# Patient Record
Sex: MALE | Race: WHITE | NOT HISPANIC OR LATINO | Employment: OTHER | URBAN - METROPOLITAN AREA
[De-identification: names, ages, dates, MRNs, and addresses within clinical notes are randomized per-mention and may not be internally consistent; named-entity substitution may affect disease eponyms.]

---

## 2018-07-15 ENCOUNTER — HOSPITAL ENCOUNTER (INPATIENT)
Facility: HOSPITAL | Age: 68
LOS: 15 days | Discharge: NURSING, REHAB OR LONG-TERM CARE | DRG: 064 | End: 2018-07-30
Attending: INTERNAL MEDICINE | Admitting: INTERNAL MEDICINE
Payer: MEDICARE

## 2018-07-15 ENCOUNTER — HOSPITAL ENCOUNTER (EMERGENCY)
Facility: HOSPITAL | Age: 68
Discharge: ANOTHER HOSPITAL | End: 2018-07-15
Attending: EMERGENCY MEDICINE

## 2018-07-15 VITALS
HEART RATE: 98 BPM | SYSTOLIC BLOOD PRESSURE: 109 MMHG | TEMPERATURE: 100 F | RESPIRATION RATE: 18 BRPM | WEIGHT: 200 LBS | DIASTOLIC BLOOD PRESSURE: 81 MMHG | OXYGEN SATURATION: 98 %

## 2018-07-15 DIAGNOSIS — I63.9: ICD-10-CM

## 2018-07-15 DIAGNOSIS — J96.02 ACUTE HYPERCAPNIC RESPIRATORY FAILURE: Primary | ICD-10-CM

## 2018-07-15 DIAGNOSIS — I63.9 RIGHT SIDED CEREBRAL HEMISPHERE CEREBROVASCULAR ACCIDENT (CVA): Primary | ICD-10-CM

## 2018-07-15 DIAGNOSIS — R68.89 ALTERATION IN BLOOD PRESSURE: ICD-10-CM

## 2018-07-15 DIAGNOSIS — I63.413 CEREBRAL INFARCTION DUE TO BILATERAL EMBOLISM OF MIDDLE CEREBRAL ARTERIES: ICD-10-CM

## 2018-07-15 DIAGNOSIS — I63.9 STROKE: ICD-10-CM

## 2018-07-15 DIAGNOSIS — I63.033: ICD-10-CM

## 2018-07-15 PROBLEM — E11.9 TYPE 2 DIABETES MELLITUS: Status: ACTIVE | Noted: 2018-07-15

## 2018-07-15 PROBLEM — D72.829 LEUCOCYTOSIS: Status: ACTIVE | Noted: 2018-07-15

## 2018-07-15 PROBLEM — N17.9 AKI (ACUTE KIDNEY INJURY): Status: ACTIVE | Noted: 2018-07-15

## 2018-07-15 PROBLEM — Z98.890 S/P CAROTID ENDARTERECTOMY: Status: ACTIVE | Noted: 2018-07-15

## 2018-07-15 PROBLEM — E78.5 HLD (HYPERLIPIDEMIA): Status: ACTIVE | Noted: 2018-07-15

## 2018-07-15 PROBLEM — I10 ESSENTIAL HYPERTENSION: Status: ACTIVE | Noted: 2018-07-15

## 2018-07-15 LAB
ALBUMIN SERPL BCP-MCNC: 3.1 G/DL
ALBUMIN SERPL BCP-MCNC: 4.2 G/DL
ALP SERPL-CCNC: 83 U/L
ALP SERPL-CCNC: 88 U/L
ALT SERPL W/O P-5'-P-CCNC: 50 U/L
ALT SERPL W/O P-5'-P-CCNC: 60 U/L
ANION GAP SERPL CALC-SCNC: 12 MMOL/L
ANION GAP SERPL CALC-SCNC: 13 MMOL/L
AST SERPL-CCNC: 127 U/L
AST SERPL-CCNC: 84 U/L
BACTERIA #/AREA URNS HPF: ABNORMAL /HPF
BASOPHILS # BLD AUTO: 0.02 K/UL
BASOPHILS NFR BLD: 0.1 %
BILIRUB SERPL-MCNC: 0.3 MG/DL
BILIRUB SERPL-MCNC: 0.8 MG/DL
BILIRUB UR QL STRIP: NEGATIVE
BUN SERPL-MCNC: 54 MG/DL
BUN SERPL-MCNC: 56 MG/DL
CALCIUM SERPL-MCNC: 9.2 MG/DL
CALCIUM SERPL-MCNC: 9.6 MG/DL
CHLORIDE SERPL-SCNC: 108 MMOL/L
CHLORIDE SERPL-SCNC: 116 MMOL/L
CHOLEST SERPL-MCNC: 141 MG/DL
CHOLEST/HDLC SERPL: 5.9 {RATIO}
CLARITY UR: CLEAR
CO2 SERPL-SCNC: 15 MMOL/L
CO2 SERPL-SCNC: 20 MMOL/L
COLOR UR: YELLOW
CREAT SERPL-MCNC: 2.3 MG/DL
CREAT SERPL-MCNC: 2.9 MG/DL
DIFFERENTIAL METHOD: ABNORMAL
EOSINOPHIL # BLD AUTO: 0 K/UL
EOSINOPHIL NFR BLD: 0 %
ERYTHROCYTE [DISTWIDTH] IN BLOOD BY AUTOMATED COUNT: 14.1 %
EST. GFR  (AFRICAN AMERICAN): 24.4 ML/MIN/1.73 M^2
EST. GFR  (AFRICAN AMERICAN): 32 ML/MIN/1.73 M^2
EST. GFR  (NON AFRICAN AMERICAN): 21.1 ML/MIN/1.73 M^2
EST. GFR  (NON AFRICAN AMERICAN): 28 ML/MIN/1.73 M^2
GLUCOSE SERPL-MCNC: 173 MG/DL
GLUCOSE SERPL-MCNC: 186 MG/DL
GLUCOSE UR QL STRIP: NEGATIVE
HCT VFR BLD AUTO: 49.9 %
HDLC SERPL-MCNC: 24 MG/DL
HDLC SERPL: 17 %
HGB BLD-MCNC: 16.5 G/DL
HGB UR QL STRIP: ABNORMAL
HYALINE CASTS #/AREA URNS LPF: 6 /LPF
IMM GRANULOCYTES # BLD AUTO: 0.07 K/UL
IMM GRANULOCYTES NFR BLD AUTO: 0.5 %
INR PPP: 1.1
KETONES UR QL STRIP: ABNORMAL
LDLC SERPL CALC-MCNC: 63.8 MG/DL
LEUKOCYTE ESTERASE UR QL STRIP: NEGATIVE
LYMPHOCYTES # BLD AUTO: 0.6 K/UL
LYMPHOCYTES NFR BLD: 4 %
MCH RBC QN AUTO: 30.6 PG
MCHC RBC AUTO-ENTMCNC: 33.1 G/DL
MCV RBC AUTO: 92 FL
MICROSCOPIC COMMENT: ABNORMAL
MONOCYTES # BLD AUTO: 1.1 K/UL
MONOCYTES NFR BLD: 7.5 %
NEUTROPHILS # BLD AUTO: 13 K/UL
NEUTROPHILS NFR BLD: 87.9 %
NITRITE UR QL STRIP: NEGATIVE
NONHDLC SERPL-MCNC: 117 MG/DL
NRBC BLD-RTO: 0 /100 WBC
PH UR STRIP: 6 [PH] (ref 5–8)
PLATELET # BLD AUTO: 131 K/UL
PMV BLD AUTO: 13.1 FL
POCT GLUCOSE: 166 MG/DL (ref 70–110)
POCT GLUCOSE: 172 MG/DL (ref 70–110)
POTASSIUM SERPL-SCNC: 4.5 MMOL/L
POTASSIUM SERPL-SCNC: 4.8 MMOL/L
PROT SERPL-MCNC: 6.8 G/DL
PROT SERPL-MCNC: 8.4 G/DL
PROT UR QL STRIP: ABNORMAL
PROTHROMBIN TIME: 12.8 SEC
RBC # BLD AUTO: 5.4 M/UL
RBC #/AREA URNS HPF: 0 /HPF (ref 0–4)
SODIUM SERPL-SCNC: 140 MMOL/L
SODIUM SERPL-SCNC: 144 MMOL/L
SP GR UR STRIP: 1.02 (ref 1–1.03)
SQUAMOUS #/AREA URNS HPF: 0 /HPF
TRIGL SERPL-MCNC: 266 MG/DL
TSH SERPL DL<=0.005 MIU/L-ACNC: 2.15 UIU/ML
URN SPEC COLLECT METH UR: ABNORMAL
UROBILINOGEN UR STRIP-ACNC: NEGATIVE EU/DL
WBC # BLD AUTO: 14.82 K/UL
WBC #/AREA URNS HPF: 1 /HPF (ref 0–5)

## 2018-07-15 PROCEDURE — 70450 CT HEAD/BRAIN W/O DYE: CPT | Mod: TC

## 2018-07-15 PROCEDURE — A4216 STERILE WATER/SALINE, 10 ML: HCPCS | Performed by: INTERNAL MEDICINE

## 2018-07-15 PROCEDURE — 25000003 PHARM REV CODE 250: Performed by: EMERGENCY MEDICINE

## 2018-07-15 PROCEDURE — 85025 COMPLETE CBC W/AUTO DIFF WBC: CPT

## 2018-07-15 PROCEDURE — 27000221 HC OXYGEN, UP TO 24 HOURS

## 2018-07-15 PROCEDURE — 20000000 HC ICU ROOM

## 2018-07-15 PROCEDURE — 80061 LIPID PANEL: CPT

## 2018-07-15 PROCEDURE — 84443 ASSAY THYROID STIM HORMONE: CPT

## 2018-07-15 PROCEDURE — 99223 1ST HOSP IP/OBS HIGH 75: CPT | Mod: ,,, | Performed by: PSYCHIATRY & NEUROLOGY

## 2018-07-15 PROCEDURE — 99285 EMERGENCY DEPT VISIT HI MDM: CPT | Mod: 25

## 2018-07-15 PROCEDURE — 36415 COLL VENOUS BLD VENIPUNCTURE: CPT

## 2018-07-15 PROCEDURE — 80053 COMPREHEN METABOLIC PANEL: CPT | Mod: 91

## 2018-07-15 PROCEDURE — 70450 CT HEAD/BRAIN W/O DYE: CPT | Mod: 26,,, | Performed by: RADIOLOGY

## 2018-07-15 PROCEDURE — 94761 N-INVAS EAR/PLS OXIMETRY MLT: CPT

## 2018-07-15 PROCEDURE — 83036 HEMOGLOBIN GLYCOSYLATED A1C: CPT

## 2018-07-15 PROCEDURE — 25000003 PHARM REV CODE 250: Performed by: INTERNAL MEDICINE

## 2018-07-15 PROCEDURE — 81000 URINALYSIS NONAUTO W/SCOPE: CPT

## 2018-07-15 PROCEDURE — 85610 PROTHROMBIN TIME: CPT

## 2018-07-15 PROCEDURE — G0425 INPT/ED TELECONSULT30: HCPCS | Mod: GT,,, | Performed by: PSYCHIATRY & NEUROLOGY

## 2018-07-15 PROCEDURE — 80053 COMPREHEN METABOLIC PANEL: CPT

## 2018-07-15 RX ORDER — ASPIRIN 81 MG/1
81 TABLET ORAL DAILY
Status: DISCONTINUED | OUTPATIENT
Start: 2018-07-15 | End: 2018-07-15 | Stop reason: SDUPTHER

## 2018-07-15 RX ORDER — ASPIRIN 300 MG/1
300 SUPPOSITORY RECTAL DAILY
Status: DISCONTINUED | OUTPATIENT
Start: 2018-07-16 | End: 2018-07-26

## 2018-07-15 RX ORDER — METOPROLOL SUCCINATE 25 MG/1
25 TABLET, EXTENDED RELEASE ORAL DAILY
Status: DISCONTINUED | OUTPATIENT
Start: 2018-07-15 | End: 2018-07-16

## 2018-07-15 RX ORDER — TRAZODONE HYDROCHLORIDE 50 MG/1
150 TABLET ORAL NIGHTLY
Status: DISCONTINUED | OUTPATIENT
Start: 2018-07-15 | End: 2018-07-17

## 2018-07-15 RX ORDER — ATORVASTATIN CALCIUM 40 MG/1
40 TABLET, FILM COATED ORAL NIGHTLY
Status: DISCONTINUED | OUTPATIENT
Start: 2018-07-15 | End: 2018-07-17

## 2018-07-15 RX ORDER — BUSPIRONE HYDROCHLORIDE 5 MG/1
5 TABLET ORAL DAILY
COMMUNITY

## 2018-07-15 RX ORDER — ATORVASTATIN CALCIUM 10 MG/1
10 TABLET, FILM COATED ORAL NIGHTLY
Status: DISCONTINUED | OUTPATIENT
Start: 2018-07-15 | End: 2018-07-15 | Stop reason: SDUPTHER

## 2018-07-15 RX ORDER — METOPROLOL TARTRATE 1 MG/ML
5 INJECTION, SOLUTION INTRAVENOUS EVERY 5 MIN PRN
Status: DISCONTINUED | OUTPATIENT
Start: 2018-07-15 | End: 2018-07-30 | Stop reason: HOSPADM

## 2018-07-15 RX ORDER — POLYETHYLENE GLYCOL 3350 17 G/17G
17 POWDER, FOR SOLUTION ORAL DAILY
Status: DISCONTINUED | OUTPATIENT
Start: 2018-07-15 | End: 2018-07-25

## 2018-07-15 RX ORDER — HYDROXYZINE PAMOATE 50 MG/1
50 CAPSULE ORAL DAILY PRN
Status: ON HOLD | COMMUNITY
End: 2018-07-30 | Stop reason: HOSPADM

## 2018-07-15 RX ORDER — CLOPIDOGREL BISULFATE 75 MG/1
75 TABLET ORAL DAILY
Status: DISCONTINUED | OUTPATIENT
Start: 2018-07-15 | End: 2018-07-17

## 2018-07-15 RX ORDER — ACETAMINOPHEN 500 MG
TABLET ORAL NIGHTLY PRN
COMMUNITY

## 2018-07-15 RX ORDER — LISINOPRIL 10 MG/1
10 TABLET ORAL DAILY
Status: DISCONTINUED | OUTPATIENT
Start: 2018-07-15 | End: 2018-07-15

## 2018-07-15 RX ORDER — METOPROLOL SUCCINATE 50 MG/1
25 TABLET, EXTENDED RELEASE ORAL DAILY
Status: ON HOLD | COMMUNITY
End: 2018-07-30 | Stop reason: HOSPADM

## 2018-07-15 RX ORDER — ATORVASTATIN CALCIUM 20 MG/1
10 TABLET, FILM COATED ORAL DAILY
COMMUNITY

## 2018-07-15 RX ORDER — ASPIRIN 81 MG/1
81 TABLET ORAL DAILY
COMMUNITY

## 2018-07-15 RX ORDER — SODIUM CHLORIDE 0.9 % (FLUSH) 0.9 %
3 SYRINGE (ML) INJECTION EVERY 8 HOURS
Status: DISCONTINUED | OUTPATIENT
Start: 2018-07-15 | End: 2018-07-30 | Stop reason: HOSPADM

## 2018-07-15 RX ORDER — BUSPIRONE HYDROCHLORIDE 5 MG/1
5 TABLET ORAL DAILY
Status: DISCONTINUED | OUTPATIENT
Start: 2018-07-15 | End: 2018-07-17

## 2018-07-15 RX ORDER — SODIUM CHLORIDE 9 MG/ML
INJECTION, SOLUTION INTRAVENOUS CONTINUOUS
Status: DISCONTINUED | OUTPATIENT
Start: 2018-07-15 | End: 2018-07-18

## 2018-07-15 RX ORDER — POLYETHYLENE GLYCOL 3350 17 G/17G
17 POWDER, FOR SOLUTION ORAL DAILY
Status: DISCONTINUED | OUTPATIENT
Start: 2018-07-15 | End: 2018-07-15 | Stop reason: SDUPTHER

## 2018-07-15 RX ORDER — ASPIRIN 81 MG/1
81 TABLET ORAL DAILY
Status: DISCONTINUED | OUTPATIENT
Start: 2018-07-15 | End: 2018-07-15

## 2018-07-15 RX ORDER — SODIUM CHLORIDE 9 MG/ML
1000 INJECTION, SOLUTION INTRAVENOUS
Status: COMPLETED | OUTPATIENT
Start: 2018-07-15 | End: 2018-07-15

## 2018-07-15 RX ORDER — GLIPIZIDE 5 MG/1
5 TABLET, FILM COATED, EXTENDED RELEASE ORAL
Status: ON HOLD | COMMUNITY
End: 2018-07-30 | Stop reason: HOSPADM

## 2018-07-15 RX ORDER — PAROXETINE HYDROCHLORIDE 20 MG/1
40 TABLET, FILM COATED ORAL EVERY MORNING
Status: DISCONTINUED | OUTPATIENT
Start: 2018-07-15 | End: 2018-07-17

## 2018-07-15 RX ORDER — LISINOPRIL 10 MG/1
10 TABLET ORAL DAILY
COMMUNITY

## 2018-07-15 RX ORDER — PAROXETINE HYDROCHLORIDE 40 MG/1
40 TABLET, FILM COATED ORAL EVERY MORNING
COMMUNITY

## 2018-07-15 RX ORDER — TRAZODONE HYDROCHLORIDE 50 MG/1
150 TABLET ORAL NIGHTLY
COMMUNITY

## 2018-07-15 RX ADMIN — SODIUM CHLORIDE, PRESERVATIVE FREE 3 ML: 5 INJECTION INTRAVENOUS at 02:07

## 2018-07-15 RX ADMIN — SODIUM CHLORIDE, PRESERVATIVE FREE 3 ML: 5 INJECTION INTRAVENOUS at 11:07

## 2018-07-15 RX ADMIN — DEXMEDETOMIDINE HYDROCHLORIDE 0.1 MCG/KG/HR: 100 INJECTION, SOLUTION, CONCENTRATE INTRAVENOUS at 04:07

## 2018-07-15 RX ADMIN — SODIUM CHLORIDE: 0.9 INJECTION, SOLUTION INTRAVENOUS at 10:07

## 2018-07-15 RX ADMIN — SODIUM CHLORIDE: 0.9 INJECTION, SOLUTION INTRAVENOUS at 02:07

## 2018-07-15 RX ADMIN — SODIUM CHLORIDE 1000 ML: 9 INJECTION, SOLUTION INTRAVENOUS at 08:07

## 2018-07-15 NOTE — H&P
[facility] Medicine  History & Physical    Patient Name: Kel Lock  MRN: [id number]  Admission Date: 7/15/2018  Attending Physician: Naseem Rutledge MD  Primary Care Provider: [facility]         Patient information was obtained from patient and ER records.     Subjective:     Principal Problem:Thrombotic stroke    Chief Complaint: No chief complaint on file.       HPI: Mr. Lock is a 67 YOCM with PMH of HTN, DM, HLD, who was last seen by his ex-wife 3 days ago, normal , and apparently was found lying on floor unable to move himself back to the bed after he had a fall, heard by his roommate. The above statement was obtained from the medical record. Family at bed sided include his daughter, son in-law, ex wife, who have not witness any change in his clinical status.   He was presented to the [facility] ER with right sided weakness, slurred speech and altered mental status. Telestroke was consulted who calculated his NIH value at 16. Initial CT head showed areas of moderate involutional changes consistent with microvascular ischemic changes with encephalomalacia watershed area of distribution, and subacute infarcts at right and left parieto-occipital cortex.   Pt was communicated with neurology, apparently who recommended MRI of the brain without contrast.   At the time of my interview with the patient, he denied CP, SOB or HA, problem with vision, but he clearly has left preference and right hemineglect. The pt is asking for water since he has touched the floor.       Past Medical History:   Diagnosis Date    COPD (chronic obstructive pulmonary disease)     Depression     Diabetes mellitus     Hypertension     Stroke        Past Surgical History:   Procedure Laterality Date    carpal tunel surgery Bilateral     coronary artery endartarectomy      FRACTURE SURGERY      SHOULDER SURGERY Right        Review of patient's allergies indicates:  No Known  Allergies    Current Facility-Administered Medications on File Prior to Encounter   Medication    [COMPLETED] 0.9%  NaCl infusion     Current Outpatient Prescriptions on File Prior to Encounter   Medication Sig    aspirin (ECOTRIN) 81 MG EC tablet Take 81 mg by mouth once daily.    atorvastatin (LIPITOR) 20 MG tablet Take 10 mg by mouth once daily.     busPIRone (BUSPAR) 5 MG Tab Take 5 mg by mouth once daily.     diphenhydramine HCl (UNISOM, DIPHENHYDRAMINE, ORAL) Take 1 tablet by mouth nightly as needed.     glipiZIDE (GLUCOTROL) 5 MG TR24 Take 5 mg by mouth 3 (three) times daily with meals.     hydrOXYzine pamoate (VISTARIL) 50 MG Cap Take 50 mg by mouth daily as needed.     lisinopril 10 MG tablet Take 10 mg by mouth once daily.    melatonin 5 mg Tab Take by mouth nightly as needed.     metoprolol succinate (TOPROL-XL) 50 MG 24 hr tablet Take 25 mg by mouth once daily.     paroxetine (PAXIL) 40 MG tablet Take 40 mg by mouth every morning.    traZODone (DESYREL) 50 MG tablet Take 150 mg by mouth every evening.      Family History     Problem Relation (Age of Onset)    Early death Father    Heart disease Mother, Father    Stroke Brother        Social History Main Topics    Smoking status: Current Every Day Smoker     Packs/day: 2.00    Smokeless tobacco: Never Used    Alcohol use No    Drug use: No      Comment: Unable to assess    Sexual activity: Not on file     Review of Systems   Unable to perform ROS: Mental status change   Eyes: Negative.    Respiratory: Negative.    Endocrine: Negative.      Objective:     Vital Signs (Most Recent):  Temp: 96.2 °F (35.7 °C) (07/15/18 1110)  Pulse: 101 (07/15/18 1110)  Resp: 20 (07/15/18 1110)  BP: 136/72 (07/15/18 1110)  SpO2: (!) 91 % (07/15/18 0953) Vital Signs (24h Range):  Temp:  [96.2 °F (35.7 °C)-99.8 °F (37.7 °C)] 96.2 °F (35.7 °C)  Pulse:  [] 101  Resp:  [18-21] 20  SpO2:  [91 %-98 %] 91 %  BP: (101-138)/() 136/72     Weight: 83.6 kg  (184 lb 4.9 oz)  Body mass index is 28.02 kg/m².    Physical Exam   Constitutional: He appears well-developed and well-nourished.   HENT:   Head: Normocephalic and atraumatic.   Left Ear: External ear normal.   Nose: Nose normal.   Eyes: Conjunctivae and EOM are normal. Pupils are equal, round, and reactive to light. Right eye exhibits no discharge. Left eye exhibits no discharge. No scleral icterus.   Neck: Normal range of motion. Neck supple. No JVD present. No tracheal deviation present. No thyromegaly present.   Pulmonary/Chest: Effort normal and breath sounds normal. No stridor. No respiratory distress. He has no wheezes. He has no rales. He exhibits no tenderness.   Abdominal: Soft. Bowel sounds are normal. He exhibits distension. He exhibits no mass. There is no tenderness. There is no rebound and no guarding. No hernia.   Musculoskeletal:   ROM in all extremities cannot be achieved due to dense spastic paralysis of right lower leg and paresis of the right upper extremity    Lymphadenopathy:     He has no cervical adenopathy.   Neurological: He is alert.   Disoriented x 2   Skin: Skin is warm and dry. Capillary refill takes less than 2 seconds. No rash noted. No erythema. No pallor.   Nursing note and vitals reviewed.        CRANIAL NERVES     CN III, IV, VI   Pupils are equal, round, and reactive to light.  Extraocular motions are normal.        Significant Labs: All pertinent labs within the past 24 hours have been reviewed.    Significant Imaging: I have reviewed all pertinent imaging results/findings within the past 24 hours.    Assessment/Plan:     * Thrombotic stroke    - Multiple risk factors for stroke, seems to have poor control on health and risk factors  - Current signs and symptoms appear to be secondary to subacute stroke - evidenced from the CT scan.   -MRI without contrast and carotid doppler bilateral.   -Start ASA, Plavix, for secondary prevention.  -DVT ppx with heparin for GRACIE   -Statin for  being ischemic           GRACIE (acute kidney injury)      -Unknown etiology;;Unknown if chronic.   -Avoid nephrotoxic medication.  -Continue IV hydration,   -        Leucocytosis    -Suspect the stress response to stroke.   -Afebrile, will observe for now.   - Follow the trend,.           HLD (hyperlipidemia)      -Continue statin.           Essential hypertension      -Currently stable and observing permissive HTN, until Neurology sees the pt.   -Will treat with PRN antihypertensive medications.   -        Type 2 diabetes mellitus    -holding off all oral hypoglycemics  -Start sliding scale insulin.           VTE Risk Mitigation         Ordered     Place MONAE hose  Until discontinued      07/15/18 1152     IP VTE HIGH RISK PATIENT  Once      07/15/18 1152             Naseem Rutledge MD  Department of Hospital Medicine   [facility]

## 2018-07-15 NOTE — ASSESSMENT & PLAN NOTE
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 Neoplasm not excluded      Antithrombotics for secondary stroke prevention: Antiplatelets: Aspirin: 81 mg daily    Statins for secondary stroke prevention and hyperlipidemia, if present:   Statins: Atorvastatin- 80 mg daily    Aggressive risk factor modification: HTN, Smoking, DM, HLD, Diet, Exercise, Obesity     Rehab efforts: Occupational Therapy, PT/OT/SLP to evaluate and treat, PM&R consult     Diagnostics ordered/pending: Carotid ultrasound to assess vasculature, HgbA1C to assess blood glucose levels, Lipid Profile to assess cholesterol levels, MRA head to assess vasculature, MRA neck/arch to assess vasculature, MRI head without contrast to assess brain parenchyma, TTE to assess cardiac function/status , Other: le doppler, vidhya, blood cultures, mri brain w and w/o contrast, loop recorder    VTE prophylaxis: Enoxaparin 40 mg SQ every 24 hours    BP parameters: Infarct: No intervention, SBP <220

## 2018-07-15 NOTE — HPI
67 year old presents with L MCA syndrome    PMHX: limited- dm2, htn. Hld, smoker    lsn 1800 yesterday  Came to ED with r sided weakness   Ct head abnormal- b/l cerebral edema most likely stroke, but neoplasm not excluded

## 2018-07-15 NOTE — CONSULTS
[facility]  Vascular Neurology  Comprehensive Stroke Center  Tele-Consultation Note      Consults    Consulting Provider: Spoke Physician:: Dr. Pancho Gatica  Current Providers  No providers found    Patient Location: [facility]  Spoke hospital nurse at bedside with patient assisting consultant.     Patient information was obtained from patient.       Assessment/Plan:     STROKE DOCUMENTATION     Acute Stroke Times:   Acute Stroke Times   Last Known Normal Date: 07/14/18  Last Known Normal Time: 1800  Stroke Team Arrival Date: 07/15/18  Stroke Team Arrival Time: 0740  CT Interpretation Time: 0742    NIH Scale:  1a. Level Of Consciousness: 1-->Not alert: but arousable by minor stimulation to obey, answer, or respond  1b. LOC Questions: 0-->Answers both questions correctly  1c. LOC Commands: 0-->Performs both tasks correctly  2. Best Gaze: 2-->Forced deviation, or total gaze paresis not overcome by the oculocephalic maneuver  3. Visual: 0-->No visual loss  4. Facial Palsy: 2-->Partial paralysis (total or near-total paralysis of lower face)  5a. Motor Arm, Left: 0-->No drift: limb holds 90 (or 45) degrees for full 10 secs  5b. Motor Arm, Right: 4-->No movement  6a. Motor Leg, Left: 0-->No drift: leg holds 30 degree position for full 5 secs  6b. Motor Leg, Right: 4-->No movement  7. Limb Ataxia: 0-->Absent  8. Sensory: 0-->Normal: no sensory loss  9. Best Language: 1-->Mild-to-moderate aphasia: some obvious loss of fluency or facility of comprehension, without significant limitation on ideas expressed or form of expression. Reduction of speech and/or comprehension, however, makes conversation. . . (see row details)  10. Dysarthria: 1-->Mild-to-moderate dysarthria: patient slurs at least some words and, at worst, can be understood with some difficulty  11. 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 Neoplasm not excluded      Antithrombotics for secondary stroke prevention: Antiplatelets: Aspirin: 81 mg daily    Statins for secondary stroke prevention and hyperlipidemia, if present:   Statins: Atorvastatin- 80 mg daily    Aggressive risk factor modification: HTN, Smoking, DM, HLD, Diet, Exercise, Obesity     Rehab efforts: Occupational Therapy, PT/OT/SLP to evaluate and treat, PM&R consult     Diagnostics ordered/pending: Carotid ultrasound to assess vasculature, HgbA1C to assess blood glucose levels, Lipid Profile to assess cholesterol levels, MRA head to assess vasculature, MRA neck/arch to assess vasculature, MRI head without contrast to assess brain parenchyma, TTE to assess cardiac function/status , Other: le doppler, vidhya, blood cultures, mri brain w and w/o contrast, loop recorder    VTE prophylaxis: Enoxaparin 40 mg SQ every 24 hours    BP parameters: Infarct: No intervention, SBP <220                Blood pressure 109/60, pulse 104, temperature 99.8 °F (37.7 °C), temperature source Rectal, resp. rate 19, weight 90.7 kg (200 lb), SpO2 95 %.  Alteplase Eligible?: No  Alteplase Recommendation: Alteplase not recommended due to Outside of treatment window  and Hypodensity on CT   Possible Interventional Revascularization Candidate? No; No ischemic penumbra    Disposition Recommendation: transfer to system [facility] by  ground  stat      Subjective:     History of Present Illness:  67 year old presents with L MCA  syndrome    PMHX: limited- dm2, htn. Hld, smoker    lsn 1800 yesterday  Came to ED with r sided weakness   Ct head abnormal- b/l cerebral edema most likely stroke, but neoplasm not excluded      Woke up with symptoms?: yes  Last known normal: Last Known Normal Date: 07/14/18 Last Known Normal Time: 1800    Recent bleeding noted: no  Does the patient take any Blood Thinners? no  Medications: Antiplatelets:  aspirin      Past Medical History: hypertension, diabetes and hyperlipidemia    Past Surgical History: no major surgeries within the last 2 weeks    Family History: no relevant history    Social History: smoker (active)    Allergies:   No relevant allergies    Review of Systems   Musculoskeletal: Positive for gait problem.   Neurological: Positive for facial asymmetry, speech difficulty, weakness and numbness.   All other systems reviewed and are negative.    Objective:   Vitals: Blood pressure 109/60, pulse 104, temperature 99.8 °F (37.7 °C), temperature source Rectal, resp. rate 19, weight 90.7 kg (200 lb), SpO2 95 %.     CT READ: Yes  Abnormal CT manuel regions of edema c/w completed stroke. neoplasm not excluded.        Physical Exam   Constitutional: He appears well-developed.   HENT:   Head: Normocephalic.   Eyes: Pupils are equal, round, and reactive to light.   Neck: Normal range of motion.   Cardiovascular: Normal rate.    Pulmonary/Chest: Effort normal.   Abdominal: Soft.   Neurological: A cranial nerve deficit and sensory deficit is present. He exhibits abnormal muscle tone. Coordination abnormal.             Recommended the emergency room physician to have a brief discussion with the patient and/or family if available regarding the risks and benefits of treatment, and to briefly document the occurrence of that discussion in his clinical encounter note.     The attending portion of this evaluation, treatment, and documentation was performed per Gorge Telles MD via audiovisual.    Billing code:   (moderate to severe stroke, large areas of edema, some mimics)    · This patient has a critical neurological condition/illness, with high morbidity and mortality.  · There is a high probability for acute neurological change leading to clinical and possibly life-threatening deterioration requiring highest level of physician preparedness for urgent intervention.  · Care was coordinated with other physicians involved in the patient's care.  · Radiologic studies and laboratory data were reviewed and interpreted, and plan of care was re-assessed based on the results.  · Diagnosis, treatment options and prognosis may have been discussed with the patient and/or family members or caregiver.  · Further advanced medical management and further evaluation is warranted for his care.      Consult End Time: 8:00 AM     Gorge Telles MD  Comprehensive Stroke Center  Vascular Neurology   [facility]

## 2018-07-15 NOTE — ED PROVIDER NOTES
Encounter Date: 7/15/2018       History     Chief Complaint   Patient presents with    Cerebrovascular Accident     This is a 67-year-old white male here with complaint of right-sided weakness and being obtunded.  Last seen at 7 o'clock the night before this morning woke up with a right-sided weakness and his family called EMS to be brought to the hospital.  Patient is aphasic when asked to move his right side removal knee is left did not reply well her follow orders very well he has at prominent right-sided weakness of his right arm and his right leg.  He does not vocalize          Review of patient's allergies indicates:  No Known Allergies  No past medical history on file.  No past surgical history on file.  No family history on file.  Social History   Substance Use Topics    Smoking status: Not on file    Smokeless tobacco: Not on file    Alcohol use Not on file     Review of Systems   Neurological: Positive for speech difficulty and weakness.   Psychiatric/Behavioral: Positive for confusion and decreased concentration.   All other systems reviewed and are negative.      Physical Exam     Initial Vitals   BP Pulse Resp Temp SpO2   07/15/18 0716 07/15/18 0716 07/15/18 0741 07/15/18 0716 07/15/18 0716   (!) 138/119 103 19 99.8 °F (37.7 °C) 97 %      MAP       --                Physical Exam    Nursing note and vitals reviewed.  Constitutional: He appears well-developed and well-nourished. He appears lethargic.   HENT:   Head: Normocephalic.   Right Ear: External ear normal.   Left Ear: External ear normal.   Nose: Nose normal.   Mouth/Throat: Oropharynx is clear and moist.   Eyes: Conjunctivae and EOM are normal. Pupils are equal, round, and reactive to light.   Neck: Normal range of motion. Neck supple.   Cardiovascular: Normal rate, regular rhythm, normal heart sounds and intact distal pulses.   No murmur heard.  Pulmonary/Chest: Breath sounds normal. He has no wheezes. He has no rhonchi. He has no rales.    Abdominal: Soft. Bowel sounds are normal. He exhibits no mass. There is no tenderness.   Musculoskeletal: Normal range of motion.   Neurological: He has normal strength. He appears lethargic. He displays normal reflexes. A cranial nerve deficit is present. No sensory deficit. He exhibits abnormal muscle tone. GCS eye subscore is 4. GCS verbal subscore is 2. GCS motor subscore is 4. He displays Babinski's sign on the right side.   Reflex Scores:       Bicep reflexes are 0 on the right side.       Patellar reflexes are 0 on the right side.  There is a total right-sided paralysis with some facial asymmetry on the right side.  Patient is nonvocal has a Blair coma scale of 10.  Vital signs appear to be within normal limits other than a temperature of 99.8°.   Skin: Skin is warm and dry. Capillary refill takes less than 2 seconds.   Psychiatric: He has a normal mood and affect. His behavior is normal. Judgment and thought content normal.         ED Course   Procedures  Labs Reviewed   CBC W/ AUTO DIFFERENTIAL   COMPREHENSIVE METABOLIC PANEL   PROTIME-INR          Imaging Results          CT Head Without Contrast (In process)                  Medical Decision Making:   ED Management:  Patient was evaluated by Dr. majano on tele Neurology.  He feels this is a complete stroke given the time frame no tPA is indicated this point he will be transferred to a neurology floor.  Patient remained stable in the ER so we will not intubate him at this point                      Clinical Impression:   The encounter diagnosis was Right sided cerebral hemisphere cerebrovascular accident (CVA).                             Pancho Gatica MD  07/23/18 5322

## 2018-07-15 NOTE — SUBJECTIVE & OBJECTIVE
Past Medical History:   Diagnosis Date    COPD (chronic obstructive pulmonary disease)     Depression     Diabetes mellitus     Hypertension     Stroke        Past Surgical History:   Procedure Laterality Date    carpal tunel surgery Bilateral     coronary artery endartarectomy      FRACTURE SURGERY      SHOULDER SURGERY Right        Review of patient's allergies indicates:  No Known Allergies    Current Facility-Administered Medications on File Prior to Encounter   Medication    [COMPLETED] 0.9%  NaCl infusion     Current Outpatient Prescriptions on File Prior to Encounter   Medication Sig    aspirin (ECOTRIN) 81 MG EC tablet Take 81 mg by mouth once daily.    atorvastatin (LIPITOR) 20 MG tablet Take 10 mg by mouth once daily.     busPIRone (BUSPAR) 5 MG Tab Take 5 mg by mouth once daily.     diphenhydramine HCl (UNISOM, DIPHENHYDRAMINE, ORAL) Take 1 tablet by mouth nightly as needed.     glipiZIDE (GLUCOTROL) 5 MG TR24 Take 5 mg by mouth 3 (three) times daily with meals.     hydrOXYzine pamoate (VISTARIL) 50 MG Cap Take 50 mg by mouth daily as needed.     lisinopril 10 MG tablet Take 10 mg by mouth once daily.    melatonin 5 mg Tab Take by mouth nightly as needed.     metoprolol succinate (TOPROL-XL) 50 MG 24 hr tablet Take 25 mg by mouth once daily.     paroxetine (PAXIL) 40 MG tablet Take 40 mg by mouth every morning.    traZODone (DESYREL) 50 MG tablet Take 150 mg by mouth every evening.      Family History     Problem Relation (Age of Onset)    Early death Father    Heart disease Mother, Father    Stroke Brother        Social History Main Topics    Smoking status: Current Every Day Smoker     Packs/day: 2.00    Smokeless tobacco: Never Used    Alcohol use No    Drug use: No      Comment: Unable to assess    Sexual activity: Not on file     Review of Systems   Unable to perform ROS: Mental status change   Eyes: Negative.    Respiratory: Negative.    Endocrine: Negative.      Objective:      Vital Signs (Most Recent):  Temp: 96.2 °F (35.7 °C) (07/15/18 1110)  Pulse: 101 (07/15/18 1110)  Resp: 20 (07/15/18 1110)  BP: 136/72 (07/15/18 1110)  SpO2: (!) 91 % (07/15/18 0953) Vital Signs (24h Range):  Temp:  [96.2 °F (35.7 °C)-99.8 °F (37.7 °C)] 96.2 °F (35.7 °C)  Pulse:  [] 101  Resp:  [18-21] 20  SpO2:  [91 %-98 %] 91 %  BP: (101-138)/() 136/72     Weight: 83.6 kg (184 lb 4.9 oz)  Body mass index is 28.02 kg/m².    Physical Exam   Constitutional: He appears well-developed and well-nourished.   HENT:   Head: Normocephalic and atraumatic.   Left Ear: External ear normal.   Nose: Nose normal.   Eyes: Conjunctivae and EOM are normal. Pupils are equal, round, and reactive to light. Right eye exhibits no discharge. Left eye exhibits no discharge. No scleral icterus.   Neck: Normal range of motion. Neck supple. No JVD present. No tracheal deviation present. No thyromegaly present.   Pulmonary/Chest: Effort normal and breath sounds normal. No stridor. No respiratory distress. He has no wheezes. He has no rales. He exhibits no tenderness.   Abdominal: Soft. Bowel sounds are normal. He exhibits distension. He exhibits no mass. There is no tenderness. There is no rebound and no guarding. No hernia.   Musculoskeletal:   ROM in all extremities cannot be achieved due to dense spastic paralysis of right lower leg and paresis of the right upper extremity    Lymphadenopathy:     He has no cervical adenopathy.   Neurological: He is alert.   Disoriented x 2   Skin: Skin is warm and dry. Capillary refill takes less than 2 seconds. No rash noted. No erythema. No pallor.   Nursing note and vitals reviewed.        CRANIAL NERVES     CN III, IV, VI   Pupils are equal, round, and reactive to light.  Extraocular motions are normal.        Significant Labs: All pertinent labs within the past 24 hours have been reviewed.    Significant Imaging: I have reviewed all pertinent imaging results/findings within the past 24  hours.

## 2018-07-15 NOTE — NURSING TRANSFER
Nursing Transfer Note      7/15/2018     Transfer pt from PCU to     Transfer via bed        Transported by RNs x3      Chart send with patient: YES    Patient reassessed at:7/15/2018 at 1550  Upon arrival to floor: icu

## 2018-07-15 NOTE — ASSESSMENT & PLAN NOTE
-Unknown etiology;;Unknown if chronic.   -Avoid nephrotoxic medication.  -Continue IV hydration,   -

## 2018-07-15 NOTE — PLAN OF CARE
07/15/18 1205   Patient Assessment/Suction   Level of Consciousness (AVPU) responds to voice   All Lung Fields Breath Sounds diminished   PRE-TX-O2-ETCO2   O2 Device (Oxygen Therapy) nasal cannula   $ Is the patient on Low Flow Oxygen? Yes   Flow (L/min) 2   Oxygen Concentration (%) 28   SpO2 (!) 94 %   Pulse Oximetry Type Intermittent   $ Pulse Oximetry - Multiple Charge Pulse Oximetry - Multiple   Pulse 101   Resp 20   Ready to Wean/Extubation Screen   FIO2<=50 (chart decimal) 0.28

## 2018-07-15 NOTE — HPI
Mr. Lock is a 67 YOCM with PMH of HTN, DM, HLD, who was last seen by his ex-wife 3 days ago, normal , and apparently was found lying on floor unable to move himself back to the bed after he had a fall, heard by his roommate. The above statement was obtained from the medical record. Family at bed sided include his daughter, son in-law, ex wife, who have not witness any change in his clinical status.   He was presented to the [facility] ER with right sided weakness, slurred speech and altered mental status. Telestroke was consulted who calculated his NIH value at 16. Initial CT head showed areas of moderate involutional changes consistent with microvascular ischemic changes with encephalomalacia watershed area of distribution, and subacute infarcts at right and left parieto-occipital cortex.   Pt was communicated with neurology, apparently who recommended MRI of the brain without contrast.   At the time of my interview with the patient, he denied CP, SOB or HA, problem with vision, but he clearly has left preference and right hemineglect. The pt is asking for water since he has touched the floor.

## 2018-07-15 NOTE — NURSING
Charge nurse made house supervisor aware of transfer orders for ICU.  Pt to transfer to room 511 in ICU. Will transport pt by bed.

## 2018-07-15 NOTE — ASSESSMENT & PLAN NOTE
-Currently stable and observing permissive HTN, until Neurology sees the pt.   -Will treat with PRN antihypertensive medications.   -

## 2018-07-15 NOTE — HPI
Mr. Lock is a 67 year old man with HTN, DM, HLD, bilateral CEAs (the family thinks) who was found by this brother lying on the ground with right sided weakness. I have reviewed his record, spoke with Dr. Rutledge, and family and summarized collateral information below.     He presented to the ER at [facility]. Telestroke evaluated the patient, found him to have an NIHSS of 16 with neglect of the right side, dense right hemisplegia, facial droop, aphasia, and dysarthria. CT head showed changes consistent with multiple areas of infarct. Telestroke recommended MRI brain. No CTA was performed.     Per his family, he has never had a stroke before. He is supposedly on daily aspirin, but the patient is unable to say if he is compliant. He has had CEA in the past and family believes this to do bilateral. They are unsure. He is on statin. He takes BP medication, diabetes medication, and antidepressant.

## 2018-07-15 NOTE — NURSING
Pt arrived to floor via stretcher by [facility] from [facility]. VSS. O2 at 2L NC. NS infusing at 75mL/hr from [facility]. Attending MD made aware of pts arrival to floor. Pt does exhibit spastic motions that are fixed in nature to his right side. MD stated that that's common with stroke victims and that is to be expected. Pt is aphasic, exhibits slurred speech and Rt sided paralysis. Orders to follow.

## 2018-07-15 NOTE — ASSESSMENT & PLAN NOTE
-Suspect the stress response to stroke.   -Afebrile, will observe for now.   - Follow the trend,.

## 2018-07-15 NOTE — ASSESSMENT & PLAN NOTE
- Multiple risk factors for stroke, seems to have poor control on health and risk factors  - Current signs and symptoms appear to be secondary to subacute stroke - evidenced from the CT scan.   -MRI without contrast and carotid doppler bilateral.   -Start ASA, Plavix, for secondary prevention.  -DVT ppx with heparin for GRACIE   -Statin for being ischemic

## 2018-07-15 NOTE — ASSESSMENT & PLAN NOTE
The distribution of the strokes is unusual. I have personally reviewed the MRI brain. Much of the stroke area is watershed between MCA/KAIT/PCA, however, there is also significant ischemic stroke of the left MCA territory. The patient is at high risk for hemorrhagic transformation, swelling, and complications. He is not alert enough nor safe to swallow. I have discussed with Dr. Rutledge and agree the patient needs to be monitored in the ICU setting closely for neurologic changes. I have a low threshold to obtain STAT CT head for any neurologic changes, headache, nausea/vomiting, unresponsiveness. We will obtain workup looking for etiology.     - Frequent neuro checks [id number]  - Cardiac monitoring  - Permissive HTN up to 220/110, holding home antihypertensives except half beta-blockers  - Glucose control  - Fasting lipid panel and HgA1c  - Carotid US, holding on obtaining CTA or MRA due to renal dysfunction but we may need this for clarification  - Echocardiogram to look for clot, PFO, and atrial enlargement  - PMR consult/PT/OT/Speech to evaluate and treat  - Social work consult for discharge planning  - Aspirin 81 mg daily and agree with adding plavix 75 mg daily, watching closely for hemorrhagic transformation. NO ANTICOAGULATION AT THIS TIME. If thrombus is seeing on echocardiogram, we need to discuss as he is high risk for hemorrhagic transformation.   - Statin for LDL goal <70

## 2018-07-15 NOTE — PROGRESS NOTES
Dr Laureano' consult note noticed with recommendation for permissive hypertension range -Dr Laureano called re: bp readings noted with precedex administration. Instructed to stop precedex that was being used to help with pt's agitation, even though this nurse has been unsuccessful trying to place nasogastric tube, and carotid ultrasound in progress.  Also instructed to ask Dr Rutledge to evaluate scheduled medications to change to IV or rectal route for now; those that cannot be changed to be placed on hold. Dr Laureano also stated she doesn't like norepinephrine to be used due to the constrictive properties of the drug with pt having a stroke. Dr Rutledge updated on Dr Laureano' recommendations.

## 2018-07-15 NOTE — CONSULTS
[facility]  Neurology  Consult Note    Patient Name: Kel Lock  MRN: [id number]  Admission Date: 7/15/2018  Hospital Length of Stay: 0 days  Code Status: Full Code   Attending Provider: Naseem Rutledge MD   Consulting Provider: Soco Laureano MD  Primary Care Physician: [facility]  Principal Problem:Cerebrovascular accident (CVA) due to bilateral thrombosis of carotid arteries    Consults   Subjective:     Chief Complaint:  stroke     HPI:   Mr. Lock is a 67 year old man with HTN, DM, HLD, bilateral CEAs (the family thinks) who was found by this brother lying on the ground with right sided weakness. I have reviewed his record, spoke with Dr. Rutledge, and family and summarized collateral information below.     He presented to the ER at [facility]. Telestroke evaluated the patient, found him to have an NIHSS of 16 with neglect of the right side, dense right hemisplegia, facial droop, aphasia, and dysarthria. CT head showed changes consistent with multiple areas of infarct. Telestroke recommended MRI brain. No CTA was performed.     Per his family, he has never had a stroke before. He is supposedly on daily aspirin, but the patient is unable to say if he is compliant. He has had CEA in the past and family believes this to do bilateral. They are unsure. He is on statin. He takes BP medication, diabetes medication, and antidepressant.      Past Medical History:   Diagnosis Date    COPD (chronic obstructive pulmonary disease)     Depression     Diabetes mellitus     Hypertension     Stroke        Past Surgical History:   Procedure Laterality Date    carpal tunel surgery Bilateral     coronary artery endartarectomy      FRACTURE SURGERY      SHOULDER SURGERY Right        Review of patient's allergies indicates:  No Known Allergies    Current Facility-Administered Medications on File Prior to Encounter   Medication    [COMPLETED] 0.9%  NaCl infusion     Current  Outpatient Prescriptions on File Prior to Encounter   Medication Sig    aspirin (ECOTRIN) 81 MG EC tablet Take 81 mg by mouth once daily.    atorvastatin (LIPITOR) 20 MG tablet Take 10 mg by mouth once daily.     busPIRone (BUSPAR) 5 MG Tab Take 5 mg by mouth once daily.     diphenhydramine HCl (UNISOM, DIPHENHYDRAMINE, ORAL) Take 1 tablet by mouth nightly as needed.     glipiZIDE (GLUCOTROL) 5 MG TR24 Take 5 mg by mouth 3 (three) times daily with meals.     hydrOXYzine pamoate (VISTARIL) 50 MG Cap Take 50 mg by mouth daily as needed.     lisinopril 10 MG tablet Take 10 mg by mouth once daily.    melatonin 5 mg Tab Take by mouth nightly as needed.     metoprolol succinate (TOPROL-XL) 50 MG 24 hr tablet Take 25 mg by mouth once daily.     paroxetine (PAXIL) 40 MG tablet Take 40 mg by mouth every morning.    traZODone (DESYREL) 50 MG tablet Take 150 mg by mouth every evening.      Family History     Problem Relation (Age of Onset)    Early death Father    Heart disease Mother, Father    Stroke Brother        Social History Main Topics    Smoking status: Current Every Day Smoker     Packs/day: 2.00    Smokeless tobacco: Never Used    Alcohol use No    Drug use: No      Comment: Unable to assess    Sexual activity: Not on file     Review of Systems Comprehensive review of systems is negative except as mentioned in the HPI.   Objective:     Vital Signs (Most Recent):  Temp: 96.2 °F (35.7 °C) (07/15/18 1110)  Pulse: 101 (07/15/18 1205)  Resp: 20 (07/15/18 1205)  BP: 136/72 (07/15/18 1110)  SpO2: (!) 94 % (07/15/18 1205) Vital Signs (24h Range):  Temp:  [96.2 °F (35.7 °C)-99.8 °F (37.7 °C)] 96.2 °F (35.7 °C)  Pulse:  [] 101  Resp:  [18-21] 20  SpO2:  [91 %-98 %] 94 %  BP: (101-138)/() 136/72     Weight: 83.6 kg (184 lb 4.9 oz)  Body mass index is 28.02 kg/m².    Physical Exam        General: Well developed, well nourished.  No acute distress.  HEENT: Atraumatic, normocephalic.  Musculoskeletal:  No obvious joint deformities, moves all extremities well.  Skin: No obvious rashes    Neurological Exam: NIHSS 25  Mental status: sleepy but responds quickly, right neglect, left preference  Speech/Language: moderate dysarthria, expressive aphasia, follows some commands, likely receptive component as well  Cranial nerves: Pupils equal round and reactive to light, roving eye movements, unclear if vision impairment, facial strength and sensation intact bilaterally, palate and tongue midline, hearing grossly intact bilaterally.  Motor: dense right hemiplegia  Sensation: does not feel on the right side, neglect  DTR:hyporeflexic on right  Coordination: no ataxia apparent, unable to assess right side  Gait: unable to assess given hemiplegia    Significant Labs: All pertinent lab results from the past 24 hours have been reviewed.    Significant Imaging: I have reviewed and interpreted all pertinent imaging results/findings within the past 24 hours.    Assessment and Plan:     * Cerebrovascular accident (CVA) due to bilateral thrombosis of carotid arteries    The distribution of the strokes is unusual. I have personally reviewed the MRI brain. Much of the stroke area is watershed between MCA/KAIT/PCA, however, there is also significant ischemic stroke of the left MCA territory. The patient is at high risk for hemorrhagic transformation, swelling, and complications. He is not alert enough nor safe to swallow. I have discussed with Dr. Rutledge and agree the patient needs to be monitored in the ICU setting closely for neurologic changes. I have a low threshold to obtain STAT CT head for any neurologic changes, headache, nausea/vomiting, unresponsiveness. We will obtain workup looking for etiology.     - Frequent neuro checks [id number]  - Cardiac monitoring  - Permissive HTN up to 220/110, holding home antihypertensives except half beta-blockers  - Glucose control  - Fasting lipid panel and HgA1c  - Carotid US, holding on obtaining  CTA or MRA due to renal dysfunction but we may need this for clarification  - Echocardiogram to look for clot, PFO, and atrial enlargement  - PMR consult/PT/OT/Speech to evaluate and treat  - Social work consult for discharge planning  - Aspirin 81 mg daily and agree with adding plavix 75 mg daily, watching closely for hemorrhagic transformation. NO ANTICOAGULATION AT THIS TIME. If thrombus is seeing on echocardiogram, we need to discuss as he is high risk for hemorrhagic transformation.   - Statin for LDL goal <70        GRACIE (acute kidney injury)    IVF hydration, monitoring closely        Leucocytosis    Workup per primary team, risk for aspiration        HLD (hyperlipidemia)             Essential hypertension             Type 2 diabetes mellitus                 VTE Risk Mitigation         Ordered     Place MONAE hose  Until discontinued      07/15/18 1152     IP VTE HIGH RISK PATIENT  Once      07/15/18 1152          Thank you for your consult. I will follow-up with patient. Please contact us if you have any additional questions.    Soco Laureano MD  Neurology  [facility]

## 2018-07-15 NOTE — SUBJECTIVE & OBJECTIVE
Past Medical History:   Diagnosis Date    COPD (chronic obstructive pulmonary disease)     Depression     Diabetes mellitus     Hypertension     Stroke        Past Surgical History:   Procedure Laterality Date    carpal tunel surgery Bilateral     coronary artery endartarectomy      FRACTURE SURGERY      SHOULDER SURGERY Right        Review of patient's allergies indicates:  No Known Allergies    Current Facility-Administered Medications on File Prior to Encounter   Medication    [COMPLETED] 0.9%  NaCl infusion     Current Outpatient Prescriptions on File Prior to Encounter   Medication Sig    aspirin (ECOTRIN) 81 MG EC tablet Take 81 mg by mouth once daily.    atorvastatin (LIPITOR) 20 MG tablet Take 10 mg by mouth once daily.     busPIRone (BUSPAR) 5 MG Tab Take 5 mg by mouth once daily.     diphenhydramine HCl (UNISOM, DIPHENHYDRAMINE, ORAL) Take 1 tablet by mouth nightly as needed.     glipiZIDE (GLUCOTROL) 5 MG TR24 Take 5 mg by mouth 3 (three) times daily with meals.     hydrOXYzine pamoate (VISTARIL) 50 MG Cap Take 50 mg by mouth daily as needed.     lisinopril 10 MG tablet Take 10 mg by mouth once daily.    melatonin 5 mg Tab Take by mouth nightly as needed.     metoprolol succinate (TOPROL-XL) 50 MG 24 hr tablet Take 25 mg by mouth once daily.     paroxetine (PAXIL) 40 MG tablet Take 40 mg by mouth every morning.    traZODone (DESYREL) 50 MG tablet Take 150 mg by mouth every evening.      Family History     Problem Relation (Age of Onset)    Early death Father    Heart disease Mother, Father    Stroke Brother        Social History Main Topics    Smoking status: Current Every Day Smoker     Packs/day: 2.00    Smokeless tobacco: Never Used    Alcohol use No    Drug use: No      Comment: Unable to assess    Sexual activity: Not on file     Review of Systems Comprehensive review of systems is negative except as mentioned in the HPI.   Objective:     Vital Signs (Most Recent):  Temp:  96.2 °F (35.7 °C) (07/15/18 1110)  Pulse: 101 (07/15/18 1205)  Resp: 20 (07/15/18 1205)  BP: 136/72 (07/15/18 1110)  SpO2: (!) 94 % (07/15/18 1205) Vital Signs (24h Range):  Temp:  [96.2 °F (35.7 °C)-99.8 °F (37.7 °C)] 96.2 °F (35.7 °C)  Pulse:  [] 101  Resp:  [18-21] 20  SpO2:  [91 %-98 %] 94 %  BP: (101-138)/() 136/72     Weight: 83.6 kg (184 lb 4.9 oz)  Body mass index is 28.02 kg/m².    Physical Exam        General: Well developed, well nourished.  No acute distress.  HEENT: Atraumatic, normocephalic.  Musculoskeletal: No obvious joint deformities, moves all extremities well.  Skin: No obvious rashes    Neurological Exam: NIHSS 25  Mental status: sleepy but responds quickly, right neglect, left preference  Speech/Language: moderate dysarthria, expressive aphasia, follows some commands, likely receptive component as well  Cranial nerves: Pupils equal round and reactive to light, roving eye movements, unclear if vision impairment, facial strength and sensation intact bilaterally, palate and tongue midline, hearing grossly intact bilaterally.  Motor: dense right hemiplegia  Sensation: does not feel on the right side, neglect  DTR:hyporeflexic on right  Coordination: no ataxia apparent, unable to assess right side  Gait: unable to assess given hemiplegia    Significant Labs: All pertinent lab results from the past 24 hours have been reviewed.    Significant Imaging: I have reviewed and interpreted all pertinent imaging results/findings within the past 24 hours.

## 2018-07-15 NOTE — SUBJECTIVE & OBJECTIVE
Woke up with symptoms?: yes  Last known normal: Last Known Normal Date: 07/14/18 Last Known Normal Time: 1800    Recent bleeding noted: no  Does the patient take any Blood Thinners? no  Medications: Antiplatelets:  aspirin      Past Medical History: hypertension, diabetes and hyperlipidemia    Past Surgical History: no major surgeries within the last 2 weeks    Family History: no relevant history    Social History: smoker (active)    Allergies:   No relevant allergies    Review of Systems   Musculoskeletal: Positive for gait problem.   Neurological: Positive for facial asymmetry, speech difficulty, weakness and numbness.   All other systems reviewed and are negative.    Objective:   Vitals: Blood pressure 109/60, pulse 104, temperature 99.8 °F (37.7 °C), temperature source Rectal, resp. rate 19, weight 90.7 kg (200 lb), SpO2 95 %.     CT READ: Yes  Abnormal CT manuel regions of edema c/w completed stroke. neoplasm not excluded.        Physical Exam   Constitutional: He appears well-developed.   HENT:   Head: Normocephalic.   Eyes: Pupils are equal, round, and reactive to light.   Neck: Normal range of motion.   Cardiovascular: Normal rate.    Pulmonary/Chest: Effort normal.   Abdominal: Soft.   Neurological: A cranial nerve deficit and sensory deficit is present. He exhibits abnormal muscle tone. Coordination abnormal.

## 2018-07-16 PROBLEM — Z98.890 S/P CAROTID ENDARTERECTOMY: Status: RESOLVED | Noted: 2018-07-15 | Resolved: 2018-07-16

## 2018-07-16 LAB
ALBUMIN SERPL BCP-MCNC: 3.1 G/DL
ALP SERPL-CCNC: 80 U/L
ALT SERPL W/O P-5'-P-CCNC: 50 U/L
ANION GAP SERPL CALC-SCNC: 13 MMOL/L
APTT BLDCRRT: 26.4 SEC
AST SERPL-CCNC: 66 U/L
BASOPHILS # BLD AUTO: 0 K/UL
BASOPHILS # BLD AUTO: 0 K/UL
BASOPHILS NFR BLD: 0.3 %
BASOPHILS NFR BLD: 0.3 %
BILIRUB SERPL-MCNC: 0.3 MG/DL
BUN SERPL-MCNC: 51 MG/DL
CALCIUM SERPL-MCNC: 8.9 MG/DL
CHLORIDE SERPL-SCNC: 119 MMOL/L
CK MB SERPL-MCNC: 22.1 NG/ML
CK MB SERPL-RTO: 0.7 %
CK SERPL-CCNC: 3334 U/L
CO2 SERPL-SCNC: 15 MMOL/L
CREAT SERPL-MCNC: 1.8 MG/DL
DIFFERENTIAL METHOD: ABNORMAL
DIFFERENTIAL METHOD: ABNORMAL
EOSINOPHIL # BLD AUTO: 0 K/UL
EOSINOPHIL # BLD AUTO: 0 K/UL
EOSINOPHIL NFR BLD: 0.1 %
EOSINOPHIL NFR BLD: 0.1 %
ERYTHROCYTE [DISTWIDTH] IN BLOOD BY AUTOMATED COUNT: 14.6 %
ERYTHROCYTE [DISTWIDTH] IN BLOOD BY AUTOMATED COUNT: 14.6 %
EST. GFR  (AFRICAN AMERICAN): 43 ML/MIN/1.73 M^2
EST. GFR  (NON AFRICAN AMERICAN): 38 ML/MIN/1.73 M^2
ESTIMATED AVG GLUCOSE: 160 MG/DL
GLUCOSE SERPL-MCNC: 134 MG/DL
HBA1C MFR BLD HPLC: 7.2 %
HCT VFR BLD AUTO: 44.9 %
HCT VFR BLD AUTO: 44.9 %
HGB BLD-MCNC: 14.5 G/DL
HGB BLD-MCNC: 14.5 G/DL
INR PPP: 1.1
LYMPHOCYTES # BLD AUTO: 1.5 K/UL
LYMPHOCYTES # BLD AUTO: 1.5 K/UL
LYMPHOCYTES NFR BLD: 13.5 %
LYMPHOCYTES NFR BLD: 13.5 %
MAGNESIUM SERPL-MCNC: 2.3 MG/DL
MCH RBC QN AUTO: 29.7 PG
MCH RBC QN AUTO: 29.7 PG
MCHC RBC AUTO-ENTMCNC: 32.3 G/DL
MCHC RBC AUTO-ENTMCNC: 32.3 G/DL
MCV RBC AUTO: 92 FL
MCV RBC AUTO: 92 FL
MONOCYTES # BLD AUTO: 0.8 K/UL
MONOCYTES # BLD AUTO: 0.8 K/UL
MONOCYTES NFR BLD: 7.4 %
MONOCYTES NFR BLD: 7.4 %
NEUTROPHILS # BLD AUTO: 8.5 K/UL
NEUTROPHILS # BLD AUTO: 8.5 K/UL
NEUTROPHILS NFR BLD: 78.7 %
NEUTROPHILS NFR BLD: 78.7 %
PHOSPHATE SERPL-MCNC: 3.8 MG/DL
PLATELET # BLD AUTO: 118 K/UL
PLATELET # BLD AUTO: 118 K/UL
PMV BLD AUTO: 10.6 FL
PMV BLD AUTO: 10.6 FL
POTASSIUM SERPL-SCNC: 4.6 MMOL/L
PROT SERPL-MCNC: 6.7 G/DL
PROTHROMBIN TIME: 10.7 SEC
RBC # BLD AUTO: 4.88 M/UL
RBC # BLD AUTO: 4.88 M/UL
SODIUM SERPL-SCNC: 147 MMOL/L
TROPONIN I SERPL DL<=0.01 NG/ML-MCNC: 0.04 NG/ML
TROPONIN I SERPL DL<=0.01 NG/ML-MCNC: 0.04 NG/ML
TROPONIN I SERPL DL<=0.01 NG/ML-MCNC: 0.05 NG/ML
WBC # BLD AUTO: 10.9 K/UL
WBC # BLD AUTO: 10.9 K/UL

## 2018-07-16 PROCEDURE — 97802 MEDICAL NUTRITION INDIV IN: CPT

## 2018-07-16 PROCEDURE — 85610 PROTHROMBIN TIME: CPT

## 2018-07-16 PROCEDURE — 82553 CREATINE MB FRACTION: CPT

## 2018-07-16 PROCEDURE — 82550 ASSAY OF CK (CPK): CPT

## 2018-07-16 PROCEDURE — 84484 ASSAY OF TROPONIN QUANT: CPT | Mod: 91

## 2018-07-16 PROCEDURE — 20000000 HC ICU ROOM

## 2018-07-16 PROCEDURE — 36415 COLL VENOUS BLD VENIPUNCTURE: CPT

## 2018-07-16 PROCEDURE — 92610 EVALUATE SWALLOWING FUNCTION: CPT

## 2018-07-16 PROCEDURE — 94761 N-INVAS EAR/PLS OXIMETRY MLT: CPT

## 2018-07-16 PROCEDURE — 84484 ASSAY OF TROPONIN QUANT: CPT

## 2018-07-16 PROCEDURE — G8996 SWALLOW CURRENT STATUS: HCPCS | Mod: CM

## 2018-07-16 PROCEDURE — 84100 ASSAY OF PHOSPHORUS: CPT

## 2018-07-16 PROCEDURE — 97162 PT EVAL MOD COMPLEX 30 MIN: CPT

## 2018-07-16 PROCEDURE — A4216 STERILE WATER/SALINE, 10 ML: HCPCS | Performed by: INTERNAL MEDICINE

## 2018-07-16 PROCEDURE — 99233 SBSQ HOSP IP/OBS HIGH 50: CPT | Mod: ,,, | Performed by: PSYCHIATRY & NEUROLOGY

## 2018-07-16 PROCEDURE — G8978 MOBILITY CURRENT STATUS: HCPCS | Mod: CN

## 2018-07-16 PROCEDURE — G8997 SWALLOW GOAL STATUS: HCPCS | Mod: CI

## 2018-07-16 PROCEDURE — 27000221 HC OXYGEN, UP TO 24 HOURS

## 2018-07-16 PROCEDURE — G8979 MOBILITY GOAL STATUS: HCPCS | Mod: CN

## 2018-07-16 PROCEDURE — 25000003 PHARM REV CODE 250: Performed by: INTERNAL MEDICINE

## 2018-07-16 PROCEDURE — 80053 COMPREHEN METABOLIC PANEL: CPT

## 2018-07-16 PROCEDURE — 85730 THROMBOPLASTIN TIME PARTIAL: CPT

## 2018-07-16 PROCEDURE — 83735 ASSAY OF MAGNESIUM: CPT

## 2018-07-16 PROCEDURE — 85025 COMPLETE CBC W/AUTO DIFF WBC: CPT

## 2018-07-16 RX ORDER — METOPROLOL TARTRATE 1 MG/ML
5 INJECTION, SOLUTION INTRAVENOUS EVERY 12 HOURS
Status: DISCONTINUED | OUTPATIENT
Start: 2018-07-16 | End: 2018-07-17

## 2018-07-16 RX ORDER — ASPIRIN 81 MG/1
81 TABLET ORAL DAILY
Status: CANCELLED | OUTPATIENT
Start: 2018-07-16

## 2018-07-16 RX ORDER — ATORVASTATIN CALCIUM 40 MG/1
40 TABLET, FILM COATED ORAL DAILY
Status: CANCELLED | OUTPATIENT
Start: 2018-07-16

## 2018-07-16 RX ADMIN — ASPIRIN 300 MG: 300 SUPPOSITORY RECTAL at 10:07

## 2018-07-16 RX ADMIN — SODIUM CHLORIDE, PRESERVATIVE FREE: 5 INJECTION INTRAVENOUS at 02:07

## 2018-07-16 RX ADMIN — SODIUM CHLORIDE, PRESERVATIVE FREE 3 ML: 5 INJECTION INTRAVENOUS at 10:07

## 2018-07-16 RX ADMIN — SODIUM CHLORIDE, PRESERVATIVE FREE 3 ML: 5 INJECTION INTRAVENOUS at 05:07

## 2018-07-16 RX ADMIN — SODIUM CHLORIDE: 0.9 INJECTION, SOLUTION INTRAVENOUS at 03:07

## 2018-07-16 RX ADMIN — SODIUM CHLORIDE: 0.9 INJECTION, SOLUTION INTRAVENOUS at 07:07

## 2018-07-16 NOTE — HOSPITAL COURSE
Patient is a 67-year-old  male with a past medical history significant for type 2 diabetes, coronary artery disease, hypertension who presents the hospital with an acute bilateral CVA.  Patient had severe CVA with NIHSS score initially 18.  He was started on appropriate secondary prevention measures including aspirin, Plavix, statin, and blood pressure control with beta-blocker with allowance of permissive hypertension.  Patient subsequently developed aspiration pneumonia and was started on IV antibiotics.  His O2 sats were low and he was transferred to ICU and placed on BiPAP secondary to profound lethargy likely secondary to his stroke as well as hypercarbic respiratory failure.  Over the course of the next 3 days, the patient slowly improved and his lethargy resolved.  He was moved out of intensive care and unfortunately lacked the ability to swallow.  Speech language pathology evaluated the patient and he was initially set up for percutaneous gastrostomy placement, however he spontaneously began to improve dramatically.  He was re-evaluated by speech language pathology and was started on oral feeding (patient had been fed enterally through nasogastric tube prior) in his diet was advance to soft diet with thin liquids at time of discharge.     He had started to regain strength in his right lower extremity and his speech continued to improve throughout his hospitalization.  Blood pressure initially remain labile likely secondary to autonomic dysregulation but improved over the course of his hospitalization as well.  Patient was seen by vascular Neurology and had neuro imaging done which shows diffuse carotid disease both in the internal carotid intracranially as well as in the neck.  No further procedures were recommended by vascular Neurology.  Given the patient's rapid recovery as well as the course of his rehab, it was felt that he would be a good candidate for inpatient rehab.  He was accepted by [facility]  rehab and the patient will be transferred there.  Patient was seen and examined on the day of discharge and deemed medically stable for discharge. He completed full treatment of antibiotics for his aspiration pneumonia require no further antibiotics post discharge. He will need follow-up with primary care as well as vascular Neurology at discharge.

## 2018-07-16 NOTE — PLAN OF CARE
Problem: Patient Care Overview  Goal: Plan of Care Review  Outcome: Ongoing (interventions implemented as appropriate)  Received from PCU with dx thrombotic stroke, agitation needing sedation for placement of nasogastric tube, nair catheter to provide medications and monitor urine output. Pt very agitated regardless of sedation and noted to have bp readings sys in 110s range. Neurologist contacted re: bp range to be allowed with precedex drip- instructed to stop precedex and leave ng tube out to help decrease agitation associated with performing medical treatments. Dr Rutledge notified of neurologist's recommendations re: bp and routes for medication administration. Pt falling asleep when not stimulated, still moving restlessly, scooting to left side of bed. Continues to have no spontaneous movements of right sided extremities.

## 2018-07-16 NOTE — PROGRESS NOTES
[facility] Medicine  Progress Note    Patient Name: Kel Lock  MRN: [id number]  Patient Class: IP- Inpatient   Admission Date: 7/15/2018  Length of Stay: 1 days  Attending Physician: Naseem Rutledge MD  Primary Care Provider: [facility]        Subjective:     Principal Problem:Cerebrovascular accident (CVA) due to bilateral thrombosis of carotid arteries    HPI:  Mr. Lock is a 67 YOCM with PMH of HTN, DM, HLD, who was last seen by his ex-wife 3 days ago, normal , and apparently was found lying on floor unable to move himself back to the bed after he had a fall, heard by his roommate. The above statement was obtained from the medical record. Family at bed sided include his daughter, son in-law, ex wife, who have not witness any change in his clinical status.   He was presented to the [place] ER with right sided weakness, slurred speech and altered mental status. Telestroke was consulted who calculated his NIH value at 16. Initial CT head showed areas of moderate involutional changes consistent with microvascular ischemic changes with encephalomalacia watershed area of distribution, and subacute infarcts at right and left parieto-occipital cortex.   Pt was communicated with neurology, apparently who recommended MRI of the brain without contrast.   At the time of my interview with the patient, he denied CP, SOB or HA, problem with vision, but he clearly has left preference and right hemineglect. The pt is asking for water since he has touched the floor.       Hospital Course:  -Pt transferred to the unit last night for concerns for sedating   -Remained stable overnight.     Interval History: Pt remained confused and weakn on the right side.     Review of Systems   Constitutional: Positive for activity change (stroke related disability ).   HENT: Negative.    Respiratory: Negative.    Cardiovascular: Negative.    Gastrointestinal: Negative.    Neurological: Positive  for facial asymmetry, speech difficulty and weakness.     Objective:     Vital Signs (Most Recent):  Temp: 98.6 °F (37 °C) (07/16/18 0830)  Pulse: 99 (07/16/18 1330)  Resp: 18 (07/16/18 1330)  BP: (!) 119/59 (07/16/18 1300)  SpO2: (!) 92 % (07/16/18 1330) Vital Signs (24h Range):  Temp:  [98.3 °F (36.8 °C)-98.9 °F (37.2 °C)] 98.6 °F (37 °C)  Pulse:  [] 99  Resp:  [12-34] 18  SpO2:  [90 %-99 %] 92 %  BP: ()/(50-98) 119/59     Weight: 82.1 kg (181 lb)  Body mass index is 27.52 kg/m².    Intake/Output Summary (Last 24 hours) at 07/16/18 1359  Last data filed at 07/16/18 0515   Gross per 24 hour   Intake          1350.06 ml   Output              970 ml   Net           380.06 ml      Physical Exam   Constitutional: He appears well-developed and well-nourished. No distress.   HENT:   Head: Normocephalic and atraumatic.   Left Ear: External ear normal.   Nose: Nose normal.   Eyes: Conjunctivae and EOM are normal. Pupils are equal, round, and reactive to light. Right eye exhibits no discharge. Left eye exhibits no discharge. No scleral icterus.   Neck: Normal range of motion. Neck supple. No JVD present. No tracheal deviation present. No thyromegaly present.   Cardiovascular: Normal rate, normal heart sounds and intact distal pulses.  Exam reveals no gallop and no friction rub.    No murmur heard.  Pulmonary/Chest: Effort normal and breath sounds normal. No respiratory distress. He has no wheezes. He has no rales. He exhibits no tenderness.   Abdominal: Soft. Bowel sounds are normal. He exhibits no distension. There is no rebound and no guarding. No hernia.   Musculoskeletal:   Complete hemiplegia on the right side.   Left sided ROM complete and no apparent deficit.    Lymphadenopathy:     He has no cervical adenopathy.   Neurological: He is alert. He displays normal reflexes. No cranial nerve deficit or sensory deficit. He exhibits normal muscle tone. Coordination normal.   Orientation can not be assessed     Skin: Skin is warm and dry. Capillary refill takes less than 2 seconds. No rash noted. He is not diaphoretic. No erythema. No pallor.   Psychiatric:   Agitation    Nursing note and vitals reviewed.      Significant Labs:   CBC:   Recent Labs  Lab 07/15/18  0757 07/16/18  0329   WBC 14.82* 10.90  10.90   HGB 16.5 14.5  14.5   HCT 49.9 44.9  44.9   * 118*  118*     CMP:   Recent Labs  Lab 07/15/18  0757 07/15/18  1715 07/16/18  0329    144 147*   K 4.8 4.5 4.6    116* 119*   CO2 20* 15* 15*   * 173* 134*   BUN 54* 56* 51*   CREATININE 2.9* 2.3* 1.8*   CALCIUM 9.6 9.2 8.9   PROT 8.4 6.8 6.7   ALBUMIN 4.2 3.1* 3.1*   BILITOT 0.8 0.3 0.3   ALKPHOS 88 83 80   * 84* 66*   ALT 60* 50* 50*   ANIONGAP 12 13 13   EGFRNONAA 21.1* 28* 38*       Significant Imaging: I have reviewed all pertinent imaging results/findings within the past 24 hours.    Assessment/Plan:      * Cerebrovascular accident (CVA) due to bilateral thrombosis of carotid arteries    - Multiple risk factors for stroke, seems to have poor control on health and risk factors  -CT and MRI brain and MRA neck revealed multiple large areas of infarct at watershed areas as well as complete total occlusion of the left carotid with affected narrowing of the right carotid.  -Plan to continue ASA, Plavix and statin for secondary prophylaxis.   -Will follow up on the echo report and assess for the need for SYL.   -Continue aggressive PT/OT.         GRACIE (acute kidney injury)    -Suspect prerenal due to dehydration   -Continue IV fluid for now  -Improving renal functions          Leucocytosis    -Improving with IV hydration.   -Low temp, no focus of infection.   -Will continue to observe for now.           HLD (hyperlipidemia)      -Continue statin.           Essential hypertension      -Currently stable and observing permissive HTN, as per Neurology evaluation.           Type 2 diabetes mellitus    -Hold off oral hypoglycemics,    -Continue insulin sliding scale.           VTE Risk Mitigation         Ordered     Place MONAE hose  Until discontinued      07/15/18 1152     IP VTE HIGH RISK PATIENT  Once      07/15/18 1152          Critical care time spent on the evaluation and treatment of severe organ dysfunction, review of pertinent labs and imaging studies, discussions with consulting providers and discussions with patient/family: 25 minutes.    Naseem Rutledge MD  Department of Hospital Medicine   [facility]

## 2018-07-16 NOTE — PT/OT/SLP EVAL
Physical Therapy Evaluation    Patient Name:  Kel Lock   MRN:  [id number]    Recommendations:     Discharge Recommendations:  rehabilitation facility   Discharge Equipment Recommendations:  (unable to determine at this time)   Barriers to discharge: None    Assessment:     Kel Lock is a 67 y.o. male admitted with a medical diagnosis of Cerebrovascular accident (CVA) due to bilateral thrombosis of carotid arteries.  He presents with the following impairments/functional limitations:  weakness, impaired endurance, impaired functional mobilty, gait instability, impaired balance, impaired cognition, decreased coordination, decreased safety awareness, pain, impaired coordination which limits safe functional mobility.    Rehab Prognosis:  FAIR at this time due to decreased ability to participate today; patient would benefit from acute skilled PT services to address these deficits and reach maximum level of function.      Recent Surgery: * No surgery found *      Plan:     During this hospitalization, patient to be seen 6 x/week to address the above listed problems via gait training, therapeutic activities, therapeutic exercises, neuromuscular re-education  · Plan of Care Expires:  08/10/18   Plan of Care Reviewed with: patient    Subjective     Communicated with nurse Celaya prior to session.  Patient found supine in bed with LLE restrained upon PT entry to room, agreeable to evaluation.      Chief Complaint: pain to left shoulder  Patient comments/goals: minimal verbalizations  Pain/Comfort:  · Pain Rating 1:  (unable to rate but complaints of L shoulder pain with gentle palpation)    Patients cultural, spiritual, Religion conflicts given the current situation:      Living Environment:  Unable to get subjective hx from pt.   Prior to admission, patients level of function was likely independent.  Patient has the following equipment:  (unable to determine).  DME owned (not currently used): unknown.  Upon discharge,  patient will benefit from rehab stay.    Objective:     Patient found with: oxygen, peripheral IV, SCD, telemetry, restraints     General Precautions: Standard, fall   Orthopedic Precautions:N/A   Braces: N/A     Exams:  · RLE ROM: no active movement noted  · RLE Strength: no active movement noted  · LLE ROM: WFL  · LLE Strength: WFL    Functional Mobility:  · Unable to formally participate in mobility due to decreased level of alertness; but likely max A to dependent for all mobility.    AM-PAC 6 CLICK MOBILITY  Total Score:6       Therapeutic Activities and Exercises:       Patient left supine with all lines intact, call button in reach, nurse notified and restraint in place to LLE.    GOALS:    Physical Therapy Goals        Problem: Physical Therapy Goal    Goal Priority Disciplines Outcome Goal Variances Interventions   Physical Therapy Goal     PT/OT, PT Ongoing (interventions implemented as appropriate)     Description:  Goals to be met by: 08/10/2018     Patient will increase functional independence with mobility by performin. Pt will be able to tolerate Supine.  2. Pt will be able to tolerate Sit to supine  3. Pt will be able to tolerate Sit to stand transfer   4. Pt will be able to tolerate Bed to chair transfer with most appropriate device.  5. Pt will be able to tolerate Gait  Activity using most appropriate device.                       History:     Past Medical History:   Diagnosis Date    COPD (chronic obstructive pulmonary disease)     Depression     Diabetes mellitus     Hypertension     Stroke        Past Surgical History:   Procedure Laterality Date    carpal tunel surgery Bilateral     coronary artery endartarectomy      FRACTURE SURGERY      SHOULDER SURGERY Right        Clinical Decision Making:     History  Co-morbidities and personal factors that may impact the plan of care Examination  Body Structures and Functions, activity limitations and participation restrictions that may  impact the plan of care Clinical Presentation   Decision Making/ Complexity Score   Co-morbidities:   [] Time since onset of injury / illness / exacerbation  [] Status of current condition  []Patient's cognitive status and safety concerns    [] Multiple Medical Problems (see med hx)  Personal Factors:   [] Patient's age  [] Prior Level of function   [] Patient's home situation (environment and family support)  [] Patient's level of motivation  [] Expected progression of patient      HISTORY:(criteria)    [] 72851 - no personal factors/history    [] 27501 - has 1-2 personal factor/comorbidity     [] 47359 - has >3 personal factor/comorbidity     Body Regions:  [] Objective examination findings  [] Head     []  Neck  [] Trunk   [] Upper Extremity  [] Lower Extremity    Body Systems:  [] For communication ability, affect, cognition, language, and learning style: the assessment of the ability to make needs known, consciousness, orientation (person, place, and time), expected emotional /behavioral responses, and learning preferences (eg, learning barriers, education  needs)  [] For the neuromuscular system: a general assessment of gross coordinated movement (eg, balance, gait, locomotion, transfers, and transitions) and motor function  (motor control and motor learning)  [] For the musculoskeletal system: the assessment of gross symmetry, gross range of motion, gross strength, height, and weight  [] For the integumentary system: the assessment of pliability(texture), presence of scar formation, skin color, and skin integrity  [] For cardiovascular/pulmonary system: the assessment of heart rate, respiratory rate, blood pressure, and edema     Activity limitations:    [] Patient's cognitive status and saf ety concerns          [] Status of current condition      [] Weight bearing restriction  [] Cardiopulmunary Restriction    Participation Restrictions:   [] Goals and goal agreement with the patient     [] Rehab potential  (prognosis) and probable outcome      Examination of Body System: (criteria)    [] 99163 - addressing 1-2 elements    [] 16841 - addressing a total of 3 or more elements     [] 47866 -  Addressing a total of 4 or more elements         Clinical Presentation: (criteria)  Choose one     On examination of body system using standardized tests and measures patient presents with (CHOOSE ONE) elements from any of the following: body structures and functions, activity limitations, and/or participation restrictions.  Leading to a clinical presentation that is considered (CHOOSE ONE)                              Clinical Decision Making  (Eval Complexity):  Choose One     Time Tracking:     PT Received On: 07/16/18  PT Start Time: 1435     PT Stop Time: 1450  PT Total Time (min): 15 min     Billable Minutes: Evaluation 15      Heaven Keren, PT  07/16/2018

## 2018-07-16 NOTE — PLAN OF CARE
Problem: Patient Care Overview  Goal: Plan of Care Review  Outcome: Ongoing (interventions implemented as appropriate)  Pt awakens to verbal stimuli has expressive aphasia. Oriented to self. Knows daughters name.  No movement to right side except right toes. Follows commands to left side. Left ankle restraint applied for safety during night as pt hanging leg over side rail.  Able to touch nose with fingers. STEPHENIE with eye movement roving. IVF maintained. SR= ST on monitor. Safety measures in place. Traveled for MRA neck during shift. Rotation on. No falls on injury. Family in at visiting hours. Daughter Darshana updated on condition. Password established.

## 2018-07-16 NOTE — PT/OT/SLP EVAL
"Speech Language Pathology Evaluation  Bedside Swallow    Patient Name:  Kel Lock   MRN:  [id number]  Admitting Diagnosis: Cerebrovascular accident (CVA) due to bilateral thrombosis of carotid arteries    Recommendations:                 General Recommendations:  Dysphagia therapy, Speech language evaluation and Cognitive-linguistic evaluation  Diet recommendations:  NPO, NPO   Aspiration Precautions: NPO and Strict aspiration precautions   General Precautions: Standard, NPO, fall  Communication strategies:  yes/no questions only, provide increased time to answer and go to room if call light pushed    History:     Past Medical History:   Diagnosis Date    COPD (chronic obstructive pulmonary disease)     Depression     Diabetes mellitus     Hypertension     Stroke        Past Surgical History:   Procedure Laterality Date    carpal tunel surgery Bilateral     coronary artery endartarectomy      FRACTURE SURGERY      SHOULDER SURGERY Right        Social History: Patient lives with family.    Prior Intubation HX:  None this admit    Modified Barium Swallow: None in EPIC    Chest X-Rays: "Tortuous and dilated thoracic aorta.  Mild hypoventilatory change left lung base." 7/15/18  MRI Brain - Findings consistent with subacute infarcts bilateral parietal occipital watershed distribution more so left and bilateral parietal and smaller scattered areas of subacute lacunar infarct left cerebellum, left thalamus, left basal ganglion head of the caudate nucleus, bilateral frontal.  Also encephalomalacia, microvascular ischemic change, involutional change.  The subacute infarcts correspond as seen on the CT head of 7/15/2015 although more apparent and appearing more extensive on the MRI    Prior diet: Presumed regular and thin.    Subjective     Pt provided no spontaneous comments, altho he responded to most commands & questions with partially self-generated comments including repetition of a portion of the " question/command.    Objective:   Pt was reclined in bed, sleeping. Pt was awake and cooperative, although pt remained lethargic and required multiple prompts to maintain alertness and comply with commands. Pt appeared to be hallucinating reaching for cup, mimicking to bring to mouth when nothing was in front of him. PO trials: ice chip x3 with spontaneous cough on first trial, cup sip noted respiratory changes with struggle and palpated laryngeal roughness, serial straw swallow noted delayed cough, single straw sip no s/s penetration or aspiration, puree noted no cough or throat clear however breathing perceived wet, and solid noted struggle, reduced breathe support, and palpated laryngeal roughness.     Oral Musculature Evaluation  · Oral Musculature: right weakness  · Dentition: scattered dentition, teeth in poor condition  · Secretion Management: problems swallowing secretions (sticky secretions in rear of mouth )  · Mandibular Strength and Mobility: WFL  · Oral Labial Strength and Mobility: WFL  · Lingual Strength and Mobility: functional protrusion, functional strength  · Velar Elevation: WFL  · Buccal Strength and Mobility: WFL    Bedside Swallow Eval:   Consistencies Assessed:  · Thin liquids via ice chip, cup sip, single straw and serial straw swallow  · Puree via spoon  · Mixed consistencies via spoon  · Solids 1/4 cookie     Oral Phase:   · WFL  · Dry mouth    Pharyngeal Phase:   · coughing/choking  · multiple spontaneous swallows  · throat clearing  · wet vocal quality after swallow    Compensatory Strategies  · None    Treatment: Educ re findings and recommendations, and treatment plan. Questions answered.      Assessment:     Kel Lock is a 67 y.o. male with an SLP diagnosis of Aphasia and Dysphagia.  He presented with no dysarthria, & ability to answer simple, personal questions, but decreased attention, possible hallucinations, and s/s of pharyngeal dysphagia. Recommend NPO except ice chips & meds  in puree.  Ongoing BSS. Will follow to complete cognitive-linguistic eval.     Goals:    SLP Goals        Problem: SLP Goal    Goal Priority Disciplines Outcome   SLP Goal     SLP    Description:  1. Ongoing BSS  2. Complete cognitive-linguistic evaluation                    Plan:     · Patient to be seen:  5 x/week   · Plan of Care expires:     · Plan of Care reviewed with:  patient   · SLP Follow-Up:  Yes       Discharge recommendations:      Barriers to Discharge:  None    Time Tracking:     SLP Treatment Date:   07/16/18  Speech Start Time:  1218  Speech Stop Time:  1243     Speech Total Time (min):  25 min    Billable Minutes: Eval Swallow and Oral Function 25    MAAME Gan  07/16/2018

## 2018-07-16 NOTE — PLAN OF CARE
07/15/18 1935   Patient Assessment/Suction   Level of Consciousness (AVPU) responds to voice   Respiratory Effort Mild;Labored   All Lung Fields Breath Sounds Anterior:;equal bilaterally;coarse   Cough Frequency infrequent   PRE-TX-O2-ETCO2   O2 Device (Oxygen Therapy) nasal cannula   $ Is the patient on Low Flow Oxygen? Yes   Flow (L/min) 3   SpO2 97 %   Pulse Oximetry Type Continuous   $ Pulse Oximetry - Multiple Charge Pulse Oximetry - Multiple   Pulse 107   Resp (!) 22   /61

## 2018-07-16 NOTE — PROGRESS NOTES
[facility]  Neurology  Progress Note    Patient Name: Kel Lock  MRN: [id number]  Admission Date: 7/15/2018  Hospital Length of Stay: 1 days  Code Status: Full Code   Attending Provider: Naseem Rutledge MD  Primary Care Physician: [facility] As[facility]   Principal Problem:Cerebrovascular accident (CVA) due to bilateral thrombosis of carotid arteries      Subjective:     Interval History: stable neurologically. Patient denies headache. Continues with right sided weakness, neglect, gaze preference. No seizures. Speech is keeping NPO due to aspiration risk    Current Facility-Administered Medications   Medication Dose Route Frequency Provider Last Rate Last Dose    0.9%  NaCl infusion   Intravenous Continuous Naseem Rutledge  mL/hr at 07/16/18 0331      aspirin suppository 300 mg  300 mg Rectal Daily Naseem Rutledge MD   300 mg at 07/16/18 1000    atorvastatin tablet 40 mg  40 mg Oral QHS Naseem Rutledge MD        busPIRone tablet 5 mg  5 mg Oral Daily Naseem Rutledge MD   Stopped at 07/16/18 0900    clopidogrel tablet 75 mg  75 mg Oral Daily Naseem Rutledge MD   Stopped at 07/16/18 0900    metoprolol injection 5 mg  5 mg Intravenous Q5 Min PRN Naseem Rutledge MD        metoprolol succinate (TOPROL-XL) 24 hr tablet 25 mg  25 mg Oral Daily Naseem Rutledge MD   Stopped at 07/16/18 0900    paroxetine tablet 40 mg  40 mg Oral QAM Naseem Rutledge MD        pneumoc 13-sherif conj-dip cr(PF) 0.5 mL  0.5 mL Intramuscular vaccine x 1 dose Naseem Rutledge MD        polyethylene glycol packet 17 g  17 g Oral Daily Naseem Rutledge MD   Stopped at 07/16/18 0900    sodium chloride 0.9% flush 3 mL  3 mL Intravenous Q8H Naseem Rutledge MD   3 mL at 07/16/18 0517    sulfur hexafluoride microspheres injection 2.4 mL  2.4 mL Intravenous ONCE PRN Jf Tapia MD        traZODone tablet 150 mg  150 mg Oral QHS Naseem Rutledge MD           Review of Systems  Objective:     Vital Signs (Most Recent):  Temp: 98.6 °F (37 °C)  (07/16/18 0830)  Pulse: 99 (07/16/18 1330)  Resp: 18 (07/16/18 1330)  BP: (!) 119/59 (07/16/18 1300)  SpO2: (!) 92 % (07/16/18 1330) Vital Signs (24h Range):  Temp:  [98.3 °F (36.8 °C)-98.9 °F (37.2 °C)] 98.6 °F (37 °C)  Pulse:  [] 99  Resp:  [12-34] 18  SpO2:  [90 %-99 %] 92 %  BP: ()/(50-98) 119/59     Weight: 82.1 kg (181 lb)  Body mass index is 27.52 kg/m².    Physical Exam     gaze preference, right hemiplegia, right sensory loss, right neglect, dysarthria, minimal verbal output, follows commands    Significant Labs: All pertinent lab results from the past 24 hours have been reviewed.    Significant Imaging: I have reviewed and interpreted all pertinent imaging results/findings within the past 24 hours.    Assessment and Plan:     * Cerebrovascular accident (CVA) due to bilateral thrombosis of carotid arteries    I have personally reviewed the MRI brain. Much of the stroke area is watershed between MCA/KAIT/PCA, however, there is also significant ischemic stroke of the left MCA territory. The patient is at high risk for hemorrhagic transformation, swelling, and complications. Will continue to monitor. I have a low threshold to obtain STAT CT head for any neurologic changes, headache, nausea/vomiting, unresponsiveness.     - ok for tx to floor  - Cardiac monitoring  - Permissive HTN up to 220/110, very gradual decrease, avoid hypotension  - Glucose control  - LDL is 63, continue statin  - Carotid US showed carotid occlusion, MRA neck confirmed complete occlusion of the left ICA. Nondiagnostic of the vertebrals due to motion but apparent occlusion of the left vertebral. We cannot obtain CTA neck due to poor renal function.   - Echocardiogram pending  - PMR consult/PT/OT/Speech to evaluate and treat  - Social work consult for discharge planning  - Aspirin 81 mg daily and agree with adding plavix 75 mg daily, watching closely for hemorrhagic transformation. NO ANTICOAGULATION AT THIS TIME. If thrombus is  seeing on echocardiogram, we need to discuss as he is high risk for hemorrhagic transformation.         GRACIE (acute kidney injury)    IVF hydration, monitoring closely        Leucocytosis    Workup per primary team, risk for aspiration        HLD (hyperlipidemia)     continue home statin        Essential hypertension     Gradual lowering, avoid hypotension        Type 2 diabetes mellitus     Glucose control per primary team            VTE Risk Mitigation         Ordered     Place MONAE hose  Until discontinued      07/15/18 1152     IP VTE HIGH RISK PATIENT  Once      07/15/18 1152          Soco Laureano MD  Neurology  [facility]

## 2018-07-16 NOTE — CONSULTS
"  [facility]  Adult Nutrition  Consult Note    SUMMARY     Recommendations    1) Enteral: Diabetasource AC @ 20 mls/hr.  Progress 10 mls/hr Q 6 hrs to goal 70 mls/hr or per pt tolerance. Flush with 30 mls water Q 4 hrs or per MD. Hold if residual >250 mls.  At goal provides 2016 calories, 101 gms protein, 168 gms CHO, 1374 mls free water.    2) When medically appropriate: ADAT to diabetic 2000 calorie, cardiac diet with texture per SLP.     Goals: Pt will receive nutrition within 72 hrs.   Nutrition Goal Status: new  Communication of RD Recs:  (POC, sticky note)    Reason for Assessment    Reason for Assessment: consult  1. Thrombotic stroke    2. Stroke      Past Medical History:   Diagnosis Date    COPD (chronic obstructive pulmonary disease)     Depression     Diabetes mellitus     Hypertension     Stroke      Interdisciplinary Rounds: attended  General Information Comments: Admitted with rt sided weakness, slurred speech, AMS.  Pt not alert. Family/friend not at bedside.  Obtained information from chart and nursing.  NFPE incomplete.     Nutrition Risk Screen    Nutrition Risk Screen: other (see comments) (c/o stomach and sinus problems)    Nutrition/Diet History    Do you have any cultural, spiritual, Yarsanism conflicts, given your current situation?: None  Food Allergies: NKFA    Anthropometrics    Temp: 98.6 °F (37 °C)  Height Method: Estimated  Height: 5' 8"  Height (inches): 68 in  Weight Method: Bed Scale  Weight: 82.1 kg (181 lb)  Weight (lb): 181 lb  Ideal Body Weight (IBW), Male: 154 lb  % Ideal Body Weight, Male (lb): 117.53 lb  BMI (Calculated): 27.6  BMI Grade: 25 - 29.9 - overweight  Usual Body Weight (UBW), k.6 kg (per chart review 18)  % Usual Body Weight: 100.82  % Weight Change From Usual Weight: 0.61 %       Lab/Procedures/Meds    Pertinent Labs Reviewed: reviewed  Lab Results   Component Value Date    ALBUMIN 3.1 (L) 2018     Lab Results   Component " Value Date    WBC 10.90 2018    WBC 10.90 2018     BMP  Lab Results   Component Value Date     (H) 2018    K 4.6 2018     (H) 2018    CO2 15 (L) 2018    BUN 51 (H) 2018    CREATININE 1.8 (H) 2018    CALCIUM 8.9 2018    ANIONGAP 13 2018    ESTGFRAFRICA 43 (A) 2018    EGFRNONAA 38 (A) 2018     Lab Results   Component Value Date    CALCIUM 8.9 2018    PHOS 3.8 2018     Pertinent Medications Reviewed: reviewed  Scheduled Meds:   aspirin  300 mg Rectal Daily    atorvastatin  40 mg Oral QHS    busPIRone  5 mg Oral Daily    clopidogrel  75 mg Oral Daily    metoprolol succinate  25 mg Oral Daily    paroxetine  40 mg Oral QAM    polyethylene glycol  17 g Oral Daily    sodium chloride 0.9%  3 mL Intravenous Q8H    traZODone  150 mg Oral QHS     Continuous Infusions:   sodium chloride 0.9% 150 mL/hr at 18 0331       Physical Findings/Assessment    Overall Physical Appearance:  (appears larger than BMI suggests)  Oral/Mouth Cavity: tooth/teeth missing  Skin:  (James score 13)    Estimated/Assessed Needs    Weight Used For Calorie Calculations: 82.1 kg (181 lb) (Actual weight is within range of NHANES SBW)  25-35 kcal/[id number]        Weight Used For Protein Calculations: 82.1 kg (181 lb)   0.8-1.2 gm/kg/day: 66-99     Fluid Need Method:  (per primary team or UOP + 500 mls/day)     CHO Requirement: 45-50% EEN or <5 GIR      Nutrition Prescription Ordered    Current Diet Order: NPO  Nutrition Order Comments: per SLP rec    Evaluation of Received Nutrient/Fluid Intake    IV Fluid (mL): 150 (mls/hr)  Energy Calories Required: not meeting needs  Protein Required: not meeting needs  Fluid Required:  (per primary team)  % Intake of Estimated Energy Needs: 0 - 25 %  % Meal Intake: NPO    Nutrition Risk    Level of Risk/Frequency of Follow-up:  (2 x wkly)     Assessment and Plan    Inadequate energy intake r/t inability  to consume sufficient energy as evidenced by NPO with no alternative nutrition      Monitor and Evaluation    Food and Nutrient Intake: energy intake  Food and Nutrient Adminstration: diet order  Anthropometric Measurements: weight, weight change  Biochemical Data, Medical Tests and Procedures: electrolyte and renal panel, glucose/endocrine profile, inflammatory profile  Nutrition-Focused Physical Findings: overall appearance, skin     Nutrition Follow-Up  2 x wkly    Discharge Plan    To be determined

## 2018-07-16 NOTE — PROGRESS NOTES
Spoke with Dr Laureano regarding pt going to [facility] since test outside with no monitors Updated pt vital signs, etc. Stated OK for pt to travel as test needed .

## 2018-07-16 NOTE — NURSING
Spoke with Dr. Coleman regarding labs, elevated Na., and inability to take po meds.  Pending swallow study. Plans to change Lopressor to IV if fails swallow study. IVF's decreased to 100cc hour.  Informed pt having short periods of apnea. Continue to monitor.

## 2018-07-16 NOTE — PLAN OF CARE
Problem: Patient Care Overview  Goal: Individualization & Mutuality  Recommendations    1) Enteral: Diabetasource AC @ 20 mls/hr.  Progress 10 mls/hr Q 6 hrs to goal 70 mls/hr or per pt tolerance. Flush with 30 mls water Q 4 hrs or per MD. Hold if residual >250 mls.  At goal provides 2016 calories, 101 gms protein, 168 gms CHO, 1374 mls free water.    2) When medically appropriate: ADAT to diabetic 2000 calorie, cardiac diet with texture per SLP.     Goals: Pt will receive nutrition within 72 hrs.   Nutrition Goal Status: new  Communication of RD Recs:  (POC, sticky note)

## 2018-07-16 NOTE — NURSING
Call to update on condition.  Neuro unchanged but continues to have intermittent, short periods of apnea.  Maintaining O2 sats in 90's.  Orders to keep in ICU.

## 2018-07-16 NOTE — PROGRESS NOTES
Results for CHANTE MONCADA (MRN [id number]) as of 7/16/2018 06:10   Ref. Range 7/16/2018 03:29   CPK Latest Ref Range: 20 - 200 U/L 3334 (H)   CPK MB Latest Ref Range: 0.1 - 6.5 ng/mL 22.1 (H)   MB% Latest Ref Range: 0.0 - 5.0 % 0.7   Troponin I Latest Ref Range: 0.000 - 0.026 ng/mL 0.049 (H)   Sent to HEYDI Khalil NP per secure chat. Serial troponins ordered

## 2018-07-16 NOTE — PLAN OF CARE
Problem: Physical Therapy Goal  Goal: Physical Therapy Goal  Goals to be met by: 08/10/2018     Patient will increase functional independence with mobility by performin. Pt will be able to tolerate Supine.  2. Pt will be able to tolerate Sit to supine  3. Pt will be able to tolerate Sit to stand transfer   4. Pt will be able to tolerate Bed to chair transfer with most appropriate device.  5. Pt will be able to tolerate Gait  Activity using most appropriate device.     Outcome: Ongoing (interventions implemented as appropriate)  PT eval initiated, pt was unable to fully participate due to confusion and decreased ability to remain awake during session. During attempts to facilitate regard of R side pt became agitated and started to refuse. Pt was left supine in bed w/ needs in reach and lines intact; LLE still restrained. Nursing notified

## 2018-07-16 NOTE — SUBJECTIVE & OBJECTIVE
Subjective:     Interval History: stable neurologically. Patient denies headache. Continues with right sided weakness, neglect, gaze preference. No seizures. Speech is keeping NPO due to aspiration risk    Current Facility-Administered Medications   Medication Dose Route Frequency Provider Last Rate Last Dose    0.9%  NaCl infusion   Intravenous Continuous Naseem Rutledge  mL/hr at 07/16/18 0331      aspirin suppository 300 mg  300 mg Rectal Daily Naseem Rutledge MD   300 mg at 07/16/18 1000    atorvastatin tablet 40 mg  40 mg Oral QHS Naseem Rutledge MD        busPIRone tablet 5 mg  5 mg Oral Daily Naseem Rutledge MD   Stopped at 07/16/18 0900    clopidogrel tablet 75 mg  75 mg Oral Daily Naseem Rutledge MD   Stopped at 07/16/18 0900    metoprolol injection 5 mg  5 mg Intravenous Q5 Min PRN Naseem Rutledge MD        metoprolol succinate (TOPROL-XL) 24 hr tablet 25 mg  25 mg Oral Daily Naseem Rutledge MD   Stopped at 07/16/18 0900    paroxetine tablet 40 mg  40 mg Oral QAM Naseem Rutledge MD        pneumoc 13-sherif conj-dip cr(PF) 0.5 mL  0.5 mL Intramuscular vaccine x 1 dose Naseem Rutledge MD        polyethylene glycol packet 17 g  17 g Oral Daily Naseem Rutledge MD   Stopped at 07/16/18 0900    sodium chloride 0.9% flush 3 mL  3 mL Intravenous Q8H Naseem Rutledge MD   3 mL at 07/16/18 0517    sulfur hexafluoride microspheres injection 2.4 mL  2.4 mL Intravenous ONCE PRN Jf Tapia MD        traZODone tablet 150 mg  150 mg Oral QHS Naseem Rutledge MD           Review of Systems  Objective:     Vital Signs (Most Recent):  Temp: 98.6 °F (37 °C) (07/16/18 0830)  Pulse: 99 (07/16/18 1330)  Resp: 18 (07/16/18 1330)  BP: (!) 119/59 (07/16/18 1300)  SpO2: (!) 92 % (07/16/18 1330) Vital Signs (24h Range):  Temp:  [98.3 °F (36.8 °C)-98.9 °F (37.2 °C)] 98.6 °F (37 °C)  Pulse:  [] 99  Resp:  [12-34] 18  SpO2:  [90 %-99 %] 92 %  BP: ()/(50-98) 119/59     Weight: 82.1 kg (181 lb)  Body mass index is 27.52 kg/m².    Physical  Exam     gaze preference, right hemiplegia, right sensory loss, right neglect, dysarthria, minimal verbal output, follows commands    Significant Labs: All pertinent lab results from the past 24 hours have been reviewed.    Significant Imaging: I have reviewed and interpreted all pertinent imaging results/findings within the past 24 hours.

## 2018-07-16 NOTE — SUBJECTIVE & OBJECTIVE
Interval History: Pt remained confused and weakn on the right side.     Review of Systems   Constitutional: Positive for activity change (stroke related disability ).   HENT: Negative.    Respiratory: Negative.    Cardiovascular: Negative.    Gastrointestinal: Negative.    Neurological: Positive for facial asymmetry, speech difficulty and weakness.     Objective:     Vital Signs (Most Recent):  Temp: 98.6 °F (37 °C) (07/16/18 0830)  Pulse: 99 (07/16/18 1330)  Resp: 18 (07/16/18 1330)  BP: (!) 119/59 (07/16/18 1300)  SpO2: (!) 92 % (07/16/18 1330) Vital Signs (24h Range):  Temp:  [98.3 °F (36.8 °C)-98.9 °F (37.2 °C)] 98.6 °F (37 °C)  Pulse:  [] 99  Resp:  [12-34] 18  SpO2:  [90 %-99 %] 92 %  BP: ()/(50-98) 119/59     Weight: 82.1 kg (181 lb)  Body mass index is 27.52 kg/m².    Intake/Output Summary (Last 24 hours) at 07/16/18 1359  Last data filed at 07/16/18 0515   Gross per 24 hour   Intake          1350.06 ml   Output              970 ml   Net           380.06 ml      Physical Exam   Constitutional: He appears well-developed and well-nourished. No distress.   HENT:   Head: Normocephalic and atraumatic.   Left Ear: External ear normal.   Nose: Nose normal.   Eyes: Conjunctivae and EOM are normal. Pupils are equal, round, and reactive to light. Right eye exhibits no discharge. Left eye exhibits no discharge. No scleral icterus.   Neck: Normal range of motion. Neck supple. No JVD present. No tracheal deviation present. No thyromegaly present.   Cardiovascular: Normal rate, normal heart sounds and intact distal pulses.  Exam reveals no gallop and no friction rub.    No murmur heard.  Pulmonary/Chest: Effort normal and breath sounds normal. No respiratory distress. He has no wheezes. He has no rales. He exhibits no tenderness.   Abdominal: Soft. Bowel sounds are normal. He exhibits no distension. There is no rebound and no guarding. No hernia.   Musculoskeletal:   Complete hemiplegia on the right side.    Left sided ROM complete and no apparent deficit.    Lymphadenopathy:     He has no cervical adenopathy.   Neurological: He is alert. He displays normal reflexes. No cranial nerve deficit or sensory deficit. He exhibits normal muscle tone. Coordination normal.   Orientation can not be assessed    Skin: Skin is warm and dry. Capillary refill takes less than 2 seconds. No rash noted. He is not diaphoretic. No erythema. No pallor.   Psychiatric:   Agitation    Nursing note and vitals reviewed.      Significant Labs:   CBC:   Recent Labs  Lab 07/15/18  0757 07/16/18  0329   WBC 14.82* 10.90  10.90   HGB 16.5 14.5  14.5   HCT 49.9 44.9  44.9   * 118*  118*     CMP:   Recent Labs  Lab 07/15/18  0757 07/15/18  1715 07/16/18  0329    144 147*   K 4.8 4.5 4.6    116* 119*   CO2 20* 15* 15*   * 173* 134*   BUN 54* 56* 51*   CREATININE 2.9* 2.3* 1.8*   CALCIUM 9.6 9.2 8.9   PROT 8.4 6.8 6.7   ALBUMIN 4.2 3.1* 3.1*   BILITOT 0.8 0.3 0.3   ALKPHOS 88 83 80   * 84* 66*   ALT 60* 50* 50*   ANIONGAP 12 13 13   EGFRNONAA 21.1* 28* 38*       Significant Imaging: I have reviewed all pertinent imaging results/findings within the past 24 hours.

## 2018-07-16 NOTE — ASSESSMENT & PLAN NOTE
-Improving with IV hydration.   -Low temp, no focus of infection.   -Will continue to observe for now.

## 2018-07-16 NOTE — ASSESSMENT & PLAN NOTE
I have personally reviewed the MRI brain. Much of the stroke area is watershed between MCA/KAIT/PCA, however, there is also significant ischemic stroke of the left MCA territory. The patient is at high risk for hemorrhagic transformation, swelling, and complications. Will continue to monitor. I have a low threshold to obtain STAT CT head for any neurologic changes, headache, nausea/vomiting, unresponsiveness.     - ok for tx to floor  - Cardiac monitoring  - Permissive HTN up to 220/110, very gradual decrease, avoid hypotension  - Glucose control  - LDL is 63, continue statin  - Carotid US showed carotid occlusion, MRA neck confirmed complete occlusion of the left ICA. Nondiagnostic of the vertebrals due to motion but apparent occlusion of the left vertebral. We cannot obtain CTA neck due to poor renal function.   - Echocardiogram pending  - PMR consult/PT/OT/Speech to evaluate and treat  - Social work consult for discharge planning  - Aspirin 81 mg daily and agree with adding plavix 75 mg daily, watching closely for hemorrhagic transformation. NO ANTICOAGULATION AT THIS TIME. If thrombus is seeing on echocardiogram, we need to discuss as he is high risk for hemorrhagic transformation.

## 2018-07-17 PROBLEM — E44.0 MALNUTRITION OF MODERATE DEGREE: Status: ACTIVE | Noted: 2018-07-17

## 2018-07-17 PROBLEM — D72.829 LEUCOCYTOSIS: Status: RESOLVED | Noted: 2018-07-15 | Resolved: 2018-07-17

## 2018-07-17 LAB
ALBUMIN SERPL BCP-MCNC: 2.9 G/DL
ALP SERPL-CCNC: 78 U/L
ALT SERPL W/O P-5'-P-CCNC: 37 U/L
ANION GAP SERPL CALC-SCNC: 9 MMOL/L
AST SERPL-CCNC: 38 U/L
BASOPHILS # BLD AUTO: 0.1 K/UL
BASOPHILS NFR BLD: 0.9 %
BILIRUB SERPL-MCNC: 0.4 MG/DL
BUN SERPL-MCNC: 24 MG/DL
CALCIUM SERPL-MCNC: 8.7 MG/DL
CHLORIDE SERPL-SCNC: 123 MMOL/L
CO2 SERPL-SCNC: 17 MMOL/L
CREAT SERPL-MCNC: 1.1 MG/DL
DIFFERENTIAL METHOD: ABNORMAL
EOSINOPHIL # BLD AUTO: 0.1 K/UL
EOSINOPHIL NFR BLD: 1.1 %
ERYTHROCYTE [DISTWIDTH] IN BLOOD BY AUTOMATED COUNT: 14.6 %
EST. GFR  (AFRICAN AMERICAN): >60 ML/MIN/1.73 M^2
EST. GFR  (NON AFRICAN AMERICAN): >60 ML/MIN/1.73 M^2
GLUCOSE SERPL-MCNC: 121 MG/DL
HCT VFR BLD AUTO: 43 %
HGB BLD-MCNC: 13.9 G/DL
LYMPHOCYTES # BLD AUTO: 1.2 K/UL
LYMPHOCYTES NFR BLD: 14.5 %
MCH RBC QN AUTO: 29.8 PG
MCHC RBC AUTO-ENTMCNC: 32.3 G/DL
MCV RBC AUTO: 92 FL
MONOCYTES # BLD AUTO: 0.7 K/UL
MONOCYTES NFR BLD: 8.6 %
NEUTROPHILS # BLD AUTO: 6.1 K/UL
NEUTROPHILS NFR BLD: 74.9 %
PLATELET # BLD AUTO: 111 K/UL
PMV BLD AUTO: 9.5 FL
POCT GLUCOSE: 118 MG/DL (ref 70–110)
POTASSIUM SERPL-SCNC: 4.2 MMOL/L
PROT SERPL-MCNC: 6.3 G/DL
RBC # BLD AUTO: 4.66 M/UL
SODIUM SERPL-SCNC: 149 MMOL/L
WBC # BLD AUTO: 8.1 K/UL

## 2018-07-17 PROCEDURE — 94761 N-INVAS EAR/PLS OXIMETRY MLT: CPT

## 2018-07-17 PROCEDURE — 99233 SBSQ HOSP IP/OBS HIGH 50: CPT | Mod: ,,, | Performed by: PSYCHIATRY & NEUROLOGY

## 2018-07-17 PROCEDURE — 27000221 HC OXYGEN, UP TO 24 HOURS

## 2018-07-17 PROCEDURE — 11000001 HC ACUTE MED/SURG PRIVATE ROOM

## 2018-07-17 PROCEDURE — 63600175 PHARM REV CODE 636 W HCPCS

## 2018-07-17 PROCEDURE — 25000003 PHARM REV CODE 250: Performed by: HOSPITALIST

## 2018-07-17 PROCEDURE — 25000003 PHARM REV CODE 250: Performed by: INTERNAL MEDICINE

## 2018-07-17 PROCEDURE — 63600175 PHARM REV CODE 636 W HCPCS: Performed by: HOSPITALIST

## 2018-07-17 PROCEDURE — A4216 STERILE WATER/SALINE, 10 ML: HCPCS | Performed by: INTERNAL MEDICINE

## 2018-07-17 PROCEDURE — 36415 COLL VENOUS BLD VENIPUNCTURE: CPT

## 2018-07-17 PROCEDURE — 85025 COMPLETE CBC W/AUTO DIFF WBC: CPT

## 2018-07-17 PROCEDURE — 80053 COMPREHEN METABOLIC PANEL: CPT

## 2018-07-17 PROCEDURE — 97110 THERAPEUTIC EXERCISES: CPT

## 2018-07-17 RX ORDER — SODIUM CHLORIDE 0.9 % (FLUSH) 0.9 %
3 SYRINGE (ML) INJECTION EVERY 8 HOURS
Status: DISCONTINUED | OUTPATIENT
Start: 2018-07-17 | End: 2018-07-22

## 2018-07-17 RX ORDER — CLOPIDOGREL BISULFATE 75 MG/1
75 TABLET ORAL DAILY
Status: DISCONTINUED | OUTPATIENT
Start: 2018-07-18 | End: 2018-07-25

## 2018-07-17 RX ORDER — ATORVASTATIN CALCIUM 40 MG/1
40 TABLET, FILM COATED ORAL NIGHTLY
Status: DISCONTINUED | OUTPATIENT
Start: 2018-07-17 | End: 2018-07-27

## 2018-07-17 RX ORDER — METOPROLOL SUCCINATE 50 MG/1
50 TABLET, EXTENDED RELEASE ORAL DAILY
Status: DISCONTINUED | OUTPATIENT
Start: 2018-07-18 | End: 2018-07-24

## 2018-07-17 RX ORDER — TRAZODONE HYDROCHLORIDE 50 MG/1
150 TABLET ORAL NIGHTLY
Status: DISCONTINUED | OUTPATIENT
Start: 2018-07-17 | End: 2018-07-27

## 2018-07-17 RX ORDER — PAROXETINE HYDROCHLORIDE 20 MG/1
40 TABLET, FILM COATED ORAL EVERY MORNING
Status: DISCONTINUED | OUTPATIENT
Start: 2018-07-18 | End: 2018-07-27

## 2018-07-17 RX ORDER — CLONIDINE 0.3 MG/24H
1 PATCH, EXTENDED RELEASE TRANSDERMAL
Status: DISCONTINUED | OUTPATIENT
Start: 2018-07-18 | End: 2018-07-27

## 2018-07-17 RX ORDER — LORAZEPAM 2 MG/ML
INJECTION INTRAMUSCULAR
Status: COMPLETED
Start: 2018-07-17 | End: 2018-07-17

## 2018-07-17 RX ORDER — INSULIN ASPART 100 [IU]/ML
0-5 INJECTION, SOLUTION INTRAVENOUS; SUBCUTANEOUS
Status: DISCONTINUED | OUTPATIENT
Start: 2018-07-17 | End: 2018-07-30 | Stop reason: HOSPADM

## 2018-07-17 RX ORDER — LORAZEPAM 2 MG/ML
1 INJECTION INTRAMUSCULAR EVERY 10 MIN PRN
Status: COMPLETED | OUTPATIENT
Start: 2018-07-17 | End: 2018-07-20

## 2018-07-17 RX ORDER — BUSPIRONE HYDROCHLORIDE 5 MG/1
5 TABLET ORAL DAILY
Status: DISCONTINUED | OUTPATIENT
Start: 2018-07-18 | End: 2018-07-27

## 2018-07-17 RX ORDER — GLUCAGON 1 MG
1 KIT INJECTION
Status: DISCONTINUED | OUTPATIENT
Start: 2018-07-17 | End: 2018-07-30 | Stop reason: HOSPADM

## 2018-07-17 RX ORDER — ENOXAPARIN SODIUM 100 MG/ML
40 INJECTION SUBCUTANEOUS EVERY 24 HOURS
Status: DISCONTINUED | OUTPATIENT
Start: 2018-07-17 | End: 2018-07-30 | Stop reason: HOSPADM

## 2018-07-17 RX ADMIN — ENOXAPARIN SODIUM 40 MG: 100 INJECTION SUBCUTANEOUS at 04:07

## 2018-07-17 RX ADMIN — SODIUM CHLORIDE, PRESERVATIVE FREE 3 ML: 5 INJECTION INTRAVENOUS at 02:07

## 2018-07-17 RX ADMIN — POLYETHYLENE GLYCOL (3350) 17 G: 17 POWDER, FOR SOLUTION ORAL at 01:07

## 2018-07-17 RX ADMIN — LORAZEPAM 1 MG: 2 INJECTION, SOLUTION INTRAMUSCULAR; INTRAVENOUS at 10:07

## 2018-07-17 RX ADMIN — SODIUM CHLORIDE: 0.9 INJECTION, SOLUTION INTRAVENOUS at 01:07

## 2018-07-17 RX ADMIN — SODIUM CHLORIDE: 0.9 INJECTION, SOLUTION INTRAVENOUS at 04:07

## 2018-07-17 RX ADMIN — CLOPIDOGREL BISULFATE 75 MG: 75 TABLET ORAL at 01:07

## 2018-07-17 RX ADMIN — SODIUM CHLORIDE, PRESERVATIVE FREE 3 ML: 5 INJECTION INTRAVENOUS at 06:07

## 2018-07-17 RX ADMIN — PAROXETINE HYDROCHLORIDE 40 MG: 20 TABLET, FILM COATED ORAL at 01:07

## 2018-07-17 RX ADMIN — TRAZODONE HYDROCHLORIDE 150 MG: 50 TABLET ORAL at 10:07

## 2018-07-17 RX ADMIN — SODIUM CHLORIDE, PRESERVATIVE FREE 3 ML: 5 INJECTION INTRAVENOUS at 10:07

## 2018-07-17 RX ADMIN — ATORVASTATIN CALCIUM 40 MG: 40 TABLET, FILM COATED ORAL at 10:07

## 2018-07-17 NOTE — PROGRESS NOTES
07/17/18 0754   Patient Assessment/Suction   Level of Consciousness (AVPU) responds to voice   PRE-TX-O2-ETCO2   O2 Device (Oxygen Therapy) nasal cannula   $ Is the patient on Low Flow Oxygen? Yes   Flow (L/min) 2   SpO2 97 %   Pulse Oximetry Type Continuous   $ Pulse Oximetry - Multiple Charge Pulse Oximetry - Multiple   Pulse 87   Resp 13

## 2018-07-17 NOTE — SUBJECTIVE & OBJECTIVE
Subjective:     Interval History: NG tube placed, stable but needing sedation to keep NG tube in, agitated    Current Facility-Administered Medications   Medication Dose Route Frequency Provider Last Rate Last Dose    0.9%  NaCl infusion   Intravenous Continuous Naseem Rutledge  mL/hr at 07/17/18 1348      aspirin suppository 300 mg  300 mg Rectal Daily Naseem Rutledge MD   Stopped at 07/17/18 0900    atorvastatin tablet 40 mg  40 mg Oral QHS Naseem Rutledge MD        busPIRone tablet 5 mg  5 mg Oral Daily Naseem Rutledge MD   Stopped at 07/16/18 0900    clopidogrel tablet 75 mg  75 mg Oral Daily Naseem Rutledge MD   75 mg at 07/17/18 1315    enoxaparin injection 40 mg  40 mg Subcutaneous Daily Carrington Garcia MD        lorazepam injection 1 mg  1 mg Intravenous Q10 Min PRN Carrington Garcia MD   1 mg at 07/17/18 1030    metoprolol injection 5 mg  5 mg Intravenous Q5 Min PRN Naseem Rutledge MD        metoprolol injection 5 mg  5 mg Intravenous Q12H Naseem Rutledge MD   Stopped at 07/17/18 0900    paroxetine tablet 40 mg  40 mg Oral QAM Naseem Rutledge MD   40 mg at 07/17/18 1316    pneumoc 13-sherif conj-dip cr(PF) 0.5 mL  0.5 mL Intramuscular vaccine x 1 dose Naseem Rutledge MD        polyethylene glycol packet 17 g  17 g Oral Daily Naseem Rutledge MD   17 g at 07/17/18 1316    sodium chloride 0.9% flush 3 mL  3 mL Intravenous Q8H Naseem Rutledge MD   3 mL at 07/17/18 1455    sulfur hexafluoride microspheres injection 2.4 mL  2.4 mL Intravenous ONCE PRN Jf Tapia MD        traZODone tablet 150 mg  150 mg Oral QHS Naseem Rutledge MD           Review of Systems   Unable to perform ROS: Mental status change     Objective:     Vital Signs (Most Recent):  Temp: 97.6 °F (36.4 °C) (07/17/18 1200)  Pulse: 109 (07/17/18 1445)  Resp: 20 (07/17/18 1445)  BP: (!) 198/95 (07/17/18 1430)  SpO2: 99 % (07/17/18 1445) Vital Signs (24h Range):  Temp:  [97.6 °F (36.4 °C)-98.9 °F (37.2 °C)] 97.6 °F (36.4 °C)  Pulse:  [] 109  Resp:  [12-33]  20  SpO2:  [90 %-99 %] 99 %  BP: (105-220)/(61-98) 198/95     Weight: 84.3 kg (185 lb 13.6 oz)  Body mass index is 28.26 kg/m².    Physical Exam     Sleeping, apneic breathing, awakes to voice, responds, PERRL, right hemiplegia    Significant Labs: All pertinent lab results from the past 24 hours have been reviewed.    Significant Imaging: I have reviewed and interpreted all pertinent imaging results/findings within the past 24 hours.

## 2018-07-17 NOTE — SUBJECTIVE & OBJECTIVE
Interval History: Patient seen and examined.  Overnight, patient's blood pressure continued to spike in the 190s to 100 systolic and patient continued to be agitated requiring restraints to be placed on his mobile side.  Nasogastric tube was attempted insertion yesterday without success secondary to patient agitation.  This morning, patient remains calm and at his mental baseline.  He is still profoundly aphasic and hemiplegic.    Review of Systems   Unable to perform ROS: Mental status change     Objective:     Vital Signs (Most Recent):  Temp: 98.4 °F (36.9 °C) (07/17/18 1530)  Pulse: 110 (07/17/18 1600)  Resp: 20 (07/17/18 1600)  BP: (!) 192/89 (07/17/18 1600)  SpO2: 99 % (07/17/18 1600) Vital Signs (24h Range):  Temp:  [97.6 °F (36.4 °C)-98.9 °F (37.2 °C)] 98.4 °F (36.9 °C)  Pulse:  [] 110  Resp:  [12-33] 20  SpO2:  [90 %-99 %] 99 %  BP: (105-220)/(61-98) 192/89     Weight: 84.3 kg (185 lb 13.6 oz)  Body mass index is 28.26 kg/m².    Intake/Output Summary (Last 24 hours) at 07/17/18 1636  Last data filed at 07/17/18 1522   Gross per 24 hour   Intake           2887.5 ml   Output             1945 ml   Net            942.5 ml      Physical Exam   General exam-elderly  male who is lethargic and altered.     HEENT-pupils sluggish but reactive.  Extraocular movements appear intact.  Oropharynx is clear and dry  Neck exam-no JVD or thyromegaly  Cardiovascular-regular rate rhythm no murmurs gallops rubs  Respiratory-bilateral sonorous breath sounds noted.  No increased work of breathing noted.  Abdomen-soft, nontender nondistended.  Bowel sounds are normoactive.  Extremities-no cyanosis, clubbing, edema.  Noted mild mottling on the left lower extremity.  Pulses are 2+ and symmetric.  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  Right emelia-neglect also noted. Patient gait was not assessed secondary to hemiplegia.    Significant Labs: All pertinent labs within the  past 24 hours have been reviewed.    Significant Imaging: I have reviewed all pertinent imaging results/findings within the past 24 hours.

## 2018-07-17 NOTE — NURSING
"Multiple nurses in to assist with NG tube placement. Pt pulling at restraints, kicking left leg. Ativan obtained and given. Pt kept coughing up the NG tube.pt kept saying "No I don't want it" pt scooted down in bed trying to hit and kick with left arm and leg.  After several attempts the ativan calmed pt down and he swallowed the tube.pt resting at this time.  "

## 2018-07-17 NOTE — PT/OT/SLP PROGRESS
Speech Language Pathology      Kel Lock  MRN: [id number]    Patient not seen today secondary to lethargy. Will follow-up.    MAAME Gan

## 2018-07-17 NOTE — PLAN OF CARE
"Met with pt's daughter Darshana, pt's only child,  to complete pt's assessment.  [place]'s correct [place] is [address] [place] [address] and phone is [phone].  Admissions was updated.  Pt, who lives in a [place] trailor lives by himself however his brother has been staying off and on with him for the past month.  Pt had a glucometer however pt's daughter said that he never used it and probably still does not use it.  [facility] [place] and daughter thinks it might be Dr. Mcrae.  [facility] who [facility] him his medication for depression.  Daughter believes pt's dx is "manic depression" and denies that pt has ever been in a psych hospital.  Pt reportedly has been more depressed since his daughters death three years ago.  Pt's insurance is Medicare, part A only.  Daughter uncertain why pt does not have part B.  Pt's discharge disposition is undetermined at this time.  Awaiting PT/OT eval to determine their recommendation for discharge and check to see if pt has part B Medicare.  CM will continue to follow.      07/17/18 1240   Discharge Assessment   Assessment Type Discharge Planning Assessment   Confirmed/corrected address and phone number on facesheet? Yes  (Pt's correct [place] is [address] [place] [address]. )   Assessment information obtained from? Other  (Pt's daughter Darshana in the ICU waiting area. )   Prior to hospitilization cognitive status: Alert/Oriented   Prior to hospitalization functional status: Independent   Current cognitive status: Unable to Assess   Current Functional Status: Needs Assistance   Lives With alone;other (see comments)  (Pt lives alone however his brother has been stayin with him off an on for a month.)   Able to Return to Prior Arrangements unable to determine at this time (comments)   Is patient able to care for self after discharge? Unable to determine at this time (comments)   Who are your caregiver(s) and their phone number(s)? " (daughter Darshana [phone])   Patient's perception of discharge disposition other (comments)  (undetermined)   Readmission Within The Last 30 Days no previous admission in last 30 days   Patient currently being followed by outpatient case management? No   Patient currently receives any other outside agency services? No   Equipment Currently Used at Home other (see comments)  (Pt had a glucometer however daughter states he did not use it. )   Do you have any problems affording any of your prescribed medications? No  ([facility] [place])   Is the patient taking medications as prescribed? yes   Does the patient have transportation home? Yes   Transportation Available family or friend will provide   Does the patient receive services at the [facility]? No   Discharge Plan A Home Health   Discharge Plan B Skilled Nursing Facility   Patient/Family In Agreement With Plan yes

## 2018-07-17 NOTE — ASSESSMENT & PLAN NOTE
Patient with large bilateral CVA.  Per depart seems to rest mostly on the left side.  Etiology related to both either hypertensive/embolic given bilateral nature of the stroke.  Echocardiogram pending.  Patient unable to tolerate anything by mouth.  Will re-attempt insertion of nasogastric tube to deliver oral medications and enteral feedings.  Continue speech, PT, OT.  NIH SS score 16 per my assessment.  Will follow up with Neurology recommendations, avoid anticoagulation per their recommendations, and attempt to continue anti-platelet therapy as well as statin therapy.  Continue permissive hypertension.  Patient will need extensive further evaluation and rehab given his severe stroke.

## 2018-07-17 NOTE — ASSESSMENT & PLAN NOTE
Patient's FSGs are controlled on current hypoglycemics.   Last A1c reviewed-   Lab Results   Component Value Date    HGBA1C 7.2 (H) 07/15/2018     Most recent fingerstick glucose reviewed- No results for input(s): POCTGLUCOSE in the last 24 hours.  Current correctional scale  Low  Maintain anti-hyperglycemic dose as follows-   Antihyperglycemics     Start     Stop Route Frequency Ordered    07/17/18 1744  insulin aspart U-100 pen 0-5 Units      -- SubQ As needed (PRN) 07/17/18 3664

## 2018-07-17 NOTE — PLAN OF CARE
Attempted to meet with pt to complete his assessment.  Pt was sleeping.   CM will follow up later.      07/17/18 1462   Discharge Assessment   Assessment Type Discharge Planning Assessment

## 2018-07-17 NOTE — PT/OT/SLP PROGRESS
Physical Therapy Treatment    Patient Name:  eKl Lock   MRN:  [id number]    Recommendations:     Discharge Recommendations:      Discharge Equipment Recommendations:     Barriers to discharge: None    Assessment:     Kel Lock is a 67 y.o. male admitted with a medical diagnosis of Cerebrovascular accident (CVA) due to bilateral thrombosis of carotid arteries.  He presents with the following impairments/functional limitations:    strength/mobility deficits.    Rehab Prognosis:  fair; patient would benefit from acute skilled PT services to address these deficits and reach maximum level of function.      Recent Surgery: * No surgery found *      Plan:     During this hospitalization, patient to be seen 6 x/week to address the above listed problems via gait training, therapeutic activities, therapeutic exercises, neuromuscular re-education  · Plan of Care Expires:  08/10/18   Plan of Care Reviewed with: patient    Subjective     Communicated with RN prior to session.  Patient found supine upon PT entry to room, agreeable to treatment.      Chief Complaint: n/a  Patient comments/goals: n/a  Pain/Comfort:  · Pain Rating 1: 0/10    Patients cultural, spiritual, Scientology conflicts given the current situation:      Objective:     Patient found with: blood pressure cuff, nair catheter, oxygen, peripheral IV, restraints, telemetry     General Precautions: Standard, contact   Orthopedic Precautions:N/A   Braces: N/A     Functional Mobility:  · n/a      AM-PAC 6 CLICK MOBILITY  Turning over in bed (including adjusting bedclothes, sheets and blankets)?: 1  Sitting down on and standing up from a chair with arms (e.g., wheelchair, bedside commode, etc.): 1  Moving from lying on back to sitting on the side of the bed?: 1  Moving to and from a bed to a chair (including a wheelchair)?: 1  Need to walk in hospital room?: 1  Climbing 3-5 steps with a railing?: 1  Basic Mobility Total Score: 6       Therapeutic Activities and  Exercises:   x 15 supine brooks. LE there ex = HS, hip ABD/ADD, AP, SLR    Patient left supine with all lines intact..    GOALS:    Physical Therapy Goals        Problem: Physical Therapy Goal    Goal Priority Disciplines Outcome Goal Variances Interventions   Physical Therapy Goal     PT/OT, PT Ongoing (interventions implemented as appropriate)     Description:  Goals to be met by: 08/10/2018     Patient will increase functional independence with mobility by performin. Pt will be able to tolerate Supine.  2. Pt will be able to tolerate Sit to supine  3. Pt will be able to tolerate Sit to stand transfer   4. Pt will be able to tolerate Bed to chair transfer with most appropriate device.  5. Pt will be able to tolerate Gait  Activity using most appropriate device.                       Time Tracking:     PT Received On: 18  PT Start Time: 905     PT Stop Time: 920  PT Total Time (min): 15 min     Billable Minutes: Therapeutic Exercise 15    Treatment Type: Treatment  PT/PTA: PT     PTA Visit Number: 0     Micha Velasquez, PT  2018

## 2018-07-17 NOTE — PROGRESS NOTES
[facility] Hospital Medicine  Progress Note    Patient Name: Kel Lock  MRN: [id number]  Patient Class: IP- Inpatient   Admission Date: 7/15/2018  Length of Stay: 2 days  Attending Physician: Carrington Garcia MD  Primary Care Provider: [facility]        Subjective:     Principal Problem:Cerebrovascular accident (CVA) due to bilateral thrombosis of carotid arteries    HPI:  Mr. Lock is a 67 YOCM with PMH of HTN, DM, HLD, who was last seen by his ex-wife 3 days ago, normal , and apparently was found lying on floor unable to move himself back to the bed after he had a fall, heard by his roommate. The above statement was obtained from the medical record. Family at bed sided include his daughter, son in-law, ex wife, who have not witness any change in his clinical status.   He was presented to the [place] ER with right sided weakness, slurred speech and altered mental status. Telestroke was consulted who calculated his NIH value at 16. Initial CT head showed areas of moderate involutional changes consistent with microvascular ischemic changes with encephalomalacia watershed area of distribution, and subacute infarcts at right and left parieto-occipital cortex.   Pt was communicated with neurology, apparently who recommended MRI of the brain without contrast.   At the time of my interview with the patient, he denied CP, SOB or HA, problem with vision, but he clearly has left preference and right hemineglect. The pt is asking for water since he has touched the floor.       Hospital Course:  No notes on file    Interval History: Patient seen and examined.  Overnight, patient's blood pressure continued to spike in the 190s to 100 systolic and patient continued to be agitated requiring restraints to be placed on his mobile side.  Nasogastric tube was attempted insertion yesterday without success secondary to patient agitation.  This morning, patient remains calm and at his mental  baseline.  He is still profoundly aphasic and hemiplegic.    Review of Systems   Unable to perform ROS: Mental status change     Objective:     Vital Signs (Most Recent):  Temp: 98.4 °F (36.9 °C) (07/17/18 1530)  Pulse: 110 (07/17/18 1600)  Resp: 20 (07/17/18 1600)  BP: (!) 192/89 (07/17/18 1600)  SpO2: 99 % (07/17/18 1600) Vital Signs (24h Range):  Temp:  [97.6 °F (36.4 °C)-98.9 °F (37.2 °C)] 98.4 °F (36.9 °C)  Pulse:  [] 110  Resp:  [12-33] 20  SpO2:  [90 %-99 %] 99 %  BP: (105-220)/(61-98) 192/89     Weight: 84.3 kg (185 lb 13.6 oz)  Body mass index is 28.26 kg/m².    Intake/Output Summary (Last 24 hours) at 07/17/18 1636  Last data filed at 07/17/18 1522   Gross per 24 hour   Intake           2887.5 ml   Output             1945 ml   Net            942.5 ml      Physical Exam   General exam-elderly  male who is lethargic and altered.     HEENT-pupils sluggish but reactive.  Extraocular movements appear intact.  Oropharynx is clear and dry  Neck exam-no JVD or thyromegaly  Cardiovascular-regular rate rhythm no murmurs gallops rubs  Respiratory-bilateral sonorous breath sounds noted.  No increased work of breathing noted.  Abdomen-soft, nontender nondistended.  Bowel sounds are normoactive.  Extremities-no cyanosis, clubbing, edema.  Noted mild mottling on the left lower extremity.  Pulses are 2+ and symmetric.  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  Right emelia-neglect also noted. Patient gait was not assessed secondary to hemiplegia.    Significant Labs: All pertinent labs within the past 24 hours have been reviewed.    Significant Imaging: I have reviewed all pertinent imaging results/findings within the past 24 hours.    Assessment/Plan:      * Cerebrovascular accident (CVA) due to bilateral thrombosis of carotid arteries    Patient with large bilateral CVA.  Per depart seems to rest mostly on the left side.  Etiology related to both either  hypertensive/embolic given bilateral nature of the stroke.  Echocardiogram pending.  Patient unable to tolerate anything by mouth.  Will re-attempt insertion of nasogastric tube to deliver oral medications and enteral feedings.  Continue speech, PT, OT.  NIH SS score 16 per my assessment.  Will follow up with Neurology recommendations, avoid anticoagulation per their recommendations, and attempt to continue anti-platelet therapy as well as statin therapy.  Continue permissive hypertension.  Patient will need extensive further evaluation and rehab given his severe stroke.        Malnutrition of moderate degree    NGT inserted today. Start with bolus nutrition @ 200cc q4 and F/U with nutrition reccs. May need PEG if unable to swallow.          GRACIE (acute kidney injury)     Patient with GRACIE likely d/t IVVD  Which is currently improving. Labs reviewed- BMP with Estimated Creatinine Clearance: 67.1 mL/min (based on SCr of 1.1 mg/dL). according to latest data. Monitor UOP and serial BMP and adjust therapy as needed. Renally dose meds for GFR listed above.            HLD (hyperlipidemia)     Patient is chronically on statin. Will continue for now. Monitor clinically. Last LDL was   Lab Results   Component Value Date    LDLCALC 63.8 07/15/2018                Essential hypertension    Currently stable and observing permissive HTN, as per Neurology evaluation. Ok for BP<220/110.          Type 2 diabetes mellitus    Patient's FSGs are controlled on current hypoglycemics.   Last A1c reviewed-   Lab Results   Component Value Date    HGBA1C 7.2 (H) 07/15/2018     Most recent fingerstick glucose reviewed- No results for input(s): POCTGLUCOSE in the last 24 hours.  Current correctional scale  Low  Maintain anti-hyperglycemic dose as follows-   Antihyperglycemics     Start     Stop Route Frequency Ordered    07/17/18 1744  insulin aspart U-100 pen 0-5 Units      -- SubQ As needed (PRN) 07/17/18 1644                  VTE Risk  Mitigation         Ordered     enoxaparin injection 40 mg  Daily      07/17/18 0938     Place MONAE hose  Until discontinued      07/15/18 1152     IP VTE HIGH RISK PATIENT  Once      07/15/18 1152          Critical care time spent on the evaluation and treatment of severe organ dysfunction, review of pertinent labs and imaging studies, discussions with consulting providers and discussions with patient/family: 42 minutes.    Carrington Garcia MD  Department of Hospital Medicine   [facility]

## 2018-07-17 NOTE — NURSING
Pt managed to pull NG tube even while in restraints.  NG reinserted with the help of 4 nurses, auscultated for placement and ordered xray to verify.  Restraints secured and daughter now at bedside.

## 2018-07-17 NOTE — PLAN OF CARE
Attempted to speak to pt's daughter Darshana, [phone] to complete pt's assessment.  Was informed that pt's cell # was [phone] and pt was possibly at the hospital.  Called the number and left a message for Darshana to call me.      07/17/18 3427   Discharge Assessment   Assessment Type Discharge Planning Assessment

## 2018-07-17 NOTE — ASSESSMENT & PLAN NOTE
NGT inserted today. Start with bolus nutrition @ 200cc q4 and F/U with nutrition reccs. May need PEG if unable to swallow.

## 2018-07-17 NOTE — PLAN OF CARE
07/16/18 1928   Patient Assessment/Suction   Level of Consciousness (AVPU) responds to voice   All Lung Fields Breath Sounds diminished;coarse   Rhythm/Pattern, Respiratory (dpes have short period of apnea)   Cough Frequency infrequent   PRE-TX-O2-ETCO2   O2 Device (Oxygen Therapy) nasal cannula   $ Is the patient on Low Flow Oxygen? Yes   Flow (L/min) 2   SpO2 96 %   Pulse Oximetry Type Continuous   $ Pulse Oximetry - Multiple Charge Pulse Oximetry - Multiple   Pulse 93   Resp 19

## 2018-07-17 NOTE — ASSESSMENT & PLAN NOTE
Patient with GRACIE likely d/t IVVD  Which is currently improving. Labs reviewed- BMP with Estimated Creatinine Clearance: 67.1 mL/min (based on SCr of 1.1 mg/dL). according to latest data. Monitor UOP and serial BMP and adjust therapy as needed. Renally dose meds for GFR listed above.

## 2018-07-17 NOTE — ASSESSMENT & PLAN NOTE
I have personally reviewed the MRI brain. Much of the stroke area is watershed between MCA/KAIT/PCA, however, there is also significant ischemic stroke of the left MCA territory. The patient is at high risk for hemorrhagic transformation, swelling, and complications. Will continue to monitor. I have a low threshold to obtain STAT CT head for any neurologic changes, headache, nausea/vomiting, unresponsiveness.     - Cardiac monitoring  - Permissive HTN up to 220/110, very gradual decrease, avoid hypotension  - Glucose control  - LDL is 63, continue statin  - Carotid US showed carotid occlusion, MRA neck confirmed complete occlusion of the left ICA. Nondiagnostic of the vertebrals due to motion but apparent occlusion of the left vertebral. We cannot obtain CTA neck due to poor renal function.   - Echocardiogram pending  - PMR consult/PT/OT/Speech to evaluate and treat  - Social work consult for discharge planning  - Aspirin 81 mg daily and agree with adding plavix 75 mg daily, watching closely for hemorrhagic transformation. Ok for prophylactic but no therapeutic anticoagulation.    Discussed with daughter to be thinking about and talking to patient about his wishes for PEG should be not be able to swallow in the coming days. Counseled her on the unclear level of recovery and that he likely will not be independent any longer.

## 2018-07-17 NOTE — PROGRESS NOTES
[facility]  Neurology  Progress Note    Patient Name: Kel Lock  MRN: [id number]  Admission Date: 7/15/2018  Hospital Length of Stay: 2 days  Code Status: Full Code   Attending Provider: Carrington Garcia MD  Primary Care Physician: [facility] Asif   Principal Problem:Cerebrovascular accident (CVA) due to bilateral thrombosis of carotid arteries      Subjective:     Interval History: NG tube placed, stable but needing sedation to keep NG tube in, agitated    Current Facility-Administered Medications   Medication Dose Route Frequency Provider Last Rate Last Dose    0.9%  NaCl infusion   Intravenous Continuous Naseem Rutledge  mL/hr at 07/17/18 1348      aspirin suppository 300 mg  300 mg Rectal Daily Naseem Rutledge MD   Stopped at 07/17/18 0900    atorvastatin tablet 40 mg  40 mg Oral QHS Naseem Rutledge MD        busPIRone tablet 5 mg  5 mg Oral Daily Naseem Rutledge MD   Stopped at 07/16/18 0900    clopidogrel tablet 75 mg  75 mg Oral Daily Naseem Rutledge MD   75 mg at 07/17/18 1315    enoxaparin injection 40 mg  40 mg Subcutaneous Daily Carrington Garcia MD        lorazepam injection 1 mg  1 mg Intravenous Q10 Min PRN Carrington Garcia MD   1 mg at 07/17/18 1030    metoprolol injection 5 mg  5 mg Intravenous Q5 Min PRN Naseem Rutledge MD        metoprolol injection 5 mg  5 mg Intravenous Q12H Naseem Rutledge MD   Stopped at 07/17/18 0900    paroxetine tablet 40 mg  40 mg Oral QAM Naseem Rutledge MD   40 mg at 07/17/18 1316    pneumoc 13-sherif conj-dip cr(PF) 0.5 mL  0.5 mL Intramuscular vaccine x 1 dose Naseem Rutledge MD        polyethylene glycol packet 17 g  17 g Oral Daily Naseem Rutledge MD   17 g at 07/17/18 1316    sodium chloride 0.9% flush 3 mL  3 mL Intravenous Q8H Naseem Rutledge MD   3 mL at 07/17/18 1455    sulfur hexafluoride microspheres injection 2.4 mL  2.4 mL Intravenous ONCE PRN Jf Tapia MD        traZODone tablet 150 mg  150 mg Oral QHS Naseem Rutledge MD           Review  of Systems   Unable to perform ROS: Mental status change     Objective:     Vital Signs (Most Recent):  Temp: 97.6 °F (36.4 °C) (07/17/18 1200)  Pulse: 109 (07/17/18 1445)  Resp: 20 (07/17/18 1445)  BP: (!) 198/95 (07/17/18 1430)  SpO2: 99 % (07/17/18 1445) Vital Signs (24h Range):  Temp:  [97.6 °F (36.4 °C)-98.9 °F (37.2 °C)] 97.6 °F (36.4 °C)  Pulse:  [] 109  Resp:  [12-33] 20  SpO2:  [90 %-99 %] 99 %  BP: (105-220)/(61-98) 198/95     Weight: 84.3 kg (185 lb 13.6 oz)  Body mass index is 28.26 kg/m².    Physical Exam     Sleeping, apneic breathing, awakes to voice, responds, PERRL, right hemiplegia    Significant Labs: All pertinent lab results from the past 24 hours have been reviewed.    Significant Imaging: I have reviewed and interpreted all pertinent imaging results/findings within the past 24 hours.    Assessment and Plan:     * Cerebrovascular accident (CVA) due to bilateral thrombosis of carotid arteries    I have personally reviewed the MRI brain. Much of the stroke area is watershed between MCA/KAIT/PCA, however, there is also significant ischemic stroke of the left MCA territory. The patient is at high risk for hemorrhagic transformation, swelling, and complications. Will continue to monitor. I have a low threshold to obtain STAT CT head for any neurologic changes, headache, nausea/vomiting, unresponsiveness.     - Cardiac monitoring  - Permissive HTN up to 220/110, very gradual decrease, avoid hypotension  - Glucose control  - LDL is 63, continue statin  - Carotid US showed carotid occlusion, MRA neck confirmed complete occlusion of the left ICA. Nondiagnostic of the vertebrals due to motion but apparent occlusion of the left vertebral. We cannot obtain CTA neck due to poor renal function.   - Echocardiogram pending  - PMR consult/PT/OT/Speech to evaluate and treat  - Social work consult for discharge planning  - Aspirin 81 mg daily and agree with adding plavix 75 mg daily, watching closely for  hemorrhagic transformation. Ok for prophylactic but no therapeutic anticoagulation.    Discussed with daughter to be thinking about and talking to patient about his wishes for PEG should be not be able to swallow in the coming days. Counseled her on the unclear level of recovery and that he likely will not be independent any longer.         GRACIE (acute kidney injury)    IVF hydration, monitoring closely        Leucocytosis    Workup per primary team, risk for aspiration        HLD (hyperlipidemia)     continue home statin        Essential hypertension     Gradual lowering, avoid hypotension        Type 2 diabetes mellitus     Glucose control per primary team            VTE Risk Mitigation         Ordered     enoxaparin injection 40 mg  Daily      07/17/18 0938     Place MONAE hose  Until discontinued      07/15/18 1152     IP VTE HIGH RISK PATIENT  Once      07/15/18 1152          Soco Laureano MD  Neurology  [facility]

## 2018-07-18 PROBLEM — J96.02 ACUTE HYPERCAPNIC RESPIRATORY FAILURE: Status: ACTIVE | Noted: 2018-07-18

## 2018-07-18 PROBLEM — G93.41 ENCEPHALOPATHY, METABOLIC: Status: ACTIVE | Noted: 2018-07-18

## 2018-07-18 LAB
ALBUMIN SERPL BCP-MCNC: 2.8 G/DL
ALLENS TEST: ABNORMAL
ALLENS TEST: ABNORMAL
ALP SERPL-CCNC: 71 U/L
ALT SERPL W/O P-5'-P-CCNC: 39 U/L
ANION GAP SERPL CALC-SCNC: 8 MMOL/L
AORTIC ROOT ANNULUS: 3.67 CM
AORTIC VALVE CUSP SEPERATION: 2.35 CM
APTT BLDCRRT: 26.6 SEC
AST SERPL-CCNC: 37 U/L
AV MEAN GRADIENT: 4.26 MMHG
AV PEAK GRADIENT: 6.78 MMHG
AV VALVE AREA: 3.74 CM2
BASOPHILS # BLD AUTO: 0 K/UL
BASOPHILS NFR BLD: 0.3 %
BILIRUB SERPL-MCNC: 0.4 MG/DL
BSA FOR ECHO PROCEDURE: 1.98 M2
BUN SERPL-MCNC: 17 MG/DL
CALCIUM SERPL-MCNC: 8.6 MG/DL
CHLORIDE SERPL-SCNC: 120 MMOL/L
CK MB SERPL-MCNC: 5.3 NG/ML
CK MB SERPL-RTO: 0.7 %
CK SERPL-CCNC: 815 U/L
CO2 SERPL-SCNC: 21 MMOL/L
CREAT SERPL-MCNC: 1 MG/DL
CV ECHO LV RWT: 1.12 CM
DELSYS: ABNORMAL
DELSYS: ABNORMAL
DIFFERENTIAL METHOD: ABNORMAL
DOP CALC AO PEAK VEL: 1.3 M/S
DOP CALC AO VTI: 21.07 CM
DOP CALC LVOT AREA: 3.97 CM2
DOP CALC LVOT DIAMETER: 2.25 CM
DOP CALC LVOT STROKE VOLUME: 78.81 CM3
DOP CALCLVOT PEAK VEL VTI: 19.83 CM
E WAVE DECELERATION TIME: 290.67 MSEC
E/A RATIO: 0.81
E/E' RATIO: 11.88
ECHO LV POSTERIOR WALL: 1.02 CM (ref 0.6–1.1)
EOSINOPHIL # BLD AUTO: 0.1 K/UL
EOSINOPHIL NFR BLD: 1.9 %
EP: 5
ERYTHROCYTE [DISTWIDTH] IN BLOOD BY AUTOMATED COUNT: 14.5 %
ERYTHROCYTE [SEDIMENTATION RATE] IN BLOOD BY WESTERGREN METHOD: 20 MM/H
EST. GFR  (AFRICAN AMERICAN): >60 ML/MIN/1.73 M^2
EST. GFR  (NON AFRICAN AMERICAN): >60 ML/MIN/1.73 M^2
FIO2: 35
FIO2: 36
FLOW: 4
FRACTIONAL SHORTENING: 38 % (ref 28–44)
GLUCOSE SERPL-MCNC: 144 MG/DL
HCO3 UR-SCNC: 24.4 MMOL/L (ref 24–28)
HCO3 UR-SCNC: 24.5 MMOL/L (ref 24–28)
HCT VFR BLD AUTO: 41.1 %
HGB BLD-MCNC: 13.3 G/DL
INR PPP: 1.1
INTERVENTRICULAR SEPTUM: 1.76 CM (ref 0.6–1.1)
IP: 18
IVRT: 0.08 MSEC
LEFT ATRIUM SIZE: 3.54 CM
LEFT INTERNAL DIMENSION IN SYSTOLE: 1.55 CM (ref 2.1–4)
LEFT VENTRICLE MASS INDEX: 55 G/M2
LEFT VENTRICULAR INTERNAL DIMENSION IN DIASTOLE: 2.48 CM (ref 3.5–6)
LEFT VENTRICULAR MASS: 108.99 G
LV LATERAL E/E' RATIO: 9.18
LV SEPTAL E/E' RATIO: 16.83
LYMPHOCYTES # BLD AUTO: 1.2 K/UL
LYMPHOCYTES NFR BLD: 17.1 %
MAGNESIUM SERPL-MCNC: 2 MG/DL
MCH RBC QN AUTO: 29.7 PG
MCHC RBC AUTO-ENTMCNC: 32.3 G/DL
MCV RBC AUTO: 92 FL
MIN VOL: 18
MODE: ABNORMAL
MODE: ABNORMAL
MONOCYTES # BLD AUTO: 0.6 K/UL
MONOCYTES NFR BLD: 8.7 %
MV PEAK A VEL: 1.24 M/S
MV PEAK E VEL: 1.01 M/S
MV STENOSIS PRESSURE HALF TIME: 84.3 MS
MV VALVE AREA P 1/2 METHOD: 2.61 CM2
NEUTROPHILS # BLD AUTO: 5.2 K/UL
NEUTROPHILS NFR BLD: 72 %
PCO2 BLDA: 46.7 MMHG (ref 35–45)
PCO2 BLDA: 51.3 MMHG (ref 35–45)
PH SMN: 7.29 [PH] (ref 7.35–7.45)
PH SMN: 7.33 [PH] (ref 7.35–7.45)
PHOSPHATE SERPL-MCNC: 3.2 MG/DL
PLATELET # BLD AUTO: 113 K/UL
PMV BLD AUTO: 9.7 FL
PO2 BLDA: 104 MMHG (ref 80–100)
PO2 BLDA: 80 MMHG (ref 80–100)
POC BE: -2 MMOL/L
POC BE: -2 MMOL/L
POC SATURATED O2: 94 % (ref 95–100)
POC SATURATED O2: 97 % (ref 95–100)
POC TCO2: 26 MMOL/L (ref 23–27)
POC TCO2: 26 MMOL/L (ref 23–27)
POCT GLUCOSE: 127 MG/DL (ref 70–110)
POCT GLUCOSE: 164 MG/DL (ref 70–110)
POTASSIUM SERPL-SCNC: 4 MMOL/L
PROT SERPL-MCNC: 6 G/DL
PROTHROMBIN TIME: 10.7 SEC
PULM VEIN A" WAVE DURATION": 97 MSEC
PV PEAK GRADIENT: 6.52 MMHG
PV PEAK VELOCITY: 1.28 CM/S
RA PRESSURE: 8 MMHG
RBC # BLD AUTO: 4.46 M/UL
RIGHT VENTRICULAR END-DIASTOLIC DIMENSION: 3.16 CM
SAMPLE: ABNORMAL
SAMPLE: ABNORMAL
SITE: ABNORMAL
SITE: ABNORMAL
SODIUM SERPL-SCNC: 149 MMOL/L
TDI LATERAL: 0.11
TDI SEPTAL: 0.06
TDI: 0.09
TROPONIN I SERPL DL<=0.01 NG/ML-MCNC: 0.03 NG/ML
WBC # BLD AUTO: 7.3 K/UL

## 2018-07-18 PROCEDURE — 85025 COMPLETE CBC W/AUTO DIFF WBC: CPT

## 2018-07-18 PROCEDURE — 27000190 HC CPAP FULL FACE MASK W/VALVE

## 2018-07-18 PROCEDURE — 36600 WITHDRAWAL OF ARTERIAL BLOOD: CPT

## 2018-07-18 PROCEDURE — 94761 N-INVAS EAR/PLS OXIMETRY MLT: CPT

## 2018-07-18 PROCEDURE — 83735 ASSAY OF MAGNESIUM: CPT

## 2018-07-18 PROCEDURE — 99233 SBSQ HOSP IP/OBS HIGH 50: CPT | Mod: ,,, | Performed by: PSYCHIATRY & NEUROLOGY

## 2018-07-18 PROCEDURE — 99900035 HC TECH TIME PER 15 MIN (STAT)

## 2018-07-18 PROCEDURE — 25000003 PHARM REV CODE 250: Performed by: INTERNAL MEDICINE

## 2018-07-18 PROCEDURE — 36415 COLL VENOUS BLD VENIPUNCTURE: CPT

## 2018-07-18 PROCEDURE — 85730 THROMBOPLASTIN TIME PARTIAL: CPT

## 2018-07-18 PROCEDURE — 84100 ASSAY OF PHOSPHORUS: CPT

## 2018-07-18 PROCEDURE — 82553 CREATINE MB FRACTION: CPT

## 2018-07-18 PROCEDURE — 97803 MED NUTRITION INDIV SUBSEQ: CPT

## 2018-07-18 PROCEDURE — 84484 ASSAY OF TROPONIN QUANT: CPT

## 2018-07-18 PROCEDURE — 63600175 PHARM REV CODE 636 W HCPCS: Performed by: HOSPITALIST

## 2018-07-18 PROCEDURE — 25000003 PHARM REV CODE 250: Performed by: HOSPITALIST

## 2018-07-18 PROCEDURE — A4216 STERILE WATER/SALINE, 10 ML: HCPCS | Performed by: INTERNAL MEDICINE

## 2018-07-18 PROCEDURE — 85610 PROTHROMBIN TIME: CPT

## 2018-07-18 PROCEDURE — 82550 ASSAY OF CK (CPK): CPT

## 2018-07-18 PROCEDURE — 82803 BLOOD GASES ANY COMBINATION: CPT

## 2018-07-18 PROCEDURE — 94660 CPAP INITIATION&MGMT: CPT

## 2018-07-18 PROCEDURE — 27000221 HC OXYGEN, UP TO 24 HOURS

## 2018-07-18 PROCEDURE — 11000001 HC ACUTE MED/SURG PRIVATE ROOM

## 2018-07-18 PROCEDURE — 80053 COMPREHEN METABOLIC PANEL: CPT

## 2018-07-18 RX ADMIN — ATORVASTATIN CALCIUM 40 MG: 40 TABLET, FILM COATED ORAL at 10:07

## 2018-07-18 RX ADMIN — POLYETHYLENE GLYCOL (3350) 17 G: 17 POWDER, FOR SOLUTION ORAL at 10:07

## 2018-07-18 RX ADMIN — TRAZODONE HYDROCHLORIDE 150 MG: 50 TABLET ORAL at 10:07

## 2018-07-18 RX ADMIN — METOPROLOL SUCCINATE 50 MG: 50 TABLET, EXTENDED RELEASE ORAL at 10:07

## 2018-07-18 RX ADMIN — PAROXETINE HYDROCHLORIDE HEMIHYDRATE 40 MG: 20 TABLET, FILM COATED ORAL at 10:07

## 2018-07-18 RX ADMIN — BUSPIRONE HYDROCHLORIDE 5 MG: 5 TABLET ORAL at 10:07

## 2018-07-18 RX ADMIN — ASPIRIN 300 MG: 300 SUPPOSITORY RECTAL at 10:07

## 2018-07-18 RX ADMIN — SODIUM CHLORIDE, PRESERVATIVE FREE 3 ML: 5 INJECTION INTRAVENOUS at 10:07

## 2018-07-18 RX ADMIN — CLOPIDOGREL BISULFATE 75 MG: 75 TABLET ORAL at 10:07

## 2018-07-18 RX ADMIN — ENOXAPARIN SODIUM 40 MG: 100 INJECTION SUBCUTANEOUS at 05:07

## 2018-07-18 NOTE — PLAN OF CARE
Problem: Nutrition, Enteral (Adult)  Intervention: Monitor/Manage Nutrition Support  Recommendations    1) Enteral:    Bolus: Diabetasource AC: 6 1/2 cartons per day, from 6 am to 10 pm.  Progress to:  6 am, 2 cartons;  10 am, 1 1/2 cartons;  2 pm, 1 carton;  6 pm, 1 carton:  10 pm, 1 carton. Flush with 100 mls water with each feeding or per MD rec.  At goal provides: 1950 calories, 98 gms protein,  163 gms CHO,  1329 mls free water. (Flushes provide an additional 600 mls water)    Continuous Diabetasource AC @ 20 mls/hr.  Progress 10 mls/hr Q 6 hrs to goal 70 mls/hr or per pt tolerance. Flush with 30 mls water Q 4 hrs or per MD. Hold if residual >250 mls.  At goal provides 2016 calories, 101 gms protein, 168 gms CHO, 1374 mls free water.    2) When medically appropriate: ADAT to diabetic 2000 calorie, cardiac diet with texture per SLP.     Goals: 1) Pt will receive nutrition within 72 hrs. 2) Pt will progress to =>85% EEN within 48 hrs.   Nutrition Goal Status: 1)met 2) new  Communication of RD Recs:  (POC, reviewed with MD and RN)

## 2018-07-18 NOTE — PLAN OF CARE
Problem: Patient Care Overview  Goal: Plan of Care Review  Outcome: Ongoing (interventions implemented as appropriate)  POC discussed with patient/family at bedside, daughter voiced understanding. Patient slept well between patient care. Patient awakens to verbal stimuli, answers simple questions appropriately, some expressive aphasia noted. Follows simple commands, good hand  L side, R side flaccid. Receiving IV fluids, otherwise NPO. Bolus TF Diabetisource 200cc q 4/h, tolerating well to NGT, clamped otherwise. Adequate urine noted in nair. 02/2L/NC in use, VS stable. Turned as needed with pillows, tolerated well. Continue with L wrist restraint due to patient continued attempt to pull at lines. Monitoring on Telemetry. Monitoring accuchecks with coverage as needed. HOB elevated. No distress noted.

## 2018-07-18 NOTE — ASSESSMENT & PLAN NOTE
I have personally reviewed the MRI brain. Much of the stroke area is watershed between MCA/KAIT/PCA, however, there is also significant ischemic stroke of the left MCA territory. The patient is at high risk for hemorrhagic transformation, swelling, and complications. Will continue to monitor. I have a low threshold to obtain STAT CT head for any neurologic changes, headache, nausea/vomiting, unresponsiveness.     - Cardiac monitoring  - Permissive HTN up to 220/110, very gradual decrease, avoid hypotension  - Glucose control  - LDL is 63, continue statin  - Carotid US showed carotid occlusion, MRA neck confirmed complete occlusion of the left ICA. Nondiagnostic of the vertebrals due to motion but apparent occlusion of the left vertebral. We cannot obtain CTA neck due to poor renal function.   - Echocardiogram pending  - PMR consult/PT/OT/Speech to evaluate and treat  - Social work consult for discharge planning  - Aspirin 81 mg daily and agree with adding plavix 75 mg daily, watching closely for hemorrhagic transformation. Ok for prophylactic but no therapeutic anticoagulation.    Discussed with ex-wife to be thinking about and talking to patient about his wishes for PEG should be not be able to swallow in the coming days. She thinks he would want it. Will defer decision to daughter.

## 2018-07-18 NOTE — SUBJECTIVE & OBJECTIVE
Subjective:     Interval History: no change, ex-wife at bedside and notes that he has been less responsive today, he was able to feel her touch his right side yesterday which was an improvement    Current Facility-Administered Medications   Medication Dose Route Frequency Provider Last Rate Last Dose    0.9%  NaCl infusion   Intravenous Continuous Naseem Rutledge  mL/hr at 07/17/18 1348      aspirin suppository 300 mg  300 mg Rectal Daily Naseem Rutledge MD   300 mg at 07/18/18 1027    atorvastatin tablet 40 mg  40 mg Per NG tube QHS Carrington Garcia MD   40 mg at 07/17/18 2204    busPIRone tablet 5 mg  5 mg Per NG tube Daily Carrington Garcia MD   5 mg at 07/18/18 1022    cloNIDine 0.3 mg/24 hr td ptwk 1 patch  1 patch Transdermal Q7 Days Carrington Garcia MD   Stopped at 07/18/18 0900    clopidogrel tablet 75 mg  75 mg Per NG tube Daily Carrington Garcia MD   75 mg at 07/18/18 1022    dextrose 50% injection 12.5 g  12.5 g Intravenous PRN Carrington Garcia MD        enoxaparin injection 40 mg  40 mg Subcutaneous Daily Carrington Garcia MD   40 mg at 07/17/18 1649    glucagon (human recombinant) injection 1 mg  1 mg Intramuscular PRN Carrington Garcia MD        insulin aspart U-100 pen 0-5 Units  0-5 Units Subcutaneous PRN Carrington Garcia MD        lorazepam injection 1 mg  1 mg Intravenous Q10 Min PRN Carrington Garcia MD   1 mg at 07/17/18 1030    metoprolol injection 5 mg  5 mg Intravenous Q5 Min PRN Naseem Rutledge MD        metoprolol succinate (TOPROL-XL) 24 hr tablet 50 mg  50 mg Oral Daily Carrington Garcia MD   50 mg at 07/18/18 1023    paroxetine tablet 40 mg  40 mg Per NG tube QAM Carrington Garcia MD   40 mg at 07/18/18 1022    pneumoc 13-sherif conj-dip cr(PF) 0.5 mL  0.5 mL Intramuscular vaccine x 1 dose Naseem Rutledge MD        polyethylene glycol packet 17 g  17 g Oral Daily Naseem Rutledge MD   17 g at 07/18/18 1018    sodium chloride 0.9% flush 3 mL  3 mL Intravenous Q8H Naseem Rutledge MD   3 mL at 07/17/18 8633     sodium chloride 0.9% flush 3 mL  3 mL Intravenous Q8H Naseem Rutledge MD   3 mL at 07/17/18 2204    sulfur hexafluoride microspheres injection 2.4 mL  2.4 mL Intravenous ONCE PRN Jf Tapia MD        traZODone tablet 150 mg  150 mg Per NG tube QHS Carrington Garcia MD   150 mg at 07/17/18 2203       Review of Systems   Unable to perform ROS: Mental status change     Objective:     Vital Signs (Most Recent):  Temp: 99.8 °F (37.7 °C) (07/18/18 1117)  Pulse: 97 (07/18/18 1305)  Resp: 18 (07/18/18 1117)  BP: 134/67 (07/18/18 1305)  SpO2: (!) 92 % (07/18/18 1117) Vital Signs (24h Range):  Temp:  [96.1 °F (35.6 °C)-99.8 °F (37.7 °C)] 99.8 °F (37.7 °C)  Pulse:  [] 97  Resp:  [15-22] 18  SpO2:  [90 %-99 %] 92 %  BP: (101-212)/(55-96) 134/67     Weight: 82.1 kg (181 lb)  Body mass index is 27.52 kg/m².    Physical Exam     sleeping, awakens to voice, mumbles, moves left side well, no right sided movements    Significant Labs: All pertinent lab results from the past 24 hours have been reviewed.    Significant Imaging: I have reviewed and interpreted all pertinent imaging results/findings within the past 24 hours.

## 2018-07-18 NOTE — ASSESSMENT & PLAN NOTE
Contributing Nutrition Diagnosis  Altered carbohydrate metabolism    Related to (etiology):   Endocrine dysfunction    Signs and Symptoms (as evidenced by):   Diagnosis of DM2  Lab Results   Component Value Date    HGBA1C 7.2 (H) 07/15/2018       Recent Labs  Lab 07/18/18  1207   POCTGLUCOSE 164*       Interventions/Recommendations (treatment strategy):  Provide nutrition support to meet =>85% EEN with no more than 250 gms CHO vs GIR <5    Nutrition Diagnosis Status:   continues

## 2018-07-18 NOTE — PLAN OF CARE
Problem: Patient Care Overview  Goal: Plan of Care Review  Outcome: Ongoing (interventions implemented as appropriate)  Pt has remained free from injury this shift, he has been repositioned for comfort and safety, hob elevated, feedings tolerated well with zero residual prior to each bolus.  Pt did have to go on the bipap and is showing resp improvement with it.  He is nonverbal, only aroused to touch, he has brief moments were he will move his left arm to pull covers etc,  He has not made any purposeful movements today. Restraint on left arm intact with slip knot noted.  NG right nare intact.  Pt accuchecks monitored no treatment needed this shift.  No elevated temps this shift.  Skin intact.  Rothman to gravity with positive output yellow urine noted.  Pt is NPO mouth care provided.  No clonidine patch was noted on this pt today.

## 2018-07-18 NOTE — PLAN OF CARE
Problem: Patient Care Overview  Goal: Plan of Care Review  Pt brought to floor via stretcher from ICU. Disoriented to place and situation. IVF infusing per order. Restraints continued to L upper extremity and L lower extremity due to pt pulling at NG tube. restaints removed and skin assessed q 2hrs. Comfort level established. Pt and daughter Oriented to room, daughter verbalized understanding. Blous feeding ordered for pt, awaiting delivery from dietary. Pt denies pain at this time. Bed locked ad low call light within reach, will cont to monitor.

## 2018-07-18 NOTE — PLAN OF CARE
07/18/18 1800   Labs   $ Was an ABG obtained? Arterial Puncture;ISTAT - Blood gas   $ Labs Tech Time 15 min   Critical Value Communication   Notified Physician/Designee Dr. Garcia

## 2018-07-18 NOTE — CONSULTS
[facility]  Adult Nutrition  Consult Note    SUMMARY     Recommendations    1) Enteral:    Bolus: Diabetasource AC: 6 1/2 cartons per day, from 6 am to 10 pm.  Progress to:  6 am, 2 cartons;  10 am, 1 1/2 cartons;  2 pm, 1 carton;  6 pm, 1 carton:  10 pm, 1 carton. Flush with 100 mls water with each feeding or per MD rec.  At goal provides: 1950 calories, 98 gms protein,  163 gms CHO,  1329 mls free water. (Flushes provide an additional 600 mls water)    Continuous Diabetasource AC @ 20 mls/hr.  Progress 10 mls/hr Q 6 hrs to goal 70 mls/hr or per pt tolerance. Flush with 30 mls water Q 4 hrs or per MD. Hold if residual >250 mls.  At goal provides 2016 calories, 101 gms protein, 168 gms CHO, 1374 mls free water.    2) When medically appropriate: ADAT to diabetic 2000 calorie, cardiac diet with texture per SLP.     Goals: 1) Pt will receive nutrition within 72 hrs. 2) Pt will progress to =>85% EEN within 48 hrs.   Nutrition Goal Status: 1)met 2) new  Communication of RD Recs:  (POC, reviewed with MD and RN)    Reason for Assessment    Reason for Assessment: consult for bolus feed recs  1. Thrombotic stroke    2. Stroke    3. Cerebrovascular accident (CVA) due to bilateral thrombosis of carotid arteries      Past Medical History:   Diagnosis Date    COPD (chronic obstructive pulmonary disease)     Depression     Diabetes mellitus     Hypertension     Stroke      Interdisciplinary Rounds: attended  General Information Comments: Admitted with rt sided weakness, slurred speech, AMS.  Pt not alert. Family/friend not at bedside.  Obtained information from chart and nursing.  NFPE incomplete.   7/18/18: Pt not alert. Family present. NFPE completed. No significant fat or muscle loss noted. Enteral feedings have been started, however, pt pulled the NGT out.  Bolus feeding rec provided.     Nutrition Risk Screen    Nutrition Risk Screen: other (see comments) (c/o stomach and sinus  "problems)    Nutrition/Diet History    Do you have any cultural, spiritual, Jain conflicts, given your current situation?: None  Food Allergies: NKFA    Anthropometrics    Temp: 99.8 °F (37.7 °C)  Height Method: Estimated  Height: 5' 8"  Height (inches): 68 in  Weight Method: Bed Scale  Weight: 82.1 kg (181 lb)  Weight (lb): 181 lb  Ideal Body Weight (IBW), Male: 154 lb  % Ideal Body Weight, Male (lb): 117.53 lb  BMI (Calculated): 27.6  BMI Grade: 25 - 29.9 - overweight  Usual Body Weight (UBW), k.6 kg (per chart review 18)  % Usual Body Weight: 100.82  % Weight Change From Usual Weight: 0.61 %       Lab/Procedures/Meds    Pertinent Labs Reviewed: reviewed  Lab Results   Component Value Date    ALBUMIN 2.8 (L) 2018     Lab Results   Component Value Date    WBC 7.30 2018     BMP  Lab Results   Component Value Date     (H) 2018    K 4.0 2018     (H) 2018    CO2 21 (L) 2018    BUN 17 2018    CREATININE 1.0 2018    CALCIUM 8.6 (L) 2018    ANIONGAP 8 2018    ESTGFRAFRICA >60 2018    EGFRNONAA >60 2018     Lab Results   Component Value Date    CALCIUM 8.6 (L) 2018    PHOS 3.2 2018     Pertinent Medications Reviewed: reviewed  Scheduled Meds:   aspirin  300 mg Rectal Daily    atorvastatin  40 mg Per NG tube QHS    busPIRone  5 mg Per NG tube Daily    cloNIDine 0.3 mg/24 hr td ptwk  1 patch Transdermal Q7 Days    clopidogrel  75 mg Per NG tube Daily    enoxaparin  40 mg Subcutaneous Daily    metoprolol succinate  50 mg Oral Daily    paroxetine  40 mg Per NG tube QAM    polyethylene glycol  17 g Oral Daily    sodium chloride 0.9%  3 mL Intravenous Q8H    sodium chloride 0.9%  3 mL Intravenous Q8H    traZODone  150 mg Per NG tube QHS     Continuous Infusions:      Physical Findings/Assessment    Overall Physical Appearance:  (appears larger than BMI suggests)  Oral/Mouth Cavity: tooth/teeth missing  Skin: "  (James score 13)    Estimated/Assessed Needs    Weight Used For Calorie Calculations: 82.1 kg (181 lb) (Actual weight is within range of NHANES SBW)  25-35 kcal/[id number]        Weight Used For Protein Calculations: 82.1 kg (181 lb)   0.8-1.2 gm/kg/day: 66-99     Fluid Need Method:  (per primary team or UOP + 500 mls/day)     CHO Requirement: 45-50% EEN or <5 GIR      Nutrition Prescription Ordered    Current Diet Order: NPO  Nutrition Order Comments: per SLP rec    Evaluation of Received Nutrient/Fluid Intake    IV Fluid (mL): 150 (mls/hr)  Energy Calories Required: not meeting needs  Protein Required: not meeting needs  Fluid Required:  (per primary team)  % Intake of Estimated Energy Needs: 0 - 25 %  % Meal Intake: NPO    Nutrition Risk    Level of Risk/Frequency of Follow-up:  (2 x wkly)     Assessment and Plan    Inadequate energy intake r/t inability to consume sufficient energy as evidenced by NPO with no alternative nutrition      Monitor and Evaluation    Food and Nutrient Intake: energy intake  Food and Nutrient Adminstration: diet order  Anthropometric Measurements: weight, weight change  Biochemical Data, Medical Tests and Procedures: electrolyte and renal panel, glucose/endocrine profile, inflammatory profile  Nutrition-Focused Physical Findings: overall appearance, skin     Nutrition Follow-Up  2 x wkly    Discharge Plan    To be determined  RD Follow-up?: Yes

## 2018-07-18 NOTE — PLAN OF CARE
Problem: Patient Care Overview  Goal: Plan of Care Review  Outcome: Ongoing (interventions implemented as appropriate)  02 saturation 90% on nc at 2lpm.  Increased to 4lpm nc to maintain 95%.

## 2018-07-18 NOTE — NURSING
MD has rounded, pt is more lethargic than this morning, he seems like he is having a more difficult time breathing, Dr. Garcia has addressed all concerns. I will continue to monitor for changes.

## 2018-07-18 NOTE — PROGRESS NOTES
[facility]  Neurology  Progress Note    Patient Name: Kel Lock  MRN: [id number]  Admission Date: 7/15/2018  Hospital Length of Stay: 3 days  Code Status: Full Code   Attending Provider: Carrington Garcia MD  Primary Care Physician: [facility]   Principal Problem:Cerebrovascular accident (CVA) due to bilateral thrombosis of carotid arteries      Subjective:     Interval History: no change, ex-wife at bedside and notes that he has been less responsive today, he was able to feel her touch his right side yesterday which was an improvement    Current Facility-Administered Medications   Medication Dose Route Frequency Provider Last Rate Last Dose    0.9%  NaCl infusion   Intravenous Continuous Naseem Rutledge  mL/hr at 07/17/18 1348      aspirin suppository 300 mg  300 mg Rectal Daily Naseem Rutledge MD   300 mg at 07/18/18 1027    atorvastatin tablet 40 mg  40 mg Per NG tube QHS Carrington Garcia MD   40 mg at 07/17/18 2204    busPIRone tablet 5 mg  5 mg Per NG tube Daily Carrington Garcia MD   5 mg at 07/18/18 1022    cloNIDine 0.3 mg/24 hr td ptwk 1 patch  1 patch Transdermal Q7 Days Carrington Garcia MD   Stopped at 07/18/18 0900    clopidogrel tablet 75 mg  75 mg Per NG tube Daily Carrington Garcia MD   75 mg at 07/18/18 1022    dextrose 50% injection 12.5 g  12.5 g Intravenous PRN Carrington Garcia MD        enoxaparin injection 40 mg  40 mg Subcutaneous Daily Carrington Garcia MD   40 mg at 07/17/18 1649    glucagon (human recombinant) injection 1 mg  1 mg Intramuscular PRN Carrington Garcia MD        insulin aspart U-100 pen 0-5 Units  0-5 Units Subcutaneous PRN Carrington Garcia MD        lorazepam injection 1 mg  1 mg Intravenous Q10 Min PRN Carrington Garcia MD   1 mg at 07/17/18 1030    metoprolol injection 5 mg  5 mg Intravenous Q5 Min PRN Naseem Rutledge MD        metoprolol succinate (TOPROL-XL) 24 hr tablet 50 mg  50 mg Oral Daily Carrington Garcia MD   50 mg at 07/18/18 1023     paroxetine tablet 40 mg  40 mg Per NG tube QAM Carrington Garcia MD   40 mg at 07/18/18 1022    pneumoc 13-sherif conj-dip cr(PF) 0.5 mL  0.5 mL Intramuscular vaccine x 1 dose Naseem Rutledge MD        polyethylene glycol packet 17 g  17 g Oral Daily Naseem Rutledge MD   17 g at 07/18/18 1018    sodium chloride 0.9% flush 3 mL  3 mL Intravenous Q8H Naseem Rutledge MD   3 mL at 07/17/18 1455    sodium chloride 0.9% flush 3 mL  3 mL Intravenous Q8H Naseem Rutledge MD   3 mL at 07/17/18 2204    sulfur hexafluoride microspheres injection 2.4 mL  2.4 mL Intravenous ONCE PRN Jf Tapia MD        traZODone tablet 150 mg  150 mg Per NG tube QHS Carrington Garcia MD   150 mg at 07/17/18 2203       Review of Systems   Unable to perform ROS: Mental status change     Objective:     Vital Signs (Most Recent):  Temp: 99.8 °F (37.7 °C) (07/18/18 1117)  Pulse: 97 (07/18/18 1305)  Resp: 18 (07/18/18 1117)  BP: 134/67 (07/18/18 1305)  SpO2: (!) 92 % (07/18/18 1117) Vital Signs (24h Range):  Temp:  [96.1 °F (35.6 °C)-99.8 °F (37.7 °C)] 99.8 °F (37.7 °C)  Pulse:  [] 97  Resp:  [15-22] 18  SpO2:  [90 %-99 %] 92 %  BP: (101-212)/(55-96) 134/67     Weight: 82.1 kg (181 lb)  Body mass index is 27.52 kg/m².    Physical Exam     sleeping, awakens to voice, mumbles, moves left side well, no right sided movements    Significant Labs: All pertinent lab results from the past 24 hours have been reviewed.    Significant Imaging: I have reviewed and interpreted all pertinent imaging results/findings within the past 24 hours.    Assessment and Plan:     * Cerebrovascular accident (CVA) due to bilateral thrombosis of carotid arteries    I have personally reviewed the MRI brain. Much of the stroke area is watershed between MCA/KAIT/PCA, however, there is also significant ischemic stroke of the left MCA territory. The patient is at high risk for hemorrhagic transformation, swelling, and complications. Will continue to monitor. I have a low threshold to obtain  STAT CT head for any neurologic changes, headache, nausea/vomiting, unresponsiveness.     - Cardiac monitoring  - Permissive HTN up to 220/110, very gradual decrease, avoid hypotension  - Glucose control  - LDL is 63, continue statin  - Carotid US showed carotid occlusion, MRA neck confirmed complete occlusion of the left ICA. Nondiagnostic of the vertebrals due to motion but apparent occlusion of the left vertebral. We cannot obtain CTA neck due to poor renal function.   - Echocardiogram pending  - PMR consult/PT/OT/Speech to evaluate and treat  - Social work consult for discharge planning  - Aspirin 81 mg daily and agree with adding plavix 75 mg daily, watching closely for hemorrhagic transformation. Ok for prophylactic but no therapeutic anticoagulation.    Discussed with ex-wife to be thinking about and talking to patient about his wishes for PEG should be not be able to swallow in the coming days. She thinks he would want it. Will defer decision to daughter.         GRACIE (acute kidney injury)    IVF hydration, monitoring closely        HLD (hyperlipidemia)     continue home statin        Essential hypertension     Gradual lowering, avoid hypotension        Type 2 diabetes mellitus     Glucose control per primary team            VTE Risk Mitigation         Ordered     enoxaparin injection 40 mg  Daily      07/17/18 0938     IP VTE HIGH RISK PATIENT  Once      07/15/18 1152          Soco Laureano MD  Neurology  [facility]

## 2018-07-18 NOTE — PT/OT/SLP PROGRESS
Physical Therapy      Patient Name:  Kel Lock   MRN:  [id number]    Patient not seen today secondary to Other (upon entering room, noted significant lethargy/fatigue and increased chest rise/accessory muscle use, labored breathing. Not appropriate for PT at this time. ). Will follow-up as pt is more appropriate.    Milli Ronquillo, PTA

## 2018-07-19 PROBLEM — E87.0 HYPERNATREMIA: Status: ACTIVE | Noted: 2018-07-19

## 2018-07-19 LAB
ALBUMIN SERPL BCP-MCNC: 2.8 G/DL
ALLENS TEST: ABNORMAL
ALP SERPL-CCNC: 80 U/L
ALT SERPL W/O P-5'-P-CCNC: 39 U/L
ANION GAP SERPL CALC-SCNC: 11 MMOL/L
AST SERPL-CCNC: 25 U/L
BASOPHILS # BLD AUTO: 0 K/UL
BASOPHILS NFR BLD: 0.3 %
BILIRUB SERPL-MCNC: 0.7 MG/DL
BUN SERPL-MCNC: 17 MG/DL
CALCIUM SERPL-MCNC: 9.2 MG/DL
CHLORIDE SERPL-SCNC: 116 MMOL/L
CO2 SERPL-SCNC: 24 MMOL/L
CREAT SERPL-MCNC: 1.1 MG/DL
DELSYS: ABNORMAL
DIFFERENTIAL METHOD: ABNORMAL
EOSINOPHIL # BLD AUTO: 0.2 K/UL
EOSINOPHIL NFR BLD: 1.5 %
EP: 5
ERYTHROCYTE [DISTWIDTH] IN BLOOD BY AUTOMATED COUNT: 14.4 %
ERYTHROCYTE [SEDIMENTATION RATE] IN BLOOD BY WESTERGREN METHOD: 20 MM/H
EST. GFR  (AFRICAN AMERICAN): >60 ML/MIN/1.73 M^2
EST. GFR  (NON AFRICAN AMERICAN): >60 ML/MIN/1.73 M^2
FIO2: 35
GLUCOSE SERPL-MCNC: 148 MG/DL
HCO3 UR-SCNC: 26.4 MMOL/L (ref 24–28)
HCT VFR BLD AUTO: 43 %
HGB BLD-MCNC: 14 G/DL
IP: 18
LYMPHOCYTES # BLD AUTO: 1.3 K/UL
LYMPHOCYTES NFR BLD: 12.1 %
MCH RBC QN AUTO: 29.8 PG
MCHC RBC AUTO-ENTMCNC: 32.4 G/DL
MCV RBC AUTO: 92 FL
MIN VOL: 15.7
MODE: ABNORMAL
MONOCYTES # BLD AUTO: 0.7 K/UL
MONOCYTES NFR BLD: 6.3 %
NEUTROPHILS # BLD AUTO: 8.6 K/UL
NEUTROPHILS NFR BLD: 79.8 %
PCO2 BLDA: 41.6 MMHG (ref 35–45)
PH SMN: 7.41 [PH] (ref 7.35–7.45)
PLATELET # BLD AUTO: 135 K/UL
PMV BLD AUTO: 9.5 FL
PO2 BLDA: 84 MMHG (ref 80–100)
POC BE: 2 MMOL/L
POC SATURATED O2: 96 % (ref 95–100)
POC TCO2: 28 MMOL/L (ref 23–27)
POCT GLUCOSE: 246 MG/DL (ref 70–110)
POTASSIUM SERPL-SCNC: 4.1 MMOL/L
PROT SERPL-MCNC: 6.3 G/DL
RBC # BLD AUTO: 4.69 M/UL
SAMPLE: ABNORMAL
SITE: ABNORMAL
SODIUM SERPL-SCNC: 151 MMOL/L
SP02: 97
SPONT RATE: 21
WBC # BLD AUTO: 10.8 K/UL

## 2018-07-19 PROCEDURE — 63600175 PHARM REV CODE 636 W HCPCS: Performed by: HOSPITALIST

## 2018-07-19 PROCEDURE — 27000221 HC OXYGEN, UP TO 24 HOURS

## 2018-07-19 PROCEDURE — 99900035 HC TECH TIME PER 15 MIN (STAT)

## 2018-07-19 PROCEDURE — 94660 CPAP INITIATION&MGMT: CPT

## 2018-07-19 PROCEDURE — 80053 COMPREHEN METABOLIC PANEL: CPT

## 2018-07-19 PROCEDURE — G8987 SELF CARE CURRENT STATUS: HCPCS | Mod: CN

## 2018-07-19 PROCEDURE — 36415 COLL VENOUS BLD VENIPUNCTURE: CPT

## 2018-07-19 PROCEDURE — 97167 OT EVAL HIGH COMPLEX 60 MIN: CPT

## 2018-07-19 PROCEDURE — 94761 N-INVAS EAR/PLS OXIMETRY MLT: CPT

## 2018-07-19 PROCEDURE — 25000003 PHARM REV CODE 250: Performed by: HOSPITALIST

## 2018-07-19 PROCEDURE — 97530 THERAPEUTIC ACTIVITIES: CPT

## 2018-07-19 PROCEDURE — G8988 SELF CARE GOAL STATUS: HCPCS | Mod: CM

## 2018-07-19 PROCEDURE — 85025 COMPLETE CBC W/AUTO DIFF WBC: CPT

## 2018-07-19 PROCEDURE — 82803 BLOOD GASES ANY COMBINATION: CPT

## 2018-07-19 PROCEDURE — A4216 STERILE WATER/SALINE, 10 ML: HCPCS | Performed by: INTERNAL MEDICINE

## 2018-07-19 PROCEDURE — 20000000 HC ICU ROOM

## 2018-07-19 PROCEDURE — 36600 WITHDRAWAL OF ARTERIAL BLOOD: CPT

## 2018-07-19 PROCEDURE — 25000003 PHARM REV CODE 250: Performed by: INTERNAL MEDICINE

## 2018-07-19 PROCEDURE — 99233 SBSQ HOSP IP/OBS HIGH 50: CPT | Mod: ,,, | Performed by: PSYCHIATRY & NEUROLOGY

## 2018-07-19 RX ADMIN — SODIUM CHLORIDE, PRESERVATIVE FREE 3 ML: 5 INJECTION INTRAVENOUS at 03:07

## 2018-07-19 RX ADMIN — ATORVASTATIN CALCIUM 40 MG: 40 TABLET, FILM COATED ORAL at 10:07

## 2018-07-19 RX ADMIN — PAROXETINE HYDROCHLORIDE HEMIHYDRATE 40 MG: 20 TABLET, FILM COATED ORAL at 08:07

## 2018-07-19 RX ADMIN — ENOXAPARIN SODIUM 40 MG: 100 INJECTION SUBCUTANEOUS at 08:07

## 2018-07-19 RX ADMIN — BUSPIRONE HYDROCHLORIDE 5 MG: 5 TABLET ORAL at 10:07

## 2018-07-19 RX ADMIN — POLYETHYLENE GLYCOL (3350) 17 G: 17 POWDER, FOR SOLUTION ORAL at 10:07

## 2018-07-19 RX ADMIN — CLOPIDOGREL BISULFATE 75 MG: 75 TABLET ORAL at 10:07

## 2018-07-19 RX ADMIN — SODIUM CHLORIDE, PRESERVATIVE FREE 3 ML: 5 INJECTION INTRAVENOUS at 08:07

## 2018-07-19 RX ADMIN — SODIUM CHLORIDE, PRESERVATIVE FREE 3 ML: 5 INJECTION INTRAVENOUS at 10:07

## 2018-07-19 RX ADMIN — SODIUM CHLORIDE, PRESERVATIVE FREE 3 ML: 5 INJECTION INTRAVENOUS at 06:07

## 2018-07-19 RX ADMIN — INSULIN ASPART 1 UNITS: 100 INJECTION, SOLUTION INTRAVENOUS; SUBCUTANEOUS at 10:07

## 2018-07-19 RX ADMIN — ASPIRIN 300 MG: 300 SUPPOSITORY RECTAL at 03:07

## 2018-07-19 RX ADMIN — METOPROLOL SUCCINATE 50 MG: 50 TABLET, EXTENDED RELEASE ORAL at 10:07

## 2018-07-19 NOTE — PLAN OF CARE
Problem: Patient Care Overview  Goal: Plan of Care Review  Patient on BIPAP setting 18/5 35%FIO2 sats 97%

## 2018-07-19 NOTE — PLAN OF CARE
Problem: Patient Care Overview  Goal: Plan of Care Review  Outcome: Ongoing (interventions implemented as appropriate)  Pt on bipap as ordered with sats greater than 92% sats

## 2018-07-19 NOTE — PLAN OF CARE
Problem: Occupational Therapy Goal  Goal: Occupational Therapy Goal  Goals to be met by: 7/29/18     Patient will increase functional independence with ADLs by performing:    Grooming while in supported sitting with Minimal Assistance and Assistive Devices as needed.  Sitting at edge of bed x5 minutes with Moderate Assistance and use of upper extremity support.  R hand and forearm to be elevated/supported on pillows to prevent edema and shoulder joint subluxation.    Outcome: Ongoing (interventions implemented as appropriate)  OT evaluation completed today. Goals & care plan established.    ANDREW Ahuja  7/19/2018

## 2018-07-19 NOTE — PROGRESS NOTES
Patient rested majority of night.  After waking, patient became increasingly active to the point of repetitively pulling lines and drains to the point of posing a safety concern.  Dr. Hawkins notified and reordered soft-restraints.  Safety protocols maintain to ensure patient's safety.  Day shift nurse notified.  Charge nurse notified.

## 2018-07-19 NOTE — PLAN OF CARE
Problem: Patient Care Overview  Goal: Plan of Care Review  Outcome: Ongoing (interventions implemented as appropriate)  Patient AAOx4.  VSS.  Has remained afebrile this shift.  Safety protocols followed on shift.  Rothman cath intact.  NGT to R nare clamped.  Feedings provided as ordered.  Left sided extremities remain strong, with no purposeful movement on right side.  POC reviewed with patient.  Bed in lowest position, side rails up x2, call light within reach, and safety measures maintained throughout shift.  Patient has remained free of falls, trauma, and injury this shift. Patient denies any needs at this time.  Will continue to monitor.

## 2018-07-19 NOTE — ASSESSMENT & PLAN NOTE
I have personally reviewed the MRI brain. Much of the stroke area is watershed between MCA/KAIT/PCA, however, there is also significant ischemic stroke of the left MCA territory. The patient is at high risk for hemorrhagic transformation, swelling, and complications. Will continue to monitor. I have a low threshold to obtain STAT CT head for any neurologic changes, headache, nausea/vomiting, unresponsiveness.     - Cardiac monitoring  - Permissive HTN up to 220/110, very gradual decrease, avoid hypotension  - Glucose control  - LDL is 63, continue statin  - Carotid US showed carotid occlusion, MRA neck confirmed complete occlusion of the left ICA. Nondiagnostic of the vertebrals due to motion but apparent occlusion of the left vertebral. We cannot obtain CTA neck due to poor renal function.   - Echocardiogram no significant findings  - PMR consult/PT/OT/Speech to evaluate and treat  - Social work consult for discharge planning  - Aspirin 81 mg daily and agree with adding plavix 75 mg daily, watching closely for hemorrhagic transformation. Ok for prophylactic but no therapeutic anticoagulation.    Discussed with ex-wife to be thinking about and talking to patient about his wishes for PEG should be not be able to swallow in the coming days. She thinks he would want it. Will defer decision to daughter.     No further recommendations. I will sign off. Please don't hesitate to consult with questions or concerns.

## 2018-07-19 NOTE — PT/OT/SLP PROGRESS
Speech Language Pathology      Kel Lock  MRN: [id number]    Patient not seen today secondary to lethargic and inability to maintain alertness. RT at bedside, removed BiPAP and placed on NC, however pt unable to keep O2 sats > 88% off bipap. RT placed pt back on BiPAP. Will follow-up 7/20/18. Not appropriate for PO trials today.     Tami Leonard, CCC-SLP

## 2018-07-19 NOTE — PT/OT/SLP EVAL
Occupational Therapy   Evaluation    Name: Kel Lock  MRN: [id number]  Admitting Diagnosis:  Cerebrovascular accident (CVA) due to bilateral thrombosis of carotid arteries      Recommendations:     Discharge Recommendations: rehabilitation facility  Discharge Equipment Recommendations:   (TBD at next level of care)  Barriers to discharge:       History:     Occupational Profile:  Living Environment: Pt lives alone but has a brother who was staying with him.  Previous level of function: Independent with ADL's with no assistive devices.  Roles and Routines: Brother  Equipment Owned:  none  Assistance upon Discharge: Pt will need extensive assistance for self care and mobility tasks.    Past Medical History:   Diagnosis Date    COPD (chronic obstructive pulmonary disease)     Depression     Diabetes mellitus     Hypertension     Stroke        Past Surgical History:   Procedure Laterality Date    carpal tunel surgery Bilateral     coronary artery endartarectomy      FRACTURE SURGERY      SHOULDER SURGERY Right        Subjective     Chief Complaint: None stated  Patient/Family stated goals: Unable to state  Communicated with: Mariella, nurses, prior to session.  Pain/Comfort:  · Pain Rating 1: 0/10  · Pain Rating Post-Intervention 1: 0/10    Patients cultural, spiritual, Pentecostalism conflicts given the current situation:      Objective:     Patient found with: BIPAP, oxygen, telemetry, restraints, NG tube    General Precautions: Standard, fall, NPO, aspiration   Orthopedic Precautions:N/A   Braces: N/A     Occupational Performance:    Bed Mobility:    · Unable to assess 2/2 pt on BiPAP and not alert but likely total assistance.    Activities of Daily Living:  · Dependent for all tasks    Cognitive/Visual Perceptual:  Cognitive/Psychosocial Skills:     -       Oriented to: Person   -       Follows Commands/attention:Inattentive and Follows one-step commands  -       Communication: difficult to understand  2/2 BiPAP  -       Mood/Affect/Coping skills/emotional control: Cooperative, Lethargic and eyes closed  Visual/Perceptual:      -unable to assess    Physical Exam:  Postural examination/scapula alignment:    -       Pt supine with R UE in dependent position at side  Skin integrity: Visible skin intact  Edema:  Mild R hand and wrist  Upper Extremity Range of Motion:     -       Right Upper Extremity: PROM WFL; no AROM observed  -       Left Upper Extremity: WFL   Strength:    -       Left Upper Extremity: WFL, R UE & hand flaccid    Patient left with R UE elevated on pillows with all lines intact, call button in reach, Sheree, nurse notified and Leslie PCT present    [facility] 6 Click:  [facility] Total Score: 6    Treatment & Education:  OT oriented pt to time, place & situation as well as how to use call button.  OT ed pt on OT role & POC as well as discharge recommendations.  OT recommended to nursing staff to keep R UE elevated on pillows to prevent edema and shoulder joint subluxation.  OT also recommended nurse to keep HOB elevated, especially during tube feedings to prevent aspiration and to increase endurance.  Education:    Assessment:     Kel Lock is a 67 y.o. male with a medical diagnosis of Cerebrovascular accident (CVA) due to bilateral thrombosis of carotid arteries.  He presents with a severe decline in functional status due to the below listed impairments, impacting ADLs and functional mobility. Pt was oriented to self only but could follow some simple commands. He was lethargic and kept his eyes closed. Pt is dependent for all self care.  Performance deficits affecting function are weakness, impaired self care skills, impaired balance, impaired endurance, impaired functional mobilty, impaired sensation, impaired cognition, decreased upper extremity function, decreased lower extremity function, decreased safety awareness, edema, impaired coordination.  Pt will benefit from inpatient Rehab due to  "high prior level of functioning in order to facilitate return to prior level of function before returning home.      Rehab Prognosis:  Fair; patient would benefit from acute skilled OT services to address these deficits and reach maximum level of function.         Clinical Decision Making:     3.  OT High:  "Pt evaluation falls under high complexity for evaluation category due to 5+ performance deficits identified with comprehensive assessments and significant modifications/assistance required. An expanded review of history and occupational profile completed in addition to expanded review of physical, cognitive and psychosocial history. Several treatment options considered in care."     Plan:     Patient to be seen 3 x/week to address the above listed problems via self-care/home management, neuromuscular re-education, cognitive retraining, therapeutic exercises, therapeutic activities  · Plan of Care Expires: 08/19/18  · Plan of Care Reviewed with: patient    This Plan of care has been discussed with the patient who was involved in its development and understands and is in agreement with the identified goals and treatment plan    GOALS:    Occupational Therapy Goals        Problem: Occupational Therapy Goal    Goal Priority Disciplines Outcome Interventions   Occupational Therapy Goal     OT, PT/OT Ongoing (interventions implemented as appropriate)    Description:  Goals to be met by: 7/29/18     Patient will increase functional independence with ADLs by performing:    Grooming while in supported sitting with Minimal Assistance and Assistive Devices as needed.  Sitting at edge of bed x5 minutes with Moderate Assistance and use of upper extremity support.  R hand and forearm to be elevated/supported on pillows to prevent edema and shoulder joint subluxation.                      Time Tracking:     OT Date of Treatment: 07/19/18  OT Start Time: 1055  OT Stop Time: 1119  OT Total Time (min): 24 min    Billable " Minutes:Evaluation 14  Therapeutic Activity 10    Yamini Chiu, LOTR  7/19/2018

## 2018-07-19 NOTE — PROGRESS NOTES
[facility]  Neurology  Progress Note    Patient Name: Kel Lock  MRN: [id number]  Admission Date: 7/15/2018  Hospital Length of Stay: 4 days  Code Status: Full Code   Attending Provider: Avinash Hawkins MD  Primary Care Physician: [facility]   Principal Problem:Cerebrovascular accident (CVA) due to bilateral thrombosis of carotid arteries      Subjective:     Interval History: periods of apnea yesterday so placed on CPAP. NG tube remains in place. Neurologically unchanged    Current Facility-Administered Medications   Medication Dose Route Frequency Provider Last Rate Last Dose    aspirin suppository 300 mg  300 mg Rectal Daily Naseem Rutledge MD   300 mg at 07/19/18 1515    atorvastatin tablet 40 mg  40 mg Per NG tube QHS Carrington Garcia MD   40 mg at 07/18/18 2224    busPIRone tablet 5 mg  5 mg Per NG tube Daily Carrington Garcia MD   5 mg at 07/19/18 1038    cloNIDine 0.3 mg/24 hr td ptwk 1 patch  1 patch Transdermal Q7 Days Carrington Garcia MD   Stopped at 07/18/18 0900    clopidogrel tablet 75 mg  75 mg Per NG tube Daily Carrington Garcia MD   75 mg at 07/19/18 1040    dextrose 50% injection 12.5 g  12.5 g Intravenous PRN Carrington Garcia MD        enoxaparin injection 40 mg  40 mg Subcutaneous Daily Carrignton Garcia MD   40 mg at 07/18/18 1757    glucagon (human recombinant) injection 1 mg  1 mg Intramuscular PRN Carrington Garcia MD        insulin aspart U-100 pen 0-5 Units  0-5 Units Subcutaneous PRN Carrington Garcia MD        lorazepam injection 1 mg  1 mg Intravenous Q10 Min PRN Carrington Garcia MD   1 mg at 07/17/18 1030    metoprolol injection 5 mg  5 mg Intravenous Q5 Min PRN Naseem Rutledge MD        metoprolol succinate (TOPROL-XL) 24 hr tablet 50 mg  50 mg Oral Daily Carrington Garcia MD   50 mg at 07/19/18 1041    paroxetine tablet 40 mg  40 mg Per NG tube QAM Carrington Garcia MD   40 mg at 07/19/18 0805    pneumoc 13-sherif conj-dip cr(PF) 0.5 mL  0.5 mL Intramuscular  "vaccine x 1 dose Naseem Rutledge MD        polyethylene glycol packet 17 g  17 g Oral Daily Naseem Rutledge MD   17 g at 07/19/18 1042    sodium chloride 0.9% flush 3 mL  3 mL Intravenous Q8H Naseem Rutledge MD   3 mL at 07/19/18 1517    sodium chloride 0.9% flush 3 mL  3 mL Intravenous Q8H Naseem Rutledge MD   3 mL at 07/19/18 1516    sulfur hexafluoride microspheres injection 2.4 mL  2.4 mL Intravenous ONCE PRN Jf Tapia MD        traZODone tablet 150 mg  150 mg Per NG tube QHS Carrington Garcia MD   150 mg at 07/18/18 2224       Review of Systems   Unable to perform ROS: Mental status change     Objective:     Vital Signs (Most Recent):  Temp: 99.6 °F (37.6 °C) (07/19/18 1536)  Pulse: 92 (07/19/18 1536)  Resp: 16 (07/19/18 1536)  BP: (!) 142/76 (07/19/18 1536)  SpO2: 96 % (07/19/18 1536) Vital Signs (24h Range):  Temp:  [98.8 °F (37.1 °C)-99.6 °F (37.6 °C)] 99.6 °F (37.6 °C)  Pulse:  [83-98] 92  Resp:  [16-24] 16  SpO2:  [95 %-99 %] 96 %  BP: (120-162)/(70-94) 142/76     Weight: 82.1 kg (181 lb)  Body mass index is 27.52 kg/m².    Physical Exam     CPAP in place, opens eyes to voice, answers "yes and no" to questions appropriately, moves left side, no right sided movement seen    Significant Labs: All pertinent lab results from the past 24 hours have been reviewed.    Significant Imaging: I have reviewed and interpreted all pertinent imaging results/findings within the past 24 hours.    Assessment and Plan:     * Cerebrovascular accident (CVA) due to bilateral thrombosis of carotid arteries    I have personally reviewed the MRI brain. Much of the stroke area is watershed between MCA/KAIT/PCA, however, there is also significant ischemic stroke of the left MCA territory. The patient is at high risk for hemorrhagic transformation, swelling, and complications. Will continue to monitor. I have a low threshold to obtain STAT CT head for any neurologic changes, headache, nausea/vomiting, unresponsiveness.     - Cardiac " monitoring  - Permissive HTN up to 220/110, very gradual decrease, avoid hypotension  - Glucose control  - LDL is 63, continue statin  - Carotid US showed carotid occlusion, MRA neck confirmed complete occlusion of the left ICA. Nondiagnostic of the vertebrals due to motion but apparent occlusion of the left vertebral. We cannot obtain CTA neck due to poor renal function.   - Echocardiogram no significant findings  - PMR consult/PT/OT/Speech to evaluate and treat  - Social work consult for discharge planning  - Aspirin 81 mg daily and agree with adding plavix 75 mg daily, watching closely for hemorrhagic transformation. Ok for prophylactic but no therapeutic anticoagulation.    Discussed with ex-wife to be thinking about and talking to patient about his wishes for PEG should be not be able to swallow in the coming days. She thinks he would want it. Will defer decision to daughter.     No further recommendations. I will sign off. Please don't hesitate to consult with questions or concerns.         Acute hypercapnic respiratory failure    CPAP in place        Malnutrition of moderate degree    NG tube in place, need to think about PEG placement        GRACIE (acute kidney injury)    IVF hydration, monitoring closely        HLD (hyperlipidemia)     continue home statin        Essential hypertension     Gradual lowering, avoid hypotension        Type 2 diabetes mellitus     Glucose control per primary team            VTE Risk Mitigation         Ordered     enoxaparin injection 40 mg  Daily      07/17/18 0938     IP VTE HIGH RISK PATIENT  Once      07/15/18 1152          Soco Laureano MD  Neurology  [facility]

## 2018-07-19 NOTE — ASSESSMENT & PLAN NOTE
Patient with GRACIE likely d/t IVVD  Which is currently improving. Labs reviewed- BMP with Estimated Creatinine Clearance: 72.9 mL/min (based on SCr of 1 mg/dL). according to latest data. Monitor UOP and serial BMP and adjust therapy as needed. Renally dose meds for GFR listed above.

## 2018-07-19 NOTE — SUBJECTIVE & OBJECTIVE
Subjective:     Interval History: periods of apnea yesterday so placed on CPAP. NG tube remains in place. Neurologically unchanged    Current Facility-Administered Medications   Medication Dose Route Frequency Provider Last Rate Last Dose    aspirin suppository 300 mg  300 mg Rectal Daily Naseem Rutledge MD   300 mg at 07/19/18 1515    atorvastatin tablet 40 mg  40 mg Per NG tube QHS Carrington Garcia MD   40 mg at 07/18/18 2224    busPIRone tablet 5 mg  5 mg Per NG tube Daily Carrington Garcia MD   5 mg at 07/19/18 1038    cloNIDine 0.3 mg/24 hr td ptwk 1 patch  1 patch Transdermal Q7 Days Carrington Garcia MD   Stopped at 07/18/18 0900    clopidogrel tablet 75 mg  75 mg Per NG tube Daily Carrington Garcia MD   75 mg at 07/19/18 1040    dextrose 50% injection 12.5 g  12.5 g Intravenous PRN Carrington Garcia MD        enoxaparin injection 40 mg  40 mg Subcutaneous Daily Carrington Garcia MD   40 mg at 07/18/18 1757    glucagon (human recombinant) injection 1 mg  1 mg Intramuscular PRN Carrington Garcia MD        insulin aspart U-100 pen 0-5 Units  0-5 Units Subcutaneous PRN Carrington Garcia MD        lorazepam injection 1 mg  1 mg Intravenous Q10 Min PRN Carrington Garcia MD   1 mg at 07/17/18 1030    metoprolol injection 5 mg  5 mg Intravenous Q5 Min PRN Naseem Rutledge MD        metoprolol succinate (TOPROL-XL) 24 hr tablet 50 mg  50 mg Oral Daily Carrington Garcia MD   50 mg at 07/19/18 1041    paroxetine tablet 40 mg  40 mg Per NG tube QAM Carrington Garcia MD   40 mg at 07/19/18 0805    pneumoc 13-sherif conj-dip cr(PF) 0.5 mL  0.5 mL Intramuscular vaccine x 1 dose Naseem Rutledge MD        polyethylene glycol packet 17 g  17 g Oral Daily Naseem Rutledge MD   17 g at 07/19/18 1042    sodium chloride 0.9% flush 3 mL  3 mL Intravenous Q8H Naseem Rutledge MD   3 mL at 07/19/18 1517    sodium chloride 0.9% flush 3 mL  3 mL Intravenous Q8H Naseem Rutledge MD   3 mL at 07/19/18 1516    sulfur hexafluoride microspheres injection 2.4 mL  2.4 mL  "Intravenous ONCE PRN Jf Tapia MD        traZODone tablet 150 mg  150 mg Per NG tube QHS Carrington Garcia MD   150 mg at 07/18/18 2224       Review of Systems   Unable to perform ROS: Mental status change     Objective:     Vital Signs (Most Recent):  Temp: 99.6 °F (37.6 °C) (07/19/18 1536)  Pulse: 92 (07/19/18 1536)  Resp: 16 (07/19/18 1536)  BP: (!) 142/76 (07/19/18 1536)  SpO2: 96 % (07/19/18 1536) Vital Signs (24h Range):  Temp:  [98.8 °F (37.1 °C)-99.6 °F (37.6 °C)] 99.6 °F (37.6 °C)  Pulse:  [83-98] 92  Resp:  [16-24] 16  SpO2:  [95 %-99 %] 96 %  BP: (120-162)/(70-94) 142/76     Weight: 82.1 kg (181 lb)  Body mass index is 27.52 kg/m².    Physical Exam     CPAP in place, opens eyes to voice, answers "yes and no" to questions appropriately, moves left side, no right sided movement seen    Significant Labs: All pertinent lab results from the past 24 hours have been reviewed.    Significant Imaging: I have reviewed and interpreted all pertinent imaging results/findings within the past 24 hours.  "

## 2018-07-19 NOTE — PLAN OF CARE
Problem: Physical Therapy Goal  Goal: Physical Therapy Goal  Goals to be met by: 08/10/2018     Patient will increase functional independence with mobility by performin. Pt will be able to tolerate Supine.  2. Pt will be able to tolerate Sit to supine  3. Pt will be able to tolerate Sit to stand transfer   4. Pt will be able to tolerate Bed to chair transfer with most appropriate device.  5. Pt will be able to tolerate Gait  Activity using most appropriate device.     Goal edited 2018: Goals to be met 2018.    1. Pt will be able to tolerate supine<>sit mod A.  2. Pt will perform supine BLE exercises (actively/passively) x 10-20 as tolerated by patient.   3. Pt will tolerate sit<>stand, stand pivot transfers max A.      Outcome: Ongoing (interventions implemented as appropriate)  Spoke with nursing prior to session, cleared for PT. Pt was able to tolerate supine<>sit with max A x 2. Tolerated sitting EOB x 5 mins with cuing for posture and R sided regard. Confusion and fluctuation level of alertness limited session today. Pt left supine in bed with LUE restrained, needs in reach and lines intact, HOB elevated.

## 2018-07-19 NOTE — PROGRESS NOTES
[facility] Hospital Medicine  Progress Note    Patient Name: Kel Lock  MRN: [id number]  Patient Class: IP- Inpatient   Admission Date: 7/15/2018  Length of Stay: 3 days  Attending Physician: Carrington Garcia MD  Primary Care Provider: [facility]        Subjective:     Principal Problem:Cerebrovascular accident (CVA) due to bilateral thrombosis of carotid arteries    HPI:  Mr. Lock is a 67 YOCM with PMH of HTN, DM, HLD, who was last seen by his ex-wife 3 days ago, normal , and apparently was found lying on floor unable to move himself back to the bed after he had a fall, heard by his roommate. The above statement was obtained from the medical record. Family at bed sided include his daughter, son in-law, ex wife, who have not witness any change in his clinical status.   He was presented to the [place] [facility] with right sided weakness, slurred speech and altered mental status. Telestroke was consulted who calculated his NIH value at 16. Initial CT head showed areas of moderate involutional changes consistent with microvascular ischemic changes with encephalomalacia watershed area of distribution, and subacute infarcts at right and left parieto-occipital cortex.   Pt was communicated with neurology, apparently who recommended MRI of the brain without contrast.   At the time of my interview with the patient, he denied CP, SOB or HA, problem with vision, but he clearly has left preference and right hemineglect. The pt is asking for water since he has touched the floor.       Hospital Course:  No notes on file    Interval History: Patient seen and examined.  No acute events overnight.  Throughout the day, patient remains somnolent and difficult to arouse.  Noted by nursing episodes of apnea and on examination, patient appears to have increasing respiratory effort and decreased breath sounds noted. Patient's ex-wife at the bedside and plan of care discussed with the family at the  bedside nurse in detail.    Review of Systems   Unable to perform ROS: Mental status change     Objective:     Vital Signs (Most Recent):  Temp: 99.1 °F (37.3 °C) (07/18/18 2024)  Pulse: 83 (07/18/18 2024)  Resp: (!) 24 (07/18/18 2024)  BP: 120/74 (07/18/18 2024)  SpO2: 99 % (07/18/18 2024) Vital Signs (24h Range):  Temp:  [96.1 °F (35.6 °C)-99.9 °F (37.7 °C)] 99.1 °F (37.3 °C)  Pulse:  [] 83  Resp:  [18-24] 24  SpO2:  [90 %-99 %] 99 %  BP: (101-179)/(55-96) 120/74     Weight: 82.1 kg (181 lb)  Body mass index is 27.52 kg/m².    Intake/Output Summary (Last 24 hours) at 07/18/18 2026  Last data filed at 07/18/18 1800   Gross per 24 hour   Intake             2020 ml   Output             1600 ml   Net              420 ml      Physical Exam     General exam-lethargic,  man who is having increasing respiratory distress.  HEENT-pupils sluggish but reactive.  Extraocular movements appear intact.  Oropharynx is clear and dry. nasogastric tube present in the patient's right nostril.  Neck exam-no JVD or thyromegaly  Cardiovascular-regular rate rhythm no murmurs gallops rubs  Respiratory-bilateral sonorous breath sounds noted. Decreased breath sounds noted particularly on the left side.  Increased work of breathing noted with subcostal retractions.  Abdomen-soft, nontender nondistended.  Bowel sounds are normoactive.  Extremities-no cyanosis, clubbing, edema.  Noted mild mottling on the left lower extremity.  Pulses are 2+ and symmetric.  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  Right emelia-neglect also noted. Patient gait was not assessed secondary to hemiplegia.    Significant Labs: All pertinent labs within the past 24 hours have been reviewed.    Significant Imaging: I have reviewed all pertinent imaging results/findings within the past 24 hours.    Assessment/Plan:      * Cerebrovascular accident (CVA) due to bilateral thrombosis of carotid arteries    Patient  with large bilateral CVA.  Per depart seems to rest mostly on the left side.  Etiology related to both either hypertensive/embolic given bilateral nature of the stroke.  Echocardiogram pending.  Patient unable to tolerate anything by mouth.   NGT placed..  Continue speech, PT, OT.  NIH SS score 16 per my assessment.  Will follow up with Neurology recommendations, avoid anticoagulation per their recommendations, and attempt to continue anti-platelet therapy as well as statin therapy.  Continue permissive hypertension.  Patient will need extensive further evaluation and rehab given his severe stroke.        Acute hypercapnic respiratory failure    etiology likely unclear but potentially related to metabolic derangements and underlying neurologic disease. Will check ABG and chest x-ray for evidence of worsening respiratory failure and/or hypercarbia.   If so, will start BIPAP and monitor.          GRACIE (acute kidney injury)     Patient with GRACIE likely d/t IVVD  Which is currently improving. Labs reviewed- BMP with Estimated Creatinine Clearance: 72.9 mL/min (based on SCr of 1 mg/dL). according to latest data. Monitor UOP and serial BMP and adjust therapy as needed. Renally dose meds for GFR listed above.            Encephalopathy, metabolic    Multiple potential underlying etiologies, with neurologic(CVA) and metabolic derangements being most common and potential hypercarbia as well.  Will continue to monitor mental status closely.          Malnutrition of moderate degree    NGT inserted. Continue feeds. May need PEG if unable to swallow.          HLD (hyperlipidemia)     Patient is chronically on statin. Will continue for now. Monitor clinically. Last LDL was   Lab Results   Component Value Date    LDLCALC 63.8 07/15/2018                Essential hypertension    Currently stable and observing permissive HTN, as per Neurology evaluation. Ok for BP<220/110.          Type 2 diabetes mellitus    Patient's FSGs are  controlled on current hypoglycemics.   Last A1c reviewed-   Lab Results   Component Value Date    HGBA1C 7.2 (H) 07/15/2018     Most recent fingerstick glucose reviewed-     Recent Labs  Lab 07/17/18  2312 07/18/18  1207 07/18/18  1706   POCTGLUCOSE 118* 164* 127*     Current correctional scale  Low  Maintain anti-hyperglycemic dose as follows-   Antihyperglycemics     Start     Stop Route Frequency Ordered    07/17/18 1744  insulin aspart U-100 pen 0-5 Units      -- SubQ As needed (PRN) 07/17/18 1644                  VTE Risk Mitigation         Ordered     enoxaparin injection 40 mg  Daily      07/17/18 0938     IP VTE HIGH RISK PATIENT  Once      07/15/18 1152              Carrington Garcia MD  Department of Hospital Medicine   [facility]

## 2018-07-19 NOTE — ASSESSMENT & PLAN NOTE
Patient's FSGs are controlled on current hypoglycemics.   Last A1c reviewed-   Lab Results   Component Value Date    HGBA1C 7.2 (H) 07/15/2018     Most recent fingerstick glucose reviewed-     Recent Labs  Lab 07/17/18  2312 07/18/18  1207 07/18/18  1706   POCTGLUCOSE 118* 164* 127*     Current correctional scale  Low  Maintain anti-hyperglycemic dose as follows-   Antihyperglycemics     Start     Stop Route Frequency Ordered    07/17/18 1744  insulin aspart U-100 pen 0-5 Units      -- SubQ As needed (PRN) 07/17/18 8114

## 2018-07-19 NOTE — PT/OT/SLP PROGRESS
Physical Therapy Treatment    Patient Name:  Kel Lock   MRN:  [id number]    Recommendations:     Discharge Recommendations:  nursing facility, skilled, rehabilitation facility (pending progress while acute)   Discharge Equipment Recommendations:  (unable to determine at this time)   Barriers to discharge: None    Assessment:     Kel Lock is a 67 y.o. male admitted with a medical diagnosis of Cerebrovascular accident (CVA) due to bilateral thrombosis of carotid arteries.  He presents with the following impairments/functional limitations:  weakness, impaired endurance, decreased coordination, impaired cognition, impaired balance, impaired functional mobilty, decreased safety awareness which limits safe functional mobility, able to tolerate sitting EOB today.    Rehab Prognosis:  FAIR; patient would benefit from acute skilled PT services to address these deficits and reach maximum level of function.      Recent Surgery: * No surgery found *      Plan:     During this hospitalization, patient to be seen 6 x/week to address the above listed problems via gait training, therapeutic activities, therapeutic exercises, neuromuscular re-education  · Plan of Care Expires:  08/24/18   Plan of Care Reviewed with: patient    Subjective     Communicated with nurse Jo/Sheree prior to session.  Patient found supine in bed upon PT entry to room, agreeable to treatment.      Chief Complaint: no major complaints  Patient comments/goals: unable to clearly verbalize goals  Pain/Comfort:  · Pain Rating 1: 0/10 (no complaints of pain to L shoulder today)    Patients cultural, spiritual, Spiritism conflicts given the current situation:      Objective:     Patient found with: BIPAP, peripheral IV, restraints, telemetry, SCD     General Precautions: Standard, fall   Orthopedic Precautions:N/A   Braces: N/A     Functional Mobility:  · Bed Mobility:     · Supine to Sit: maximal assistance x 2  · Sit to Supine: maximal assistance x  2      AM-PAC 6 CLICK MOBILITY  Turning over in bed (including adjusting bedclothes, sheets and blankets)?: 2  Sitting down on and standing up from a chair with arms (e.g., wheelchair, bedside commode, etc.): 1  Moving from lying on back to sitting on the side of the bed?: 2  Moving to and from a bed to a chair (including a wheelchair)?: 1  Need to walk in hospital room?: 1  Climbing 3-5 steps with a railing?: 1  Basic Mobility Total Score: 8       Therapeutic Activities and Exercises:   tolerated sitting EOB x 3-5 with max/mod A for balance and increased cuing for posture    Patient left supine with all lines intact, call button in reach and nursing notified; and restraint to LUE in place.    GOALS:    Physical Therapy Goals        Problem: Physical Therapy Goal    Goal Priority Disciplines Outcome Goal Variances Interventions   Physical Therapy Goal     PT/OT, PT Ongoing (interventions implemented as appropriate)     Description:  Goals to be met by: 08/10/2018     Patient will increase functional independence with mobility by performin. Pt will be able to tolerate Supine.  2. Pt will be able to tolerate Sit to supine  3. Pt will be able to tolerate Sit to stand transfer   4. Pt will be able to tolerate Bed to chair transfer with most appropriate device.  5. Pt will be able to tolerate Gait  Activity using most appropriate device.     Goal edited 2018: Goals to be met 2018.    1. Pt will be able to tolerate supine<>sit mod A.  2. Pt will perform supine BLE exercises (actively/passively) x 10-20 as tolerated by patient.   3. Pt will tolerate sit<>stand, stand pivot transfers max A.                        Time Tracking:     PT Received On: 18  PT Start Time: 1008     PT Stop Time: 1030  PT Total Time (min): 22 min     Billable Minutes: Therapeutic Activity 22    Treatment Type: Treatment  PT/PTA: PT     PTA Visit Number: 0     Heaven Veliz PT  2018

## 2018-07-19 NOTE — ASSESSMENT & PLAN NOTE
Patient with large bilateral CVA.  Per depart seems to rest mostly on the left side.  Etiology related to both either hypertensive/embolic given bilateral nature of the stroke.  Echocardiogram pending.  Patient unable to tolerate anything by mouth.  NGT placed..  Continue speech, PT, OT.  NIH SS score 16 per my assessment.  Will follow up with Neurology recommendations, avoid anticoagulation per their recommendations, and attempt to continue anti-platelet therapy as well as statin therapy.  Continue permissive hypertension.  Patient will need extensive further evaluation and rehab given his severe stroke.

## 2018-07-19 NOTE — ASSESSMENT & PLAN NOTE
Multiple potential underlying etiologies, with neurologic(CVA) and metabolic derangements being most common and potential hypercarbia as well.  Will continue to monitor mental status closely.

## 2018-07-19 NOTE — SUBJECTIVE & OBJECTIVE
Interval History: Patient seen and examined.  No acute events overnight.  Throughout the day, patient remains somnolent and difficult to arouse.  Noted by nursing episodes of apnea and on examination, patient appears to have increasing respiratory effort and decreased breath sounds noted. Patient's ex-wife at the bedside and plan of care discussed with the family at the bedside nurse in detail.    Review of Systems   Unable to perform ROS: Mental status change     Objective:     Vital Signs (Most Recent):  Temp: 99.1 °F (37.3 °C) (07/18/18 2024)  Pulse: 83 (07/18/18 2024)  Resp: (!) 24 (07/18/18 2024)  BP: 120/74 (07/18/18 2024)  SpO2: 99 % (07/18/18 2024) Vital Signs (24h Range):  Temp:  [96.1 °F (35.6 °C)-99.9 °F (37.7 °C)] 99.1 °F (37.3 °C)  Pulse:  [] 83  Resp:  [18-24] 24  SpO2:  [90 %-99 %] 99 %  BP: (101-179)/(55-96) 120/74     Weight: 82.1 kg (181 lb)  Body mass index is 27.52 kg/m².    Intake/Output Summary (Last 24 hours) at 07/18/18 2026  Last data filed at 07/18/18 1800   Gross per 24 hour   Intake             2020 ml   Output             1600 ml   Net              420 ml      Physical Exam     General exam-lethargic,  man who is having increasing respiratory distress.  HEENT-pupils sluggish but reactive.  Extraocular movements appear intact.  Oropharynx is clear and dry. nasogastric tube present in the patient's right nostril.  Neck exam-no JVD or thyromegaly  Cardiovascular-regular rate rhythm no murmurs gallops rubs  Respiratory-bilateral sonorous breath sounds noted. Decreased breath sounds noted particularly on the left side.  Increased work of breathing noted with subcostal retractions.  Abdomen-soft, nontender nondistended.  Bowel sounds are normoactive.  Extremities-no cyanosis, clubbing, edema.  Noted mild mottling on the left lower extremity.  Pulses are 2+ and symmetric.  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  Right emelia-neglect  also noted. Patient gait was not assessed secondary to hemiplegia.    Significant Labs: All pertinent labs within the past 24 hours have been reviewed.    Significant Imaging: I have reviewed all pertinent imaging results/findings within the past 24 hours.

## 2018-07-20 LAB — POCT GLUCOSE: 209 MG/DL (ref 70–110)

## 2018-07-20 PROCEDURE — 94660 CPAP INITIATION&MGMT: CPT

## 2018-07-20 PROCEDURE — 99900035 HC TECH TIME PER 15 MIN (STAT)

## 2018-07-20 PROCEDURE — 20000000 HC ICU ROOM

## 2018-07-20 PROCEDURE — 27000221 HC OXYGEN, UP TO 24 HOURS

## 2018-07-20 PROCEDURE — 94761 N-INVAS EAR/PLS OXIMETRY MLT: CPT

## 2018-07-20 PROCEDURE — 97803 MED NUTRITION INDIV SUBSEQ: CPT

## 2018-07-20 PROCEDURE — 25000003 PHARM REV CODE 250: Performed by: INTERNAL MEDICINE

## 2018-07-20 PROCEDURE — 25000003 PHARM REV CODE 250: Performed by: HOSPITALIST

## 2018-07-20 PROCEDURE — 63600175 PHARM REV CODE 636 W HCPCS: Performed by: HOSPITALIST

## 2018-07-20 PROCEDURE — A4216 STERILE WATER/SALINE, 10 ML: HCPCS | Performed by: INTERNAL MEDICINE

## 2018-07-20 RX ADMIN — ENOXAPARIN SODIUM 40 MG: 100 INJECTION SUBCUTANEOUS at 04:07

## 2018-07-20 RX ADMIN — LORAZEPAM 1 MG: 2 INJECTION, SOLUTION INTRAMUSCULAR; INTRAVENOUS at 02:07

## 2018-07-20 RX ADMIN — POLYETHYLENE GLYCOL (3350) 17 G: 17 POWDER, FOR SOLUTION ORAL at 09:07

## 2018-07-20 RX ADMIN — METOPROLOL SUCCINATE 50 MG: 50 TABLET, EXTENDED RELEASE ORAL at 09:07

## 2018-07-20 RX ADMIN — SODIUM CHLORIDE, PRESERVATIVE FREE 3 ML: 5 INJECTION INTRAVENOUS at 09:07

## 2018-07-20 RX ADMIN — PAROXETINE HYDROCHLORIDE HEMIHYDRATE 40 MG: 20 TABLET, FILM COATED ORAL at 09:07

## 2018-07-20 RX ADMIN — ASPIRIN 300 MG: 300 SUPPOSITORY RECTAL at 09:07

## 2018-07-20 RX ADMIN — SODIUM CHLORIDE, PRESERVATIVE FREE 3 ML: 5 INJECTION INTRAVENOUS at 06:07

## 2018-07-20 RX ADMIN — CLOPIDOGREL BISULFATE 75 MG: 75 TABLET ORAL at 09:07

## 2018-07-20 RX ADMIN — ATORVASTATIN CALCIUM 40 MG: 40 TABLET, FILM COATED ORAL at 09:07

## 2018-07-20 RX ADMIN — TRAZODONE HYDROCHLORIDE 150 MG: 50 TABLET ORAL at 09:07

## 2018-07-20 RX ADMIN — BUSPIRONE HYDROCHLORIDE 5 MG: 5 TABLET ORAL at 09:07

## 2018-07-20 RX ADMIN — INSULIN ASPART 1 UNITS: 100 INJECTION, SOLUTION INTRAVENOUS; SUBCUTANEOUS at 05:07

## 2018-07-20 RX ADMIN — SODIUM CHLORIDE, PRESERVATIVE FREE 3 ML: 5 INJECTION INTRAVENOUS at 02:07

## 2018-07-20 NOTE — PROGRESS NOTES
[facility] Medicine  Progress Note    Patient Name: Kel Lock  MRN: [id number]  Patient Class: IP- Inpatient   Admission Date: 7/15/2018  Length of Stay: 4 days  Attending Physician: Avinash Hawkins MD  Primary Care Provider: [facility] Senait Asif        Subjective:     Principal Problem:Cerebrovascular accident (CVA) due to bilateral thrombosis of carotid arteries    HPI:  Mr. Lock is a 67 YOCM with PMH of HTN, DM, HLD, who was last seen by his ex-wife 3 days ago, normal , and apparently was found lying on floor unable to move himself back to the bed after he had a fall, heard by his roommate. The above statement was obtained from the medical record. Family at bed sided include his daughter, son in-law, ex wife, who have not witness any change in his clinical status.   He was presented to the [place] ER with right sided weakness, slurred speech and altered mental status. Telestroke was consulted who calculated his NIH value at 16. Initial CT head showed areas of moderate involutional changes consistent with microvascular ischemic changes with encephalomalacia watershed area of distribution, and subacute infarcts at right and left parieto-occipital cortex.   Pt was communicated with neurology, apparently who recommended MRI of the brain without contrast.   At the time of my interview with the patient, he denied CP, SOB or HA, problem with vision, but he clearly has left preference and right hemineglect. The pt is asking for water since he has touched the floor.       Hospital Course:  7/19: 66 y/o unfortunate  who has suffered a very large territory, bilateral CVA, including L MCA territory. Unable to perform MRI due to presence of ICD. Was not a candidate for tPA or endovascular therapy --> treating with statin, anti-htn, and DAPT. Does not appear to have made any meaningful clinical progress. Has flaccid paralysis of R side. Occasionally follows some minior  commands with fluctuating consciousness. Some resp distress requiring BiPAP. Long discussion at bedside with family about GoC.     Review of Systems  Objective:     Vital Signs (Most Recent):  Temp: 99.6 °F (37.6 °C) (07/19/18 1536)  Pulse: 92 (07/19/18 1536)  Resp: 16 (07/19/18 1536)  BP: (!) 142/76 (07/19/18 1536)  SpO2: 96 % (07/19/18 1536) Vital Signs (24h Range):  Temp:  [98.8 °F (37.1 °C)-99.6 °F (37.6 °C)] 99.6 °F (37.6 °C)  Pulse:  [83-98] 92  Resp:  [16-24] 16  SpO2:  [95 %-99 %] 96 %  BP: (120-162)/(70-94) 142/76     Weight: 82.1 kg (181 lb)  Body mass index is 27.52 kg/m².    Intake/Output Summary (Last 24 hours) at 07/19/18 1917  Last data filed at 07/19/18 0807   Gross per 24 hour   Intake              380 ml   Output              350 ml   Net               30 ml      Physical Exam  General exam-lethargic, some resp distress; on NIPPV  HEENT-pupils sluggish but reactive.  Extraocular movements appear intact.  Oropharynx is clear and dry. nasogastric tube present in the patient's right nostril.  Neck exam-no JVD or thyromegaly  Cardiovascular-regular rate rhythm no murmurs gallops rubs  Respiratory-bilateral sonorous breath sounds noted. Decreased breath sounds noted particularly on the left side.  Increased work of breathing noted with subcostal retractions.  Abdomen-soft, nontender nondistended.  Bowel sounds are normoactive.  Extremities-no cyanosis, clubbing, edema.  Noted mild mottling on the left lower extremity.  Pulses are 2+ and symmetric.  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  Right emelia-neglect also noted. Patient gait was not assessed secondary to hemiplegia.    Significant Labs:   BMP:   Recent Labs  Lab 07/18/18  0445 07/19/18  0647   * 148*   * 151*   K 4.0 4.1   * 116*   CO2 21* 24   BUN 17 17   CREATININE 1.0 1.1   CALCIUM 8.6* 9.2   MG 2.0  --        Significant Imaging: I have reviewed all pertinent imaging  results/findings within the past 24 hours.    Assessment/Plan:      * Cerebrovascular accident (CVA) due to bilateral thrombosis of carotid arteries    - large territory bilateral CVA including L MCA territory  - numerous risk factors including HLD, htn, previous carotid disease, and smoking  - agree with Neuro, at risk for hemorrgaic conversion given size of infarction  - continue DAPT, statin, and cautious anti-htn (alowing for permissive htn for now)  - unlikely to make any meaningful recovery; had GoC disucsison with family --> to discuss code status and prognosis          Type 2 diabetes mellitus    Patient's FSGs are controlled on current hypoglycemics.   Last A1c reviewed-   Lab Results   Component Value Date    HGBA1C 7.2 (H) 07/15/2018     Most recent fingerstick glucose reviewed-   No results for input(s): POCTGLUCOSE in the last 24 hours.  Current correctional scale  Low  Maintain anti-hyperglycemic dose as follows-   Antihyperglycemics     Start     Stop Route Frequency Ordered    07/17/18 1744  insulin aspart U-100 pen 0-5 Units      -- SubQ As needed (PRN) 07/17/18 1644                Essential hypertension    - allowing for permissive htn to perfuse penumbra (220/110)  - will cautiously re-institute anti-htn slowly          Acute hypercapnic respiratory failure    - etiology likely unclear but potentially related to metabolic derangements and underlying neurologic disease  - will continue BiPAP for now            Encephalopathy, metabolic    Multiple potential underlying etiologies, with neurologic(CVA) and metabolic derangements being most common and potential hypercarbia as well.  Will continue to monitor mental status closely.          Malnutrition of moderate degree    NGT inserted. Continue feeds. May need PEG if unable to swallow.          GRACIE (acute kidney injury)     Patient with GRACIE likely d/t IVVD  Which is currently improving. Labs reviewed- BMP with Estimated Creatinine Clearance: 72.9  mL/min (based on SCr of 1 mg/dL). according to latest data. Monitor UOP and serial BMP and adjust therapy as needed. Renally dose meds for GFR listed above.            HLD (hyperlipidemia)     Patient is chronically on statin. Will continue for now. Monitor clinically. Last LDL was   Lab Results   Component Value Date    LDLCALC 63.8 07/15/2018                  VTE Risk Mitigation         Ordered     enoxaparin injection 40 mg  Daily      07/17/18 0938     IP VTE HIGH RISK PATIENT  Once      07/15/18 1152              Avinash Hawkins MD  Department of Hospital Medicine   [facility]

## 2018-07-20 NOTE — PROGRESS NOTES
07/20/18 0347   Patient Assessment/Suction   Level of Consciousness (AVPU) responds to voice   Respiratory Effort Unlabored   Expansion/Accessory Muscles/Retractions no use of accessory muscles   PRE-TX-O2-ETCO2   O2 Device (Oxygen Therapy) BiPAP   Oxygen Concentration (%) 40   SpO2 95 %   Pulse 101   Resp (!) 21   Preset CPAP/BiPAP Settings   Mode Of Delivery BiPAP   $ Initial CPAP/BiPAP Setup? No   $ Is patient using? Yes   Size of Mask Large   Sized Appropriately? Yes   Equipment Type V60   Airway Device Type large full face mask  (Performax placed on pt due to redness on bridge of nose.)   Ipap 18   EPAP (cm H2O) 5   Pressure Support (cm H2O) 13   Set Rate (Breaths/Min) 20   ITime (sec) 1   Rise Time (sec) 0.2   Patient CPAP/BiPAP Settings   RR Total (Breaths/Min) 20   Tidal Volume (mL) 771   VE Minute Ventilation (L/min) 15 L/min   Peak Inspiratory Pressure (cm H2O) 19   TiTOT (%) 34   Total Leak (L/Min) 22   Patient Trigger - ST Mode Only (%) 34   CPAP/BiPAP Alarms   High Pressure (cm H2O) 25   Low Pressure (cm H2O) 10   Low Pressure Delay (Sec) 20   Minute Ventilation (L/Min) 3   High RR (breaths/min) 50   Low RR (breaths/min) 10   Performax placed on pt due to redness on bridge of nose.

## 2018-07-20 NOTE — NURSING
Spoke at length with Aline from dietary, patient started on continuous feedings instead of bolus feedings. Will continue to monitor closely.

## 2018-07-20 NOTE — PLAN OF CARE
Problem: Patient Care Overview  Goal: Plan of Care Review  Outcome: Ongoing (interventions implemented as appropriate)  Care plan reviewed.  Safety maintained. Left soft wrist restraint in place and monitored per policy.  Rotation therapy utilized.  Patient right side/upper and lower are flaccid.  Patient is febrile, high temp 100.2.  Patient gets bolus tube feeding and H2O flushes.  Patient was on bipap, mask kept slipping, changed over to nasal canula, will monitor closely for decreasing SPO2.   Family at bedside, [facility] faxed request of information regarding if patient had advance directives filed with them, Mr. Acevedo called here to report they have nothing on record for patient regarding code status. Family will decide about comfort care vs doing everything.

## 2018-07-20 NOTE — ASSESSMENT & PLAN NOTE
Patient's FSGs are controlled on current hypoglycemics.   Last A1c reviewed-   Lab Results   Component Value Date    HGBA1C 7.2 (H) 07/15/2018     Most recent fingerstick glucose reviewed-   No results for input(s): POCTGLUCOSE in the last 24 hours.  Current correctional scale  Low  Maintain anti-hyperglycemic dose as follows-   Antihyperglycemics     Start     Stop Route Frequency Ordered    07/17/18 1744  insulin aspart U-100 pen 0-5 Units      -- SubQ As needed (PRN) 07/17/18 6604

## 2018-07-20 NOTE — SUBJECTIVE & OBJECTIVE
Review of Systems  Objective:     Vital Signs (Most Recent):  Temp: (!) 100.8 °F (38.2 °C) (07/20/18 0730)  Pulse: 91 (07/20/18 0953)  Resp: (!) 21 (07/20/18 0750)  BP: 109/60 (07/20/18 0953)  SpO2: 97 % (07/20/18 0750) Vital Signs (24h Range):  Temp:  [99.2 °F (37.3 °C)-100.8 °F (38.2 °C)] 100.8 °F (38.2 °C)  Pulse:  [] 91  Resp:  [16-27] 21  SpO2:  [93 %-97 %] 97 %  BP: (103-176)/() 109/60     Weight: 82.1 kg (181 lb)  Body mass index is 27.52 kg/m².    Intake/Output Summary (Last 24 hours) at 07/20/18 1122  Last data filed at 07/20/18 0400   Gross per 24 hour   Intake              350 ml   Output             1400 ml   Net            -1050 ml      Physical Exam  General exam- more awake/alert, some resp distress; on NIPPV  HEENT-pupils sluggish but reactive.  Extraocular movements appear intact.  Oropharynx is clear and dry. nasogastric tube present in the patient's right nostril.  Neck exam-no JVD or thyromegaly  Cardiovascular-regular rate rhythm no murmurs gallops rubs  Respiratory-bilateral sonorous breath sounds noted. Decreased breath sounds noted particularly on the left side.  Increased work of breathing noted with subcostal retractions.  Abdomen-soft, nontender nondistended.  Bowel sounds are normoactive.  Extremities-no cyanosis, clubbing, edema.  Noted mild mottling on the left lower extremity.  Pulses are 2+ and symmetric.  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  Right emelia-neglect also noted. Patient gait was not assessed secondary to hemiplegia.      Significant Labs:   BMP:   Recent Labs  Lab 07/19/18  0647   *   *   K 4.1   *   CO2 24   BUN 17   CREATININE 1.1   CALCIUM 9.2       Significant Imaging: I have reviewed all pertinent imaging results/findings within the past 24 hours.

## 2018-07-20 NOTE — PLAN OF CARE
Problem: Patient Care Overview  Goal: Plan of Care Review  Outcome: Ongoing (interventions implemented as appropriate)  Plan of care reviewed with patient no evidence of learning s/p CVA is alert only to self cont on left arm restraints to maintain NG tube and bipap progressive mobility preformed in bed. Tried to remove restraints unsuccessful pt cont to thrash and pull at tubes had to restart. Pt remains NPO all meds and feeding through NG tube family at bedside will cont to monitor.

## 2018-07-20 NOTE — ASSESSMENT & PLAN NOTE
- large territory bilateral CVA including L MCA territory  - numerous risk factors including HLD, htn, previous carotid disease, and smoking  - agree with Neuro, at risk for hemorrgaic conversion given size of infarction --> monitor and repeat imaging if abrupt Neuro change  - continue DAPT, statin, and cautious anti-htn (alowing for permissive htn for now)  - unlikely to make any meaningful recovery; had repeat GoC disucssion with family --> to discuss code status and prognosis

## 2018-07-20 NOTE — PROGRESS NOTES
[facility] Medicine  Progress Note    Patient Name: Kel Lock  MRN: [id number]  Patient Class: IP- Inpatient   Admission Date: 7/15/2018  Length of Stay: 5 days  Attending Physician: Avinash Hawkins MD  Primary Care Provider: [facility] Senait Asif        Subjective:     Principal Problem:Cerebrovascular accident (CVA) due to bilateral thrombosis of carotid arteries    HPI:  Mr. Lock is a 67 YOCM with PMH of HTN, DM, HLD, who was last seen by his ex-wife 3 days ago, normal , and apparently was found lying on floor unable to move himself back to the bed after he had a fall, heard by his roommate. The above statement was obtained from the medical record. Family at bed sided include his daughter, son in-law, ex wife, who have not witness any change in his clinical status.   He was presented to the [place] ER with right sided weakness, slurred speech and altered mental status. Telestroke was consulted who calculated his NIH value at 16. Initial CT head showed areas of moderate involutional changes consistent with microvascular ischemic changes with encephalomalacia watershed area of distribution, and subacute infarcts at right and left parieto-occipital cortex.   Pt was communicated with neurology, apparently who recommended MRI of the brain without contrast.   At the time of my interview with the patient, he denied CP, SOB or HA, problem with vision, but he clearly has left preference and right hemineglect. The pt is asking for water since he has touched the floor.       Hospital Course:  7/19: 66 y/o unfortunate  who has suffered a very large territory, bilateral CVA, including L MCA territory. Unable to perform MRI due to presence of ICD. Was not a candidate for tPA or endovascular therapy --> treating with statin, anti-htn, and DAPT. Does not appear to have made any meaningful clinical progress. Has flaccid paralysis of R side. Occasionally follows some minior  commands with fluctuating consciousness. Some resp distress requiring BiPAP. Long discussion at bedside with family about GoC.     7/20: Pt seen and examined. Moved to ICU overnight due to needs for BiPAP. More awake/alert this AM, but quite agitated. Spontaneous movement of L side, but remains with R flaccid paralysis. Hemodynamically otherwise stable at the moment.     Review of Systems  Objective:     Vital Signs (Most Recent):  Temp: (!) 100.8 °F (38.2 °C) (07/20/18 0730)  Pulse: 91 (07/20/18 0953)  Resp: (!) 21 (07/20/18 0750)  BP: 109/60 (07/20/18 0953)  SpO2: 97 % (07/20/18 0750) Vital Signs (24h Range):  Temp:  [99.2 °F (37.3 °C)-100.8 °F (38.2 °C)] 100.8 °F (38.2 °C)  Pulse:  [] 91  Resp:  [16-27] 21  SpO2:  [93 %-97 %] 97 %  BP: (103-176)/() 109/60     Weight: 82.1 kg (181 lb)  Body mass index is 27.52 kg/m².    Intake/Output Summary (Last 24 hours) at 07/20/18 1122  Last data filed at 07/20/18 0400   Gross per 24 hour   Intake              350 ml   Output             1400 ml   Net            -1050 ml      Physical Exam  General exam- more awake/alert, some resp distress; on NIPPV  HEENT-pupils sluggish but reactive.  Extraocular movements appear intact.  Oropharynx is clear and dry. nasogastric tube present in the patient's right nostril.  Neck exam-no JVD or thyromegaly  Cardiovascular-regular rate rhythm no murmurs gallops rubs  Respiratory-bilateral sonorous breath sounds noted. Decreased breath sounds noted particularly on the left side.  Increased work of breathing noted with subcostal retractions.  Abdomen-soft, nontender nondistended.  Bowel sounds are normoactive.  Extremities-no cyanosis, clubbing, edema.  Noted mild mottling on the left lower extremity.  Pulses are 2+ and symmetric.  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  Right emelia-neglect also noted. Patient gait was not assessed secondary to hemiplegia.      Significant Labs:   BMP:    Recent Labs  Lab 07/19/18  0647   *   *   K 4.1   *   CO2 24   BUN 17   CREATININE 1.1   CALCIUM 9.2       Significant Imaging: I have reviewed all pertinent imaging results/findings within the past 24 hours.    Assessment/Plan:      * Cerebrovascular accident (CVA) due to bilateral thrombosis of carotid arteries    - large territory bilateral CVA including L MCA territory  - numerous risk factors including HLD, htn, previous carotid disease, and smoking  - agree with Neuro, at risk for hemorrgaic conversion given size of infarction --> monitor and repeat imaging if abrupt Neuro change  - continue DAPT, statin, and cautious anti-htn (alowing for permissive htn for now)  - unlikely to make any meaningful recovery; had repeat GoC disucssion with family --> to discuss code status and prognosis          Type 2 diabetes mellitus    - adequate glycemic control for now  - assessing nutrition needs --> may start low dosetefeeds cautiously    Lab Results   Component Value Date    HGBA1C 7.2 (H) 07/15/2018     Most recent fingerstick glucose reviewed-     Recent Labs  Lab 07/19/18  2210   POCTGLUCOSE 246*     Current correctional scale  Low  Maintain anti-hyperglycemic dose as follows-   Antihyperglycemics     Start     Stop Route Frequency Ordered    07/17/18 1744  insulin aspart U-100 pen 0-5 Units      -- SubQ As needed (PRN) 07/17/18 1644                Essential hypertension    - allowing for permissive htn to perfuse penumbra (220/110)  - will cautiously re-institute anti-htn slowly          Acute hypercapnic respiratory failure    - etiology likely unclear but potentially related to metabolic derangements and underlying neurologic disease  - will continue BiPAP for   - does have underlying COPD            Hypernatremia    - Na 151yesterday and trending up  - related to free H2O deficit from inability to swallow and drink  - will increase free H2O via NG tube and monitor          Encephalopathy,  metabolic    Multiple potential underlying etiologies, with neurologic(CVA) and metabolic derangements being most common and potential hypercarbia as well.  Will continue to monitor mental status closely.          Malnutrition of moderate degree    NGT inserted. Continue feeds. May need PEG if unable to swallow.          GRACIE (acute kidney injury)     Patient with GRACIE likely d/t IVVD  Which is currently improving. Labs reviewed- BMP with Estimated Creatinine Clearance: 72.9 mL/min (based on SCr of 1 mg/dL). according to latest data. Monitor UOP and serial BMP and adjust therapy as needed. Renally dose meds for GFR listed above.            HLD (hyperlipidemia)     Patient is chronically on statin. Will continue for now. Monitor clinically. Last LDL was   Lab Results   Component Value Date    LDLCALC 63.8 07/15/2018                  VTE Risk Mitigation         Ordered     enoxaparin injection 40 mg  Daily      07/17/18 0938     IP VTE HIGH RISK PATIENT  Once      07/15/18 1152          Critical care time spent on the evaluation and treatment of severe organ dysfunction, review of pertinent labs and imaging studies, discussions with consulting providers and discussions with patient/family: 45 minutes.    Avinash Hawkins MD  Department of Hospital Medicine   [facility]

## 2018-07-20 NOTE — ASSESSMENT & PLAN NOTE
- large territory bilateral CVA including L MCA territory  - numerous risk factors including HLD, htn, previous carotid disease, and smoking  - agree with Neuro, at risk for hemorrgaic conversion given size of infarction  - continue DAPT, statin, and cautious anti-htn (alowing for permissive htn for now)  - unlikely to make any meaningful recovery; had GoC disucsison with family --> to discuss code status and prognosis

## 2018-07-20 NOTE — NURSING TRANSFER
Nursing Transfer Note      7/19/2018     Transfer To: ICU from Med/Surg     Transfer via bed    Transfer with cardiac monitoring, [place], and Bipap    Transported by [facility]     [facility] sent: Novolog    Chart send with patient: Yes    Notified: Daughter    Patient reassessed at: 07/19/2018 2230    Upon arrival to floor: patient oriented to room, call bell in reach and bed in lowest position

## 2018-07-20 NOTE — PLAN OF CARE
07/19/18 2030   Patient Assessment/Suction   Level of Consciousness (AVPU) responds to voice   PRE-TX-O2-ETCO2   O2 Device (Oxygen Therapy) BiPAP   Oxygen Concentration (%) 40   SpO2 (!) 93 %   Pulse Oximetry Type Intermittent   Ready to Wean/Extubation Screen   FIO2<=50 (chart decimal) 0.4   Preset CPAP/BiPAP Settings   Mode Of Delivery BiPAP   $ Initial CPAP/BiPAP Setup? No   $ Is patient using? Yes   Equipment Type V60   Airway Device Type large full face mask   Humidifier not applicable   Ipap 18   EPAP (cm H2O) 5   Pressure Support (cm H2O) 13   ITime (sec) 1   Rise Time (sec) 2   Patient CPAP/BiPAP Settings   Timed Inspiration (Sec) 1   IPAP Rise Time (sec) 2   RR Total (Breaths/Min) 20   Tidal Volume (mL) 1191   VE Minute Ventilation (L/min) 22 L/min   Peak Inspiratory Pressure (cm H2O) 18   TiTOT (%) 33   Total Leak (L/Min) 22   Patient Trigger - ST Mode Only (%) 1   CPAP/BiPAP Alarms   High Pressure (cm H2O) 18   Low Pressure (cm H2O) 13   Low Pressure Delay (Sec) 20   Minute Ventilation (L/Min) 3   High RR (breaths/min) 40   Low RR (breaths/min) 6

## 2018-07-20 NOTE — ASSESSMENT & PLAN NOTE
- allowing for permissive htn to perfuse penumbra (220/110)  - will cautiously re-institute anti-htn slowly

## 2018-07-20 NOTE — ASSESSMENT & PLAN NOTE
- adequate glycemic control for now  - assessing nutrition needs --> may start low dosetefeeds cautiously    Lab Results   Component Value Date    HGBA1C 7.2 (H) 07/15/2018     Most recent fingerstick glucose reviewed-     Recent Labs  Lab 07/19/18  2210   POCTGLUCOSE 246*     Current correctional scale  Low  Maintain anti-hyperglycemic dose as follows-   Antihyperglycemics     Start     Stop Route Frequency Ordered    07/17/18 1744  insulin aspart U-100 pen 0-5 Units      -- SubQ As needed (PRN) 07/17/18 2324

## 2018-07-20 NOTE — SUBJECTIVE & OBJECTIVE
Review of Systems  Objective:     Vital Signs (Most Recent):  Temp: 99.6 °F (37.6 °C) (07/19/18 1536)  Pulse: 92 (07/19/18 1536)  Resp: 16 (07/19/18 1536)  BP: (!) 142/76 (07/19/18 1536)  SpO2: 96 % (07/19/18 1536) Vital Signs (24h Range):  Temp:  [98.8 °F (37.1 °C)-99.6 °F (37.6 °C)] 99.6 °F (37.6 °C)  Pulse:  [83-98] 92  Resp:  [16-24] 16  SpO2:  [95 %-99 %] 96 %  BP: (120-162)/(70-94) 142/76     Weight: 82.1 kg (181 lb)  Body mass index is 27.52 kg/m².    Intake/Output Summary (Last 24 hours) at 07/19/18 1917  Last data filed at 07/19/18 0807   Gross per 24 hour   Intake              380 ml   Output              350 ml   Net               30 ml      Physical Exam  General exam-lethargic, some resp distress; on NIPPV  HEENT-pupils sluggish but reactive.  Extraocular movements appear intact.  Oropharynx is clear and dry. nasogastric tube present in the patient's right nostril.  Neck exam-no JVD or thyromegaly  Cardiovascular-regular rate rhythm no murmurs gallops rubs  Respiratory-bilateral sonorous breath sounds noted. Decreased breath sounds noted particularly on the left side.  Increased work of breathing noted with subcostal retractions.  Abdomen-soft, nontender nondistended.  Bowel sounds are normoactive.  Extremities-no cyanosis, clubbing, edema.  Noted mild mottling on the left lower extremity.  Pulses are 2+ and symmetric.  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  Right emelia-neglect also noted. Patient gait was not assessed secondary to hemiplegia.    Significant Labs:   BMP:   Recent Labs  Lab 07/18/18  0445 07/19/18  0647   * 148*   * 151*   K 4.0 4.1   * 116*   CO2 21* 24   BUN 17 17   CREATININE 1.0 1.1   CALCIUM 8.6* 9.2   MG 2.0  --        Significant Imaging: I have reviewed all pertinent imaging results/findings within the past 24 hours.

## 2018-07-20 NOTE — ASSESSMENT & PLAN NOTE
- etiology likely unclear but potentially related to metabolic derangements and underlying neurologic disease  - will continue BiPAP for   - does have underlying COPD

## 2018-07-20 NOTE — PROGRESS NOTES
07/20/18 0750   Patient Assessment/Suction   Level of Consciousness (AVPU) responds to voice   PRE-TX-O2-ETCO2   O2 Device (Oxygen Therapy) BiPAP   $ Is the patient on Low Flow Oxygen? Yes   Oxygen Concentration (%) 40   SpO2 97 %   Pulse Oximetry Type Continuous   $ Pulse Oximetry - Multiple Charge Pulse Oximetry - Multiple   Pulse 92   Resp (!) 21   Ready to Wean/Extubation Screen   FIO2<=50 (chart decimal) 0.4   Preset CPAP/BiPAP Settings   Mode Of Delivery BiPAP   $ CPAP/BiPAP Daily Charge BiPAP/CPAP Daily   $ Is patient using? Yes   Size of Mask Large  (full face mask in use)   Sized Appropriately? Yes   Equipment Type V60   Airway Device Type total face mask   Ipap 18   EPAP (cm H2O) 5   Pressure Support (cm H2O) 13   Set Rate (Breaths/Min) 20   ITime (sec) 1   Rise Time (sec) 2   Patient CPAP/BiPAP Settings   RR Total (Breaths/Min) 21   Tidal Volume (mL) 1198   VE Minute Ventilation (L/min) 25.4 L/min   Peak Inspiratory Pressure (cm H2O) 18   TiTOT (%) 33   Total Leak (L/Min) 38   Patient Trigger - ST Mode Only (%) 18   CPAP/BiPAP Backup Settings   Backup Rate 20 breaths per minute (bpm)   CPAP/BiPAP Alarms   High Pressure (cm H2O) 25   Low Pressure (cm H2O) 10   Minute Ventilation (L/Min) 3   High RR (breaths/min) 50   Low RR (breaths/min) 10

## 2018-07-20 NOTE — ASSESSMENT & PLAN NOTE
- Na 151yesterday and trending up  - related to free H2O deficit from inability to swallow and drink  - will increase free H2O via NG tube and monitor

## 2018-07-20 NOTE — PLAN OF CARE
Received order to transfer patient to ICU due to need of 24 hour Bipap. Report given to Keo BRICE.

## 2018-07-20 NOTE — PROGRESS NOTES
[facility]  Adult Nutrition  Consult Note    SUMMARY     Recommendations    1) Enteral:    Bolus: Diabetasource AC: 6 1/2 cartons per day, from 6 am to 10 pm.  Progress to:  6 am, 2 cartons;  10 am, 1 1/2 cartons;  2 pm, 1 carton;  6 pm, 1 carton:  10 pm, 1 carton. Flush with 100 mls water with each feeding or per MD rec.  At goal provides: 1950 calories, 98 gms protein,  163 gms CHO,  1329 mls free water. (Flushes provide an additional 600 mls water)    Continuous Diabetasource AC @ 20 mls/hr.  Progress 10 mls/hr Q 6 hrs to goal 70 mls/hr or per pt tolerance. Flush with 30 mls water Q 4 hrs or per MD. Hold if residual >250 mls.  At goal provides 2016 calories, 101 gms protein, 168 gms CHO, 1374 mls free water.    2) Parenteral: Clinimix E 2.75/5 @ 45 mls/hr x 24 hrs with 20% lipids. Advance to goal 85 mls/hr with lipids.   At Goal Provides: 1071 calories, 56 gms protein, 2040 mls fluid, GIR 0.86 (55% EEN)    (If additional calories desired via TPN, consider central line)    2) When medically appropriate: ADAT to diabetic 2000 calorie, cardiac diet with texture per SLP.     Goals: 1) Pt will receive nutrition within 72 hrs. 2) Pt will progress to =>85% EEN within 48 hrs.   Nutrition Goal Status: 1)met 2) not met (current intake per chart review 30% EEN)  Communication of RD Recs:  (POC, discussed on rounds)    Reason for Assessment    Reason for Assessment: RD follow up  1. Thrombotic stroke    2. Stroke    3. Cerebrovascular accident (CVA) due to bilateral thrombosis of carotid arteries      Past Medical History:   Diagnosis Date    COPD (chronic obstructive pulmonary disease)     Depression     Diabetes mellitus     Hypertension     Stroke      Interdisciplinary Rounds: attended  General Information Comments: Admitted with rt sided weakness, slurred speech, AMS.  Pt not alert. Family/friend not at bedside.  Obtained information from chart and nursing.  NFPE incomplete.   7/18/18: Pt not  "alert. Family present. NFPE completed. No significant fat or muscle loss noted. Enteral feedings have been started, however, pt pulled the NGT out.  Bolus feeding rec provided.   18: Pt now on bipap. Enteral feedings are limited 2/2 bipap. See recs above.  Nutrition related renal labs are wnl. Energy needs adjusted accordingly.     Nutrition Risk Screen    Nutrition Risk Screen: other (see comments) (c/o stomach and sinus problems)    Nutrition/Diet History    Do you have any cultural, spiritual, Protestant conflicts, given your current situation?: None  Food Allergies: NKFA    Anthropometrics    Temp: (!) 100.8 °F (38.2 °C)  Height Method: Estimated  Height: 5' 8"  Height (inches): 68 in  Weight Method: Bed Scale  Weight: 82.1 kg (181 lb)  Weight (lb): 181 lb  Ideal Body Weight (IBW), Male: 154 lb  % Ideal Body Weight, Male (lb): 117.53 lb  BMI (Calculated): 27.6  BMI Grade: 25 - 29.9 - overweight  Usual Body Weight (UBW), k.6 kg (per chart review 18)  % Usual Body Weight: 100.82  % Weight Change From Usual Weight: 0.61 %       Lab/Procedures/Meds    Pertinent Labs Reviewed: reviewed  Lab Results   Component Value Date    ALBUMIN 2.8 (L) 2018     Lab Results   Component Value Date    WBC 10.80 2018     BMP  Lab Results   Component Value Date     (H) 2018    K 4.1 2018     (H) 2018    CO2 24 2018    BUN 17 2018    CREATININE 1.1 2018    CALCIUM 9.2 2018    ANIONGAP 11 2018    ESTGFRAFRICA >60 2018    EGFRNONAA >60 2018     Lab Results   Component Value Date    CALCIUM 9.2 2018    PHOS 3.2 2018     Pertinent Medications Reviewed: reviewed  Scheduled Meds:   aspirin  300 mg Rectal Daily    atorvastatin  40 mg Per NG tube QHS    busPIRone  5 mg Per NG tube Daily    cloNIDine 0.3 mg/24 hr td ptwk  1 patch Transdermal Q7 Days    clopidogrel  75 mg Per NG tube Daily    enoxaparin  40 mg Subcutaneous Daily "    metoprolol succinate  50 mg Oral Daily    paroxetine  40 mg Per NG tube QAM    polyethylene glycol  17 g Oral Daily    sodium chloride 0.9%  3 mL Intravenous Q8H    sodium chloride 0.9%  3 mL Intravenous Q8H    traZODone  150 mg Per NG tube QHS     Continuous Infusions:      Physical Findings/Assessment    Overall Physical Appearance:  (appears larger than BMI suggests)  Oral/Mouth Cavity: tooth/teeth missing  Skin:  (James score 13)    Estimated/Assessed Needs    Weight Used For Calorie Calculations: 82.1 kg (181 lb)  [facility]: 1561x1.25=1953        Weight Used For Protein Calculations: 69.9 kg (154 lb) (IBW)   1.2-2.0 gm/kg/day:      Fluid Need Method: RDA Method     CHO Requirement: 45-50% EEN or <5 GIR      Nutrition Prescription Ordered    Current Diet Order: NPO  Nutrition Order Comments: per SLP rec    Evaluation of Received Nutrient/Fluid Intake    Diabetasource bolus feeds: per chart review received 480 mls 7/19/18 from 6 am-10 pm.  Provides 576 calories, 29 gms protein, 48 gms CHO, 393 mls free water (30% EEN)    Energy Calories Required: not meeting needs  Protein Required: not meeting needs  Fluid Required:  (per primary team)  % Intake of Estimated Energy Needs: 25-50%  % Meal Intake: NPO    Nutrition Risk    Level of Risk/Frequency of Follow-up:  (2 x wkly)     Assessment and Plan    Inadequate energy intake r/t inability to consume sufficient energy as evidenced by NPO with no alternative nutrition      Monitor and Evaluation    Food and Nutrient Intake: energy intake  Food and Nutrient Adminstration: diet order  Anthropometric Measurements: weight, weight change  Biochemical Data, Medical Tests and Procedures: electrolyte and renal panel, glucose/endocrine profile, inflammatory profile  Nutrition-Focused Physical Findings: overall appearance, skin     Nutrition Follow-Up  2 x wkly    Discharge Plan    To be determined  RD Follow-up?: Yes

## 2018-07-20 NOTE — ASSESSMENT & PLAN NOTE
- etiology likely unclear but potentially related to metabolic derangements and underlying neurologic disease  - will continue BiPAP for now

## 2018-07-20 NOTE — PLAN OF CARE
Problem: Patient Care Overview  Goal: Individualization & Mutuality  1) Enteral:    Bolus: Diabetasource AC: 6 1/2 cartons per day, from 6 am to 10 pm.  Progress to:  6 am, 2 cartons;  10 am, 1 1/2 cartons;  2 pm, 1 carton;  6 pm, 1 carton:  10 pm, 1 carton. Flush with 100 mls water with each feeding or per MD rec.  At goal provides: 1950 calories, 98 gms protein,  163 gms CHO,  1329 mls free water. (Flushes provide an additional 600 mls water)    Continuous Diabetasource AC @ 20 mls/hr.  Progress 10 mls/hr Q 6 hrs to goal 70 mls/hr or per pt tolerance. Flush with 30 mls water Q 4 hrs or per MD. Hold if residual >250 mls.  At goal provides 2016 calories, 101 gms protein, 168 gms CHO, 1374 mls free water.    2) Parenteral: Clinimix E 2.75/5 @ 45 mls/hr x 24 hrs with 20% lipids. Advance to goal 85 mls/hr with lipids.   At Goal Provides: 1071 calories, 56 gms protein, 2040 mls fluid, GIR 0.86 (55% EEN)    (If additional calories desired via TPN, consider central line)    2) When medically appropriate: ADAT to diabetic 2000 calorie, cardiac diet with texture per SLP.     Goals: 1) Pt will receive nutrition within 72 hrs. 2) Pt will progress to =>85% EEN within 48 hrs.   Nutrition Goal Status: 1)met 2) not met (current intake per chart review 30% EEN)  Communication of RD Recs:  (POC, discussed on rounds)

## 2018-07-21 LAB
ANION GAP SERPL CALC-SCNC: 13 MMOL/L
BASOPHILS # BLD AUTO: 0 K/UL
BASOPHILS NFR BLD: 0 %
BUN SERPL-MCNC: 20 MG/DL
CALCIUM SERPL-MCNC: 9.2 MG/DL
CHLORIDE SERPL-SCNC: 109 MMOL/L
CO2 SERPL-SCNC: 24 MMOL/L
CREAT SERPL-MCNC: 1.1 MG/DL
DIFFERENTIAL METHOD: ABNORMAL
EOSINOPHIL # BLD AUTO: 0.2 K/UL
EOSINOPHIL NFR BLD: 1.6 %
ERYTHROCYTE [DISTWIDTH] IN BLOOD BY AUTOMATED COUNT: 14.1 %
EST. GFR  (AFRICAN AMERICAN): >60 ML/MIN/1.73 M^2
EST. GFR  (NON AFRICAN AMERICAN): >60 ML/MIN/1.73 M^2
GLUCOSE SERPL-MCNC: 177 MG/DL
HCT VFR BLD AUTO: 40.7 %
HGB BLD-MCNC: 13 G/DL
LYMPHOCYTES # BLD AUTO: 1.2 K/UL
LYMPHOCYTES NFR BLD: 8.3 %
MCH RBC QN AUTO: 29.9 PG
MCHC RBC AUTO-ENTMCNC: 32 G/DL
MCV RBC AUTO: 93 FL
MONOCYTES # BLD AUTO: 1 K/UL
MONOCYTES NFR BLD: 7.2 %
NEUTROPHILS # BLD AUTO: 11.8 K/UL
NEUTROPHILS NFR BLD: 82.9 %
PLATELET # BLD AUTO: 141 K/UL
PMV BLD AUTO: 10.4 FL
POCT GLUCOSE: 166 MG/DL (ref 70–110)
POCT GLUCOSE: 168 MG/DL (ref 70–110)
POCT GLUCOSE: 184 MG/DL (ref 70–110)
POTASSIUM SERPL-SCNC: 3.9 MMOL/L
RBC # BLD AUTO: 4.36 M/UL
SODIUM SERPL-SCNC: 146 MMOL/L
WBC # BLD AUTO: 14.3 K/UL

## 2018-07-21 PROCEDURE — 63600175 PHARM REV CODE 636 W HCPCS: Performed by: HOSPITALIST

## 2018-07-21 PROCEDURE — 80048 BASIC METABOLIC PNL TOTAL CA: CPT

## 2018-07-21 PROCEDURE — 27000221 HC OXYGEN, UP TO 24 HOURS

## 2018-07-21 PROCEDURE — A4216 STERILE WATER/SALINE, 10 ML: HCPCS | Performed by: INTERNAL MEDICINE

## 2018-07-21 PROCEDURE — 25000003 PHARM REV CODE 250: Performed by: INTERNAL MEDICINE

## 2018-07-21 PROCEDURE — 99233 SBSQ HOSP IP/OBS HIGH 50: CPT | Mod: ,,, | Performed by: PSYCHIATRY & NEUROLOGY

## 2018-07-21 PROCEDURE — 36415 COLL VENOUS BLD VENIPUNCTURE: CPT

## 2018-07-21 PROCEDURE — 25000003 PHARM REV CODE 250: Performed by: HOSPITALIST

## 2018-07-21 PROCEDURE — 20000000 HC ICU ROOM

## 2018-07-21 PROCEDURE — 94761 N-INVAS EAR/PLS OXIMETRY MLT: CPT

## 2018-07-21 PROCEDURE — 85025 COMPLETE CBC W/AUTO DIFF WBC: CPT

## 2018-07-21 RX ADMIN — BUSPIRONE HYDROCHLORIDE 5 MG: 5 TABLET ORAL at 09:07

## 2018-07-21 RX ADMIN — ASPIRIN 300 MG: 300 SUPPOSITORY RECTAL at 09:07

## 2018-07-21 RX ADMIN — POLYETHYLENE GLYCOL (3350) 17 G: 17 POWDER, FOR SOLUTION ORAL at 09:07

## 2018-07-21 RX ADMIN — CLOPIDOGREL BISULFATE 75 MG: 75 TABLET ORAL at 09:07

## 2018-07-21 RX ADMIN — SODIUM CHLORIDE, PRESERVATIVE FREE 3 ML: 5 INJECTION INTRAVENOUS at 10:07

## 2018-07-21 RX ADMIN — PAROXETINE HYDROCHLORIDE HEMIHYDRATE 40 MG: 20 TABLET, FILM COATED ORAL at 06:07

## 2018-07-21 RX ADMIN — SODIUM CHLORIDE, PRESERVATIVE FREE 3 ML: 5 INJECTION INTRAVENOUS at 06:07

## 2018-07-21 RX ADMIN — SODIUM CHLORIDE, PRESERVATIVE FREE: 5 INJECTION INTRAVENOUS at 02:07

## 2018-07-21 RX ADMIN — TRAZODONE HYDROCHLORIDE 150 MG: 50 TABLET ORAL at 08:07

## 2018-07-21 RX ADMIN — ENOXAPARIN SODIUM 40 MG: 100 INJECTION SUBCUTANEOUS at 06:07

## 2018-07-21 RX ADMIN — ATORVASTATIN CALCIUM 40 MG: 40 TABLET, FILM COATED ORAL at 08:07

## 2018-07-21 NOTE — ASSESSMENT & PLAN NOTE
- large territory bilateral CVA including L MCA territory  - numerous risk factors including HLD, htn, previous carotid disease, and smoking  - agree with Neuro, at risk for hemorrgaic conversion given size of infarction --> monitor and repeat imaging if abrupt Neuro change  - continue DAPT, statin  - BP stable at moment, but if SBP > 150 can introduce low-dose ACE-I  - low likelihood of any meaningful recovery --> extensive GoC discusison with family; to consider code status and future care

## 2018-07-21 NOTE — ASSESSMENT & PLAN NOTE
- etiology likely unclear but likely multifactorial and driven by underlying neurologic disease  - off BiPAP currently; can use prn and qHS  - does have underlying COPD

## 2018-07-21 NOTE — PROGRESS NOTES
07/21/18 0741   Patient Assessment/Suction   Level of Consciousness (AVPU) responds to voice   PRE-TX-O2-ETCO2   O2 Device (Oxygen Therapy) nasal cannula   $ Is the patient on Low Flow Oxygen? Yes   Flow (L/min) 5  (decreased to 3L)   SpO2 100 %   Pulse Oximetry Type Continuous   $ Pulse Oximetry - Multiple Charge Pulse Oximetry - Multiple   Pulse 93   Resp (!) 21   Preset CPAP/BiPAP Settings   Mode Of Delivery Standby

## 2018-07-21 NOTE — ASSESSMENT & PLAN NOTE
I have personally reviewed the MRI brain. Much of the stroke area is watershed between MCA/KAIT/PCA, however, there is also significant ischemic stroke of the left MCA territory. The MRA shows occlusion of the left carotid and right vertebral. The patient is at high risk for complications. I had an extensive discussion with his daughter today regarding the grim prognosis at this point. He is unable to swallow and will be unlikely to ever be able to live at home again/be independent. She states he would not want to live in a nursing home. I discussed hospice and comfort measures. She is continuing to think about these things and decide about PEG tube placement. Additional plan at this time is unchanged, continue statin and dual antiplatelet therapy.      I will sign off. Please don't hesitate to consult with questions or concerns.

## 2018-07-21 NOTE — NURSING
Daughter, Darshana, at bedside.  Spoke with her regarding code status. Request that pt be a DNR. Conversation witnessed by KIERRA Colón RN.   Informed Dr. Hawkins and DNR order obtained.

## 2018-07-21 NOTE — PROGRESS NOTES
[facility] Medicine  Progress Note    Patient Name: Kel Lock  MRN: [id number]  Patient Class: IP- Inpatient   Admission Date: 7/15/2018  Length of Stay: 6 days  Attending Physician: Avinash Hawkins MD  Primary Care Provider: [facility] Senait Asif        Subjective:     Principal Problem:Cerebrovascular accident (CVA) due to bilateral thrombosis of carotid arteries    HPI:  Mr. Lock is a 67 YOCM with PMH of HTN, DM, HLD, who was last seen by his ex-wife 3 days ago, normal , and apparently was found lying on floor unable to move himself back to the bed after he had a fall, heard by his roommate. The above statement was obtained from the medical record. Family at bed sided include his daughter, son in-law, ex wife, who have not witness any change in his clinical status.   He was presented to the [place] ER with right sided weakness, slurred speech and altered mental status. Telestroke was consulted who calculated his NIH value at 16. Initial CT head showed areas of moderate involutional changes consistent with microvascular ischemic changes with encephalomalacia watershed area of distribution, and subacute infarcts at right and left parieto-occipital cortex.   Pt was communicated with neurology, apparently who recommended MRI of the brain without contrast.   At the time of my interview with the patient, he denied CP, SOB or HA, problem with vision, but he clearly has left preference and right hemineglect. The pt is asking for water since he has touched the floor.       Hospital Course:  7/19: 66 y/o unfortunate  who has suffered a very large territory, bilateral CVA, including L MCA territory. Unable to perform MRI due to presence of ICD. Was not a candidate for tPA or endovascular therapy --> treating with statin, anti-htn, and DAPT. Does not appear to have made any meaningful clinical progress. Has flaccid paralysis of R side. Occasionally follows some minior  commands with fluctuating consciousness. Some resp distress requiring BiPAP. Long discussion at bedside with family about GoC.     7/20: Pt seen and examined. Moved to ICU overnight due to needs for BiPAP. More awake/alert this AM, but quite agitated. Spontaneous movement of L side, but remains with R flaccid paralysis. Hemodynamically otherwise stable at the moment.     7/21: No major changes overnight. Pt awakens to voice and responds to some commands, but remains with fluctuating consciousness. BP stable off anti-htn. Remains with R hemiparesis, dysphagia, dysarthria.     Review of Systems  Objective:     Vital Signs (Most Recent):  Temp: 97.9 °F (36.6 °C) (07/21/18 1200)  Pulse: 84 (07/21/18 1330)  Resp: (!) 26 (07/21/18 1330)  BP: 138/60 (07/21/18 1330)  SpO2: 100 % (07/21/18 1330) Vital Signs (24h Range):  Temp:  [97.9 °F (36.6 °C)-99.3 °F (37.4 °C)] 97.9 °F (36.6 °C)  Pulse:  [76-96] 84  Resp:  [18-32] 26  SpO2:  [92 %-100 %] 100 %  BP: (103-164)/(54-85) 138/60     Weight: 82.1 kg (181 lb)  Body mass index is 27.52 kg/m².    Intake/Output Summary (Last 24 hours) at 07/21/18 1400  Last data filed at 07/21/18 1210   Gross per 24 hour   Intake              290 ml   Output             1340 ml   Net            -1050 ml      Physical Exam  General exam-lethargic, some resp distress; on NIPPV  HEENT-pupils sluggish but reactive.  Extraocular movements appear intact.  Oropharynx is clear and dry. nasogastric tube present in the patient's right nostril.  Neck exam-no JVD or thyromegaly  Cardiovascular-regular rate rhythm no murmurs gallops rubs  Respiratory-bilateral sonorous breath sounds noted. Decreased breath sounds noted particularly on the left side.  Increased work of breathing noted with subcostal retractions.  Abdomen-soft, nontender nondistended.  Bowel sounds are normoactive.  Extremities-no cyanosis, clubbing, edema.  Noted mild mottling on the left lower extremity.  Pulses are 2+ and  symmetric.  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  Right emelia-neglect also noted. Patient gait was not assessed secondary to hemiplegia.    Significant Labs:   BMP:   Recent Labs  Lab 07/21/18  0333   *   *   K 3.9      CO2 24   BUN 20   CREATININE 1.1   CALCIUM 9.2         Assessment/Plan:      * Cerebrovascular accident (CVA) due to bilateral thrombosis of carotid arteries    - large territory bilateral CVA including L MCA territory  - numerous risk factors including HLD, htn, previous carotid disease, and smoking  - agree with Neuro, at risk for hemorrgaic conversion given size of infarction --> monitor and repeat imaging if abrupt Neuro change  - continue DAPT, statin  - BP stable at moment, but if SBP > 150 can introduce low-dose ACE-I  - low likelihood of any meaningful recovery --> extensive GoC discusison with family; to consider code status and future care        Type 2 diabetes mellitus    - adequate glycemic control for now  - assessing nutrition needs --> may start low dosetefeeds cautiously    Lab Results   Component Value Date    HGBA1C 7.2 (H) 07/15/2018     Most recent fingerstick glucose reviewed-     Recent Labs  Lab 07/20/18  1730 07/21/18  0052 07/21/18  0554   POCTGLUCOSE 209* 166* 184*     Current correctional scale  Low  Maintain anti-hyperglycemic dose as follows-   Antihyperglycemics     Start     Stop Route Frequency Ordered    07/17/18 1744  insulin aspart U-100 pen 0-5 Units      -- SubQ As needed (PRN) 07/17/18 1644                Essential hypertension    - as noted above, observe for now, but if consistently elevated, likely introduce ACE-I          Acute hypercapnic respiratory failure    - etiology likely unclear but likely multifactorial and driven by underlying neurologic disease  - off BiPAP currently; can use prn and qHS  - does have underlying COPD            Hypernatremia    - Na 151yesterday and trending up  -  related to free H2O deficit from inability to swallow and drink  - will increase free H2O via NG tube and monitor          Encephalopathy, metabolic    Multiple potential underlying etiologies, with neurologic(CVA) and metabolic derangements being most common and potential hypercarbia as well.  Will continue to monitor mental status closely.          Malnutrition of moderate degree    NGT inserted. Continue feeds. May need PEG if unable to swallow.          GRACIE (acute kidney injury)     Patient with GRACIE likely d/t IVVD  Which is currently improving. Labs reviewed- BMP with Estimated Creatinine Clearance: 72.9 mL/min (based on SCr of 1 mg/dL). according to latest data. Monitor UOP and serial BMP and adjust therapy as needed. Renally dose meds for GFR listed above.            HLD (hyperlipidemia)     Patient is chronically on statin. Will continue for now. Monitor clinically. Last LDL was   Lab Results   Component Value Date    LDLCALC 63.8 07/15/2018                  VTE Risk Mitigation         Ordered     enoxaparin injection 40 mg  Daily      07/17/18 0938     IP VTE HIGH RISK PATIENT  Once      07/15/18 1152          Critical care time spent on the evaluation and treatment of severe organ dysfunction, review of pertinent labs and imaging studies, discussions with consulting providers and discussions with patient/family: 40 minutes.    Avinash Hawkins MD  Department of Hospital Medicine   [facility]

## 2018-07-21 NOTE — PLAN OF CARE
Problem: Patient Care Overview  Goal: Plan of Care Review  Ensured patient safety with frequent checks, assessing pain and neuro checks. Patient remains hemiplegic to the right arm and leg. Can follow commands intermittently with the left side. Patient remains with one restraint on the left arm to prevent pulling lines. Patient remain with NGT to right nare, currently infusing 30 ml diabetasource. One piv intact, sliv. Patient remains free of falls and skin with multiple skin tears that are covered, he had one loose bm on this shift and remains in a brief. Noted to have a cough but remains non-productive. Extensive oral care done this shift. Will continue to monitor.

## 2018-07-21 NOTE — PROGRESS NOTES
[facility]  Neurology  Progress Note    Patient Name: Kel Lock  MRN: [id number]  Admission Date: 7/15/2018  Hospital Length of Stay: 6 days  Code Status: Full Code   Attending Provider: Avinash Hawkins MD  Primary Care Physician: [facility] Senait Asif   Principal Problem:Cerebrovascular accident (CVA) due to bilateral thrombosis of carotid arteries      Subjective:     Interval History: He has been off BiPAP today. No neurologic improvement. Continues to be sleepy. No movement of right side. Discussed goals of care with daughter at bedside.     Current Facility-Administered Medications   Medication Dose Route Frequency Provider Last Rate Last Dose    aspirin suppository 300 mg  300 mg Rectal Daily Naseem Rutledge MD   300 mg at 07/21/18 0923    atorvastatin tablet 40 mg  40 mg Per NG tube QHS Carrington Garcia MD   40 mg at 07/20/18 2159    busPIRone tablet 5 mg  5 mg Per NG tube Daily Carrington Garcia MD   5 mg at 07/21/18 0924    cloNIDine 0.3 mg/24 hr td ptwk 1 patch  1 patch Transdermal Q7 Days Carrington Garcia MD   Stopped at 07/18/18 0900    clopidogrel tablet 75 mg  75 mg Per NG tube Daily Carrington Garcia MD   75 mg at 07/21/18 0924    dextrose 50% injection 12.5 g  12.5 g Intravenous PRN Carrington Garcia MD        enoxaparin injection 40 mg  40 mg Subcutaneous Daily Carrington Garcia MD   40 mg at 07/20/18 1616    glucagon (human recombinant) injection 1 mg  1 mg Intramuscular PRN Carrington Garcia MD        insulin aspart U-100 pen 0-5 Units  0-5 Units Subcutaneous PRN Carrington Garcia MD   1 Units at 07/20/18 1731    metoprolol injection 5 mg  5 mg Intravenous Q5 Min PRN Naseem Rutledge MD        metoprolol succinate (TOPROL-XL) 24 hr tablet 50 mg  50 mg Oral Daily Carrington Garcia MD   Stopped at 07/21/18 0900    paroxetine tablet 40 mg  40 mg Per NG tube QAM Carrington Garcia MD   40 mg at 07/21/18 0623    pneumoc 13-sherif conj-dip cr(PF) 0.5 mL  0.5 mL Intramuscular vaccine x 1  dose Naseem Rutledge MD        polyethylene glycol packet 17 g  17 g Oral Daily Naseem Rutledge MD   17 g at 07/21/18 0924    sodium chloride 0.9% flush 3 mL  3 mL Intravenous Q8H Naseem Rutledge MD   3 mL at 07/20/18 0653    sodium chloride 0.9% flush 3 mL  3 mL Intravenous Q8H Naseem Rutledge MD   3 mL at 07/21/18 0623    sulfur hexafluoride microspheres injection 2.4 mL  2.4 mL Intravenous ONCE PRN Jf Tapia MD        traZODone tablet 150 mg  150 mg Per NG tube QHS Carrington Garcia MD   150 mg at 07/20/18 2151       Review of Systems   Unable to perform ROS: Mental status change     Objective:     Vital Signs (Most Recent):  Temp: 97.9 °F (36.6 °C) (07/21/18 1200)  Pulse: 84 (07/21/18 1330)  Resp: (!) 26 (07/21/18 1330)  BP: 138/60 (07/21/18 1330)  SpO2: 100 % (07/21/18 1330) Vital Signs (24h Range):  Temp:  [97.9 °F (36.6 °C)-99.3 °F (37.4 °C)] 97.9 °F (36.6 °C)  Pulse:  [76-96] 84  Resp:  [18-32] 26  SpO2:  [92 %-100 %] 100 %  BP: (103-164)/(54-85) 138/60     Weight: 82.1 kg (181 lb)  Body mass index is 27.52 kg/m².    Physical Exam     Patient not able to hold conversation. Attempted to discuss if he would want a PEG tube and he is unable to accurately answer my questions. No movement on right side. Spontaneous movements on left.     Significant Labs: All pertinent lab results from the past 24 hours have been reviewed.    Significant Imaging: I have reviewed and interpreted all pertinent imaging results/findings within the past 24 hours.    Assessment and Plan:     * Cerebrovascular accident (CVA) due to bilateral thrombosis of carotid arteries    I have personally reviewed the MRI brain. Much of the stroke area is watershed between MCA/KAIT/PCA, however, there is also significant ischemic stroke of the left MCA territory. The MRA shows occlusion of the left carotid and right vertebral. The patient is at high risk for complications. I had an extensive discussion with his daughter today regarding the grim prognosis  at this point. He is unable to swallow and will be unlikely to ever be able to live at home again/be independent. She states he would not want to live in a nursing home. I discussed hospice and comfort measures. She is continuing to think about these things and decide about PEG tube placement. Additional plan at this time is unchanged, continue statin and dual antiplatelet therapy.      I will sign off. Please don't hesitate to consult with questions or concerns.         Acute hypercapnic respiratory failure    Currently off BiPAP        Malnutrition of moderate degree    NG tube in place, need to think about PEG placement vs comfort care        GRACIE (acute kidney injury)    Primary team monitoring renal function        HLD (hyperlipidemia)     continue home statin        Essential hypertension      avoid hypotension        Type 2 diabetes mellitus     Glucose control per primary team            VTE Risk Mitigation         Ordered     enoxaparin injection 40 mg  Daily      07/17/18 0938     IP VTE HIGH RISK PATIENT  Once      07/15/18 1152          Soco Laureano MD  Neurology  [facility]

## 2018-07-21 NOTE — PROGRESS NOTES
07/21/18 0810   Patient Assessment/Suction   Level of Consciousness (AVPU) responds to voice   PRE-TX-O2-ETCO2   O2 Device (Oxygen Therapy) nasal cannula   $ Is the patient on Low Flow Oxygen? Yes   Flow (L/min) 3   SpO2 99 %   Pulse Oximetry Type Continuous   Pulse 89   Resp (!) 21

## 2018-07-21 NOTE — ASSESSMENT & PLAN NOTE
- adequate glycemic control for now  - assessing nutrition needs --> may start low dosetefeeds cautiously    Lab Results   Component Value Date    HGBA1C 7.2 (H) 07/15/2018     Most recent fingerstick glucose reviewed-     Recent Labs  Lab 07/20/18  1730 07/21/18  0052 07/21/18  0554   POCTGLUCOSE 209* 166* 184*     Current correctional scale  Low  Maintain anti-hyperglycemic dose as follows-   Antihyperglycemics     Start     Stop Route Frequency Ordered    07/17/18 1744  insulin aspart U-100 pen 0-5 Units      -- SubQ As needed (PRN) 07/17/18 4544

## 2018-07-21 NOTE — SUBJECTIVE & OBJECTIVE
Review of Systems  Objective:     Vital Signs (Most Recent):  Temp: 97.9 °F (36.6 °C) (07/21/18 1200)  Pulse: 84 (07/21/18 1330)  Resp: (!) 26 (07/21/18 1330)  BP: 138/60 (07/21/18 1330)  SpO2: 100 % (07/21/18 1330) Vital Signs (24h Range):  Temp:  [97.9 °F (36.6 °C)-99.3 °F (37.4 °C)] 97.9 °F (36.6 °C)  Pulse:  [76-96] 84  Resp:  [18-32] 26  SpO2:  [92 %-100 %] 100 %  BP: (103-164)/(54-85) 138/60     Weight: 82.1 kg (181 lb)  Body mass index is 27.52 kg/m².    Intake/Output Summary (Last 24 hours) at 07/21/18 1400  Last data filed at 07/21/18 1210   Gross per 24 hour   Intake              290 ml   Output             1340 ml   Net            -1050 ml      Physical Exam  General exam-lethargic, some resp distress; on NIPPV  HEENT-pupils sluggish but reactive.  Extraocular movements appear intact.  Oropharynx is clear and dry. nasogastric tube present in the patient's right nostril.  Neck exam-no JVD or thyromegaly  Cardiovascular-regular rate rhythm no murmurs gallops rubs  Respiratory-bilateral sonorous breath sounds noted. Decreased breath sounds noted particularly on the left side.  Increased work of breathing noted with subcostal retractions.  Abdomen-soft, nontender nondistended.  Bowel sounds are normoactive.  Extremities-no cyanosis, clubbing, edema.  Noted mild mottling on the left lower extremity.  Pulses are 2+ and symmetric.  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  Right emelia-neglect also noted. Patient gait was not assessed secondary to hemiplegia.    Significant Labs:   BMP:   Recent Labs  Lab 07/21/18  0333   *   *   K 3.9      CO2 24   BUN 20   CREATININE 1.1   CALCIUM 9.2

## 2018-07-22 LAB
POCT GLUCOSE: 163 MG/DL (ref 70–110)
POCT GLUCOSE: 182 MG/DL (ref 70–110)
POCT GLUCOSE: 230 MG/DL (ref 70–110)

## 2018-07-22 PROCEDURE — 87040 BLOOD CULTURE FOR BACTERIA: CPT

## 2018-07-22 PROCEDURE — 20000000 HC ICU ROOM

## 2018-07-22 PROCEDURE — 94761 N-INVAS EAR/PLS OXIMETRY MLT: CPT

## 2018-07-22 PROCEDURE — 25000003 PHARM REV CODE 250: Performed by: INTERNAL MEDICINE

## 2018-07-22 PROCEDURE — 25000003 PHARM REV CODE 250

## 2018-07-22 PROCEDURE — 97110 THERAPEUTIC EXERCISES: CPT

## 2018-07-22 PROCEDURE — 25000003 PHARM REV CODE 250: Performed by: HOSPITALIST

## 2018-07-22 PROCEDURE — 63600175 PHARM REV CODE 636 W HCPCS: Performed by: INTERNAL MEDICINE

## 2018-07-22 PROCEDURE — 63600175 PHARM REV CODE 636 W HCPCS: Performed by: HOSPITALIST

## 2018-07-22 PROCEDURE — 36415 COLL VENOUS BLD VENIPUNCTURE: CPT

## 2018-07-22 PROCEDURE — 99900035 HC TECH TIME PER 15 MIN (STAT)

## 2018-07-22 PROCEDURE — A4216 STERILE WATER/SALINE, 10 ML: HCPCS | Performed by: INTERNAL MEDICINE

## 2018-07-22 PROCEDURE — 27000221 HC OXYGEN, UP TO 24 HOURS

## 2018-07-22 PROCEDURE — 94660 CPAP INITIATION&MGMT: CPT

## 2018-07-22 RX ORDER — ACETAMINOPHEN 650 MG/20.3ML
LIQUID ORAL
Status: COMPLETED
Start: 2018-07-22 | End: 2018-07-22

## 2018-07-22 RX ORDER — SODIUM CHLORIDE 9 MG/ML
INJECTION, SOLUTION INTRAVENOUS ONCE
Status: COMPLETED | OUTPATIENT
Start: 2018-07-22 | End: 2018-07-22

## 2018-07-22 RX ORDER — ACETAMINOPHEN 650 MG/20.3ML
650 LIQUID ORAL EVERY 6 HOURS PRN
Status: DISCONTINUED | OUTPATIENT
Start: 2018-07-22 | End: 2018-07-27

## 2018-07-22 RX ADMIN — ASPIRIN 300 MG: 300 SUPPOSITORY RECTAL at 10:07

## 2018-07-22 RX ADMIN — POLYETHYLENE GLYCOL (3350) 17 G: 17 POWDER, FOR SOLUTION ORAL at 10:07

## 2018-07-22 RX ADMIN — ATORVASTATIN CALCIUM 40 MG: 40 TABLET, FILM COATED ORAL at 08:07

## 2018-07-22 RX ADMIN — SODIUM CHLORIDE, PRESERVATIVE FREE 3 ML: 5 INJECTION INTRAVENOUS at 10:07

## 2018-07-22 RX ADMIN — PAROXETINE HYDROCHLORIDE HEMIHYDRATE 40 MG: 20 TABLET, FILM COATED ORAL at 06:07

## 2018-07-22 RX ADMIN — BUSPIRONE HYDROCHLORIDE 5 MG: 5 TABLET ORAL at 10:07

## 2018-07-22 RX ADMIN — CLOPIDOGREL BISULFATE 75 MG: 75 TABLET ORAL at 10:07

## 2018-07-22 RX ADMIN — ACETAMINOPHEN 650 MG: 650 SOLUTION ORAL at 02:07

## 2018-07-22 RX ADMIN — ENOXAPARIN SODIUM 40 MG: 100 INJECTION SUBCUTANEOUS at 06:07

## 2018-07-22 RX ADMIN — ACETAMINOPHEN 650 MG: 650 SOLUTION ORAL at 08:07

## 2018-07-22 RX ADMIN — INSULIN ASPART 2 UNITS: 100 INJECTION, SOLUTION INTRAVENOUS; SUBCUTANEOUS at 06:07

## 2018-07-22 RX ADMIN — SODIUM CHLORIDE, PRESERVATIVE FREE 3 ML: 5 INJECTION INTRAVENOUS at 02:07

## 2018-07-22 RX ADMIN — SODIUM CHLORIDE, PRESERVATIVE FREE 3 ML: 5 INJECTION INTRAVENOUS at 03:07

## 2018-07-22 RX ADMIN — PIPERACILLIN SODIUM AND TAZOBACTAM SODIUM 4.5 G: 4; .5 INJECTION, POWDER, FOR SOLUTION INTRAVENOUS at 07:07

## 2018-07-22 RX ADMIN — SODIUM CHLORIDE: 0.9 INJECTION, SOLUTION INTRAVENOUS at 04:07

## 2018-07-22 RX ADMIN — TRAZODONE HYDROCHLORIDE 150 MG: 50 TABLET ORAL at 08:07

## 2018-07-22 NOTE — ASSESSMENT & PLAN NOTE
- large territory bilateral CVA including L MCA territory  - numerous risk factors including HLD, htn, previous carotid disease, and smoking  - agree with Neuro, at risk for hemorrgaic conversion given size of infarction --> monitor and repeat imaging if abrupt Neuro change  - continue DAPT, statin  - BP stable at moment, but if SBP > 150 can introduce low-dose ACE-I  - low likelihood of meaningful recovery --> extensive GoC discusison with family;now DNR and contemplating future care

## 2018-07-22 NOTE — PROGRESS NOTES
Patient on 5lnc, bipap on stand by at bedside.     07/22/18 1524   PRE-TX-O2-ETCO2   O2 Device (Oxygen Therapy) nasal cannula   $ Is the patient on Low Flow Oxygen? Yes   Flow (L/min) 5   Oxygen Concentration (%) 40   SpO2 97 %   Pulse Oximetry Type Continuous   $ Pulse Oximetry - Multiple Charge Pulse Oximetry - Multiple   Pulse 92   Resp (!) 22   Ready to Wean/Extubation Screen   FIO2<=50 (chart decimal) 0.4   Preset CPAP/BiPAP Settings   Mode Of Delivery Standby

## 2018-07-22 NOTE — NURSING
Respirations unlabored. O2 sats 88-90% on 5 L NC. (titrated up from 3L).  Placed on Bipap, 40%.  O2 sats improved to 95-96%.  Continue to monitor.

## 2018-07-22 NOTE — PROGRESS NOTES
[facility] Hospital Medicine  Progress Note    Patient Name: Kel Lock  MRN: [id number]  Patient Class: IP- Inpatient   Admission Date: 7/15/2018  Length of Stay: 7 days  Attending Physician: Avinash Hawkins MD  Primary Care Provider: [facility] Senait Asif        Subjective:     Principal Problem:Cerebrovascular accident (CVA) due to bilateral thrombosis of carotid arteries    HPI:  Mr. Lock is a 67 YOCM with PMH of HTN, DM, HLD, who was last seen by his ex-wife 3 days ago, normal , and apparently was found lying on floor unable to move himself back to the bed after he had a fall, heard by his roommate. The above statement was obtained from the medical record. Family at bed sided include his daughter, son in-law, ex wife, who have not witness any change in his clinical status.   He was presented to the [place] ER with right sided weakness, slurred speech and altered mental status. Telestroke was consulted who calculated his NIH value at 16. Initial CT head showed areas of moderate involutional changes consistent with microvascular ischemic changes with encephalomalacia watershed area of distribution, and subacute infarcts at right and left parieto-occipital cortex.   Pt was communicated with neurology, apparently who recommended MRI of the brain without contrast.   At the time of my interview with the patient, he denied CP, SOB or HA, problem with vision, but he clearly has left preference and right hemineglect. The pt is asking for water since he has touched the floor.       Hospital Course:  7/19: 68 y/o unfortunate  who has suffered a very large territory, bilateral CVA, including L MCA territory. Unable to perform MRI due to presence of ICD. Was not a candidate for tPA or endovascular therapy --> treating with statin, anti-htn, and DAPT. Does not appear to have made any meaningful clinical progress. Has flaccid paralysis of R side. Occasionally follows some minior  commands with fluctuating consciousness. Some resp distress requiring BiPAP. Long discussion at bedside with family about GoC.     7/20: Pt seen and examined. Moved to ICU overnight due to needs for BiPAP. More awake/alert this AM, but quite agitated. Spontaneous movement of L side, but remains with R flaccid paralysis. Hemodynamically otherwise stable at the moment.     7/21: No major changes overnight. Pt awakens to voice and responds to some commands, but remains with fluctuating consciousness. BP stable off anti-htn. Remains with R hemiparesis, dysphagia, dysarthria.     7/22: Pt seen and examined. Had some desaturation and mod resp distress earlier --> back on BiPAP currently with improved O2 sat. Did have some fever as well. Tolerating tube feeds at present. Now DNR.    Review of Systems  Objective:     Vital Signs (Most Recent):  Temp: 99.7 °F (37.6 °C) (07/22/18 1200)  Pulse: 98 (07/22/18 1200)  Resp: 20 (07/22/18 1200)  BP: 102/63 (07/22/18 1200)  SpO2: 95 % (07/22/18 1200) Vital Signs (24h Range):  Temp:  [97.5 °F (36.4 °C)-100.3 °F (37.9 °C)] 99.7 °F (37.6 °C)  Pulse:  [] 98  Resp:  [16-34] 20  SpO2:  [90 %-100 %] 95 %  BP: ()/(55-83) 102/63     Weight: 84.6 kg (186 lb 8.2 oz)  Body mass index is 28.36 kg/m².    Intake/Output Summary (Last 24 hours) at 07/22/18 1353  Last data filed at 07/22/18 1110   Gross per 24 hour   Intake             1290 ml   Output              415 ml   Net              875 ml      Physical Exam  General exam-lethargic, some resp distress; on NIPPV  HEENT-pupils sluggish but reactive.  Extraocular movements appear intact.  Oropharynx is clear and dry. nasogastric tube present in the patient's right nostril; back on BiPAP  Neck exam-no JVD or thyromegaly  Cardiovascular-regular rate rhythm no murmurs gallops rubs  Respiratory-bilateral sonorous breath sounds noted. Decreased breath sounds noted particularly on the left side.  Increased work of breathing noted with  subcostal retractions.  Abdomen-soft, nontender nondistended.  Bowel sounds are normoactive.  Extremities-no cyanosis, clubbing, edema.  Noted mild mottling on the left lower extremity.  Pulses are 2+ and symmetric.  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  Right emelia-neglect also noted. Patient gait was not assessed secondary to hemiplegia.    Significant Labs:   BMP:   Recent Labs  Lab 07/21/18  0333   *   *   K 3.9      CO2 24   BUN 20   CREATININE 1.1   CALCIUM 9.2       Significant Imaging: I have reviewed all pertinent imaging results/findings within the past 24 hours.    Assessment/Plan:      * Cerebrovascular accident (CVA) due to bilateral thrombosis of carotid arteries    - large territory bilateral CVA including L MCA territory  - numerous risk factors including HLD, htn, previous carotid disease, and smoking  - agree with Neuro, at risk for hemorrgaic conversion given size of infarction --> monitor and repeat imaging if abrupt Neuro change  - continue DAPT, statin  - BP stable at moment, but if SBP > 150 can introduce low-dose ACE-I  - low likelihood of meaningful recovery --> extensive GoC discusison with family;now DNR and contemplating future care        Type 2 diabetes mellitus    - adequate glycemic control for now  - continue enteral feeds    Lab Results   Component Value Date    HGBA1C 7.2 (H) 07/15/2018     Most recent fingerstick glucose reviewed-     Recent Labs  Lab 07/21/18  1815 07/22/18  0030 07/22/18  1235   POCTGLUCOSE 168* 163* 182*     Current correctional scale  Low  Maintain anti-hyperglycemic dose as follows-   Antihyperglycemics     Start     Stop Route Frequency Ordered    07/17/18 1744  insulin aspart U-100 pen 0-5 Units      -- SubQ As needed (PRN) 07/17/18 1644                Essential hypertension    - now actually with relative hypotension  - observe and can provide volume if needed        Acute hypercapnic  respiratory failure    - etiology likely unclear but likely multifactorial and driven by underlying neurologic disease  - back on BiPAP at present --> improved O2 sats and tolerates well  - does have underlying COPD            Hypernatremia    - Na 151yesterday and trending up  - related to free H2O deficit from inability to swallow and drink  - will increase free H2O via NG tube and monitor          Encephalopathy, metabolic    Multiple potential underlying etiologies, with neurologic(CVA) and metabolic derangements being most common and potential hypercarbia as well.  Will continue to monitor mental status closely.          Malnutrition of moderate degree    NGT inserted. Continue feeds. May need PEG if unable to swallow.          GRACIE (acute kidney injury)     Patient with GRACIE likely d/t IVVD  Which is currently improving. Labs reviewed- BMP with Estimated Creatinine Clearance: 72.9 mL/min (based on SCr of 1 mg/dL). according to latest data. Monitor UOP and serial BMP and adjust therapy as needed. Renally dose meds for GFR listed above.            HLD (hyperlipidemia)     Patient is chronically on statin. Will continue for now. Monitor clinically. Last LDL was   Lab Results   Component Value Date    LDLCALC 63.8 07/15/2018                  VTE Risk Mitigation         Ordered     enoxaparin injection 40 mg  Daily      07/17/18 0938     IP VTE HIGH RISK PATIENT  Once      07/15/18 1152          Critical care time spent on the evaluation and treatment of severe organ dysfunction, review of pertinent labs and imaging studies, discussions with consulting providers and discussions with patient/family: 45 minutes.    Avinash Hawkins MD  Department of Hospital Medicine   [facility]

## 2018-07-22 NOTE — NURSING
Appears comfortable on Bipap.  Skin feels hot to touch. Rechecked temp-100.9 axillary.  Message left for Dr. Hawkins to return call.

## 2018-07-22 NOTE — ASSESSMENT & PLAN NOTE
- adequate glycemic control for now  - continue enteral feeds    Lab Results   Component Value Date    HGBA1C 7.2 (H) 07/15/2018     Most recent fingerstick glucose reviewed-     Recent Labs  Lab 07/21/18  1815 07/22/18  0030 07/22/18  1235   POCTGLUCOSE 168* 163* 182*     Current correctional scale  Low  Maintain anti-hyperglycemic dose as follows-   Antihyperglycemics     Start     Stop Route Frequency Ordered    07/17/18 1744  insulin aspart U-100 pen 0-5 Units      -- SubQ As needed (PRN) 07/17/18 1647

## 2018-07-22 NOTE — PT/OT/SLP PROGRESS
Physical Therapy  Treatment    Kel Lock   MRN: [id number]   Admitting Diagnosis: Cerebrovascular accident (CVA) due to bilateral thrombosis of carotid arteries    PT Received On: 07/21/18  PT Start Time: 0935     PT Stop Time: 0945    PT Total Time (min): 10 min       Billable Minutes:  10 min . There-ex     Treatment Type: Treatment  PT/PTA: PT     PTA Visit Number: 0       General Precautions: Standard, NPO, fall  Orthopedic Precautions: N/A   Braces: N/A    Do you have any cultural, spiritual, Sikh conflicts, given your current situation?: none reported     Subjective:  Communicated with [facility] Celaya prior to session.  Pt. Agreed to have PT     Pain/Comfort  Pain Rating 1: 0/10  Pain Rating Post-Intervention 1: 0/10    Objective:   Patient found with: restraints, BIPAP, telemetry, SCD    Functional Mobility:  Bed Mobility:    MAX/ TOTAL A     Transfers: NA       Gait:  NA       Stairs:  NA    Balance: NA      Therapeutic Activities and Exercises:  L LE AROM with MREs, R LE /UE PROM     AM-PAC 6 CLICK MOBILITY  How much help from another person does this patient currently need?   1 = Unable, Total/Dependent Assistance  2 = A lot, Maximum/Moderate Assistance  3 = A little, Minimum/Contact Guard/Supervision  4 = None, Modified [place]/Independent    Turning over in bed (including adjusting bedclothes, sheets and blankets)?: 2  Sitting down on and standing up from a chair with arms (e.g., wheelchair, bedside commode, etc.): 1  Moving from lying on back to sitting on the side of the bed?: 2  Moving to and from a bed to a chair (including a wheelchair)?: 1  Need to walk in hospital room?: 1  Climbing 3-5 steps with a railing?: 1  Basic Mobility Total Score: 8    AM-PAC Raw Score CMS G-Code Modifier Level of Impairment Assistance   6 % Total / Unable   7 - 9 CM 80 - 100% Maximal Assist   10 - 14 CL 60 - 80% Moderate Assist   15 - 19 CK 40 - 60% Moderate Assist   20 - 22 CJ 20 - 40% Minimal Assist   23 CI  1-20% SBA / CGA   24 CH 0% Independent/ Mod I     Patient left supine with all lines intact, call button in reach and Rn notified.    Assessment:  Kel Lock is a 67 y.o. male with a medical diagnosis of Cerebrovascular accident (CVA) due to bilateral thrombosis of carotid arteries and presents with R sided hemiparesis.    Rehab identified problem list/impairments: Rehab identified problem list/impairments: weakness, impaired endurance, impaired sensation, impaired self care skills, impaired functional mobilty, gait instability, impaired balance, impaired cognition, decreased lower extremity function, decreased upper extremity function, decreased ROM, impaired cardiopulmonary response to activity, impaired joint extensibility, impaired muscle length    Rehab potential is fair.    Activity tolerance: Fair    Discharge recommendations: Discharge Facility/Level Of Care Needs: nursing facility, skilled, rehabilitation facility     Barriers to discharge:      Equipment recommendations: Equipment Needed After Discharge: none     GOALS:    Physical Therapy Goals        Problem: Physical Therapy Goal    Goal Priority Disciplines Outcome Goal Variances Interventions   Physical Therapy Goal     PT/OT, PT Ongoing (interventions implemented as appropriate)     Description:  Goals to be met by: 08/10/2018     Patient will increase functional independence with mobility by performin. Pt will be able to tolerate Supine.  2. Pt will be able to tolerate Sit to supine  3. Pt will be able to tolerate Sit to stand transfer   4. Pt will be able to tolerate Bed to chair transfer with most appropriate device.  5. Pt will be able to tolerate Gait  Activity using most appropriate device.     Goal edited 2018: Goals to be met 2018.    1. Pt will be able to tolerate supine<>sit mod A.  2. Pt will perform supine BLE exercises (actively/passively) x 10-20 as tolerated by patient.   3. Pt will tolerate sit<>stand, stand pivot  transfers max A.                        PLAN:    Patient to be seen 6 x/week  to address the above listed problems via gait training, therapeutic activities, therapeutic exercises, neuromuscular re-education  Plan of Care expires: 08/24/18  Plan of Care reviewed with: patient         Darrel Cameronan, PT  07/22/2018

## 2018-07-22 NOTE — PROGRESS NOTES
Dr Hawkins notified of bp readings after treatment of elevated temperature. New orders received for fluid bolus, blood cultures and then start IV antibiotics.

## 2018-07-22 NOTE — PT/OT/SLP PROGRESS
Physical Therapy  Treatment    Kel Lock   MRN: [id number]   Admitting Diagnosis: Cerebrovascular accident (CVA) due to bilateral thrombosis of carotid arteries    PT Received On: 07/22/18  PT Start Time: 1010     PT Stop Time: 1023    PT Total Time (min): 13 min       Billable Minutes:  There-ex 13 min.     Treatment Type: Treatment  PT/PTA: PT     PTA Visit Number: 0       General Precautions: Standard, NPO, fall  Orthopedic Precautions: N/A   Braces: N/A    Do you have any cultural, spiritual, Congregation conflicts, given your current situation?: none reported     Subjective:  Communicated with [facility] Celaya prior to session.  Pt. Agreed to do PT    Pain/Comfort  Pain Rating 1: 0/10  Pain Rating Post-Intervention 1: 0/10    Objective:   Patient found with: BIPAP, restraints, SCD    Functional Mobility:  Bed Mobility:    Total A    Transfers:     NA  Gait:    NA    Stairs:  NA    Balance: NA    Therapeutic Activities and Exercises:  AAROM/AROM/PROM B LE , R UE     AM-PAC 6 CLICK MOBILITY  How much help from another person does this patient currently need?   1 = Unable, Total/Dependent Assistance  2 = A lot, Maximum/Moderate Assistance  3 = A little, Minimum/Contact Guard/Supervision  4 = None, Modified [place]/Independent    Turning over in bed (including adjusting bedclothes, sheets and blankets)?: 2  Sitting down on and standing up from a chair with arms (e.g., wheelchair, bedside commode, etc.): 1  Moving from lying on back to sitting on the side of the bed?: 2  Moving to and from a bed to a chair (including a wheelchair)?: 1  Need to walk in hospital room?: 1  Climbing 3-5 steps with a railing?: 1  Basic Mobility Total Score: 8    AM-PAC Raw Score CMS G-Code Modifier Level of Impairment Assistance   6 % Total / Unable   7 - 9 CM 80 - 100% Maximal Assist   10 - 14 CL 60 - 80% Moderate Assist   15 - 19 CK 40 - 60% Moderate Assist   20 - 22 CJ 20 - 40% Minimal Assist   23 CI 1-20% SBA / CGA   24 CH 0%  Independent/ Mod I     Patient left supine  with all lines intact, call button in reach and RN notified.    Assessment:  Kel Lock is a 67 y.o. male with a medical diagnosis of Cerebrovascular accident (CVA) due to bilateral thrombosis of carotid arteries and presents with R sided hemiparesis .    Rehab identified problem list/impairments: Rehab identified problem list/impairments: weakness, impaired endurance, impaired sensation, impaired self care skills, impaired functional mobilty, gait instability, impaired balance, visual deficits, impaired cognition, decreased upper extremity function, decreased lower extremity function, decreased safety awareness, abnormal tone, decreased ROM, impaired coordination, impaired fine motor    Rehab potential is fair.    Activity tolerance: Fair    Discharge recommendations: Discharge Facility/Level Of Care Needs: nursing facility, skilled, rehabilitation facility     Barriers to discharge:      Equipment recommendations: Equipment Needed After Discharge: none     GOALS:    Physical Therapy Goals        Problem: Physical Therapy Goal    Goal Priority Disciplines Outcome Goal Variances Interventions   Physical Therapy Goal     PT/OT, PT Ongoing (interventions implemented as appropriate)     Description:  Goals to be met by: 08/10/2018     Patient will increase functional independence with mobility by performin. Pt will be able to tolerate Supine.  2. Pt will be able to tolerate Sit to supine  3. Pt will be able to tolerate Sit to stand transfer   4. Pt will be able to tolerate Bed to chair transfer with most appropriate device.  5. Pt will be able to tolerate Gait  Activity using most appropriate device.     Goal edited 2018: Goals to be met 2018.    1. Pt will be able to tolerate supine<>sit mod A.  2. Pt will perform supine BLE exercises (actively/passively) x 10-20 as tolerated by patient.   3. Pt will tolerate sit<>stand, stand pivot transfers max A.                         PLAN:    Patient to be seen 6 x/week  to address the above listed problems via gait training, therapeutic exercises, therapeutic activities, neuromuscular re-education  Plan of Care expires: 08/24/18  Plan of Care reviewed with: patient         Darrel Hoff, PT  07/22/2018

## 2018-07-22 NOTE — NURSING
here to draw blood cultures.  Unable to obtain.  Will send another tech.  Pt agitated and cursing. Calms some with verbal reassurance.

## 2018-07-22 NOTE — PLAN OF CARE
Problem: Patient Care Overview  Goal: Plan of Care Review  Outcome: Ongoing (interventions implemented as appropriate)  Neuro unchanged. Follows simple commands. Moves left exts. Respirations unlabored. 5L NC in use. Occ non-productive cough. Tolerating tube feedings at 50cc hour. Goal 70cc.  Continues to receive water flushes.  R[place] intact, yuliet urine. Safety maintained. Soft wrist restraint on left arm.  Code status changed to DNR.

## 2018-07-22 NOTE — ASSESSMENT & PLAN NOTE
- etiology likely unclear but likely multifactorial and driven by underlying neurologic disease  - back on BiPAP at present --> improved O2 sats and tolerates well  - does have underlying COPD

## 2018-07-22 NOTE — NURSING
Spoke with Jenni in the lab. Informed still need blood cultures drawn.  States that Arianna dougherty, is in ED at present.

## 2018-07-22 NOTE — PLAN OF CARE
Problem: Patient Care Overview  Goal: Plan of Care Review  Outcome: Ongoing (interventions implemented as appropriate)  Neuro unchanged. Respirations unlabored today but had drop in O2 sats to 88%.  Titrated O2 to 5L NC then placed on Bipap with improvement in O2 sats.  Temp max 100.9 axillary. Tylenol ordered and administered via ngt.  SBP decreased to 60's this afternnoon.  NS 1 L bolus given. BP improved.  Blood cultures done and ABX's ordered. Tolerating tube feedings at goal of 70cc hour. Rothman intact with yuliet urine. Soft wrist restraint intact to Left wrist and mitten on.  No movement in right exts. Safety maintained. Rotation bed in use.

## 2018-07-22 NOTE — SUBJECTIVE & OBJECTIVE
Review of Systems  Objective:     Vital Signs (Most Recent):  Temp: 99.7 °F (37.6 °C) (07/22/18 1200)  Pulse: 98 (07/22/18 1200)  Resp: 20 (07/22/18 1200)  BP: 102/63 (07/22/18 1200)  SpO2: 95 % (07/22/18 1200) Vital Signs (24h Range):  Temp:  [97.5 °F (36.4 °C)-100.3 °F (37.9 °C)] 99.7 °F (37.6 °C)  Pulse:  [] 98  Resp:  [16-34] 20  SpO2:  [90 %-100 %] 95 %  BP: ()/(55-83) 102/63     Weight: 84.6 kg (186 lb 8.2 oz)  Body mass index is 28.36 kg/m².    Intake/Output Summary (Last 24 hours) at 07/22/18 1353  Last data filed at 07/22/18 1110   Gross per 24 hour   Intake             1290 ml   Output              415 ml   Net              875 ml      Physical Exam  General exam-lethargic, some resp distress; on NIPPV  HEENT-pupils sluggish but reactive.  Extraocular movements appear intact.  Oropharynx is clear and dry. nasogastric tube present in the patient's right nostril; back on BiPAP  Neck exam-no JVD or thyromegaly  Cardiovascular-regular rate rhythm no murmurs gallops rubs  Respiratory-bilateral sonorous breath sounds noted. Decreased breath sounds noted particularly on the left side.  Increased work of breathing noted with subcostal retractions.  Abdomen-soft, nontender nondistended.  Bowel sounds are normoactive.  Extremities-no cyanosis, clubbing, edema.  Noted mild mottling on the left lower extremity.  Pulses are 2+ and symmetric.  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  Right emelia-neglect also noted. Patient gait was not assessed secondary to hemiplegia.    Significant Labs:   BMP:   Recent Labs  Lab 07/21/18  0333   *   *   K 3.9      CO2 24   BUN 20   CREATININE 1.1   CALCIUM 9.2       Significant Imaging: I have reviewed all pertinent imaging results/findings within the past 24 hours.

## 2018-07-22 NOTE — PLAN OF CARE
"Problem: Patient Care Overview  Goal: Plan of Care Review  Outcome: Ongoing (interventions implemented as appropriate)  Patient free of falls and injuries this shift. Follows simple commands at times. States "No" when asked if he is at the store, [employer], bank but has no response when asked if he is at the hospital. Oral care done with thick, yellow secretions noted. Lip balm applied to dry lips. Full bath and skin care done. BM x1(see flow sheet). Tolerating tube feedings well with goal rate met.      "

## 2018-07-22 NOTE — NURSING
1345 Dr. Hawkins on rounds. Updated on temp, now 100.9 axillary.  New orders for tylenol via ngt and pcxr.  No lab orders at this time.     [id number]  PCXR done.  Tylenol 650mg given via NGT.

## 2018-07-23 LAB
ANION GAP SERPL CALC-SCNC: 11 MMOL/L
BUN SERPL-MCNC: 21 MG/DL
CALCIUM SERPL-MCNC: 8.5 MG/DL
CHLORIDE SERPL-SCNC: 101 MMOL/L
CO2 SERPL-SCNC: 27 MMOL/L
CREAT SERPL-MCNC: 1.2 MG/DL
EST. GFR  (AFRICAN AMERICAN): >60 ML/MIN/1.73 M^2
EST. GFR  (NON AFRICAN AMERICAN): >60 ML/MIN/1.73 M^2
GLUCOSE SERPL-MCNC: 218 MG/DL
POCT GLUCOSE: 218 MG/DL (ref 70–110)
POCT GLUCOSE: 227 MG/DL (ref 70–110)
POCT GLUCOSE: 230 MG/DL (ref 70–110)
POCT GLUCOSE: 237 MG/DL (ref 70–110)
POTASSIUM SERPL-SCNC: 3.4 MMOL/L
SODIUM SERPL-SCNC: 139 MMOL/L

## 2018-07-23 PROCEDURE — 25000003 PHARM REV CODE 250: Performed by: HOSPITALIST

## 2018-07-23 PROCEDURE — G8988 SELF CARE GOAL STATUS: HCPCS | Mod: CN

## 2018-07-23 PROCEDURE — G8981 BODY POS CURRENT STATUS: HCPCS | Mod: CN

## 2018-07-23 PROCEDURE — 36415 COLL VENOUS BLD VENIPUNCTURE: CPT

## 2018-07-23 PROCEDURE — 93010 ELECTROCARDIOGRAM REPORT: CPT | Mod: ,,, | Performed by: INTERNAL MEDICINE

## 2018-07-23 PROCEDURE — 27000221 HC OXYGEN, UP TO 24 HOURS

## 2018-07-23 PROCEDURE — 99900035 HC TECH TIME PER 15 MIN (STAT)

## 2018-07-23 PROCEDURE — 25000003 PHARM REV CODE 250: Performed by: INTERNAL MEDICINE

## 2018-07-23 PROCEDURE — 63600175 PHARM REV CODE 636 W HCPCS: Performed by: HOSPITALIST

## 2018-07-23 PROCEDURE — 20000000 HC ICU ROOM

## 2018-07-23 PROCEDURE — 97168 OT RE-EVAL EST PLAN CARE: CPT

## 2018-07-23 PROCEDURE — G8982 BODY POS GOAL STATUS: HCPCS | Mod: CM

## 2018-07-23 PROCEDURE — 63600175 PHARM REV CODE 636 W HCPCS: Performed by: INTERNAL MEDICINE

## 2018-07-23 PROCEDURE — G8987 SELF CARE CURRENT STATUS: HCPCS | Mod: CN

## 2018-07-23 PROCEDURE — 36569 INSJ PICC 5 YR+ W/O IMAGING: CPT

## 2018-07-23 PROCEDURE — 80048 BASIC METABOLIC PNL TOTAL CA: CPT

## 2018-07-23 PROCEDURE — 76937 US GUIDE VASCULAR ACCESS: CPT

## 2018-07-23 PROCEDURE — 94660 CPAP INITIATION&MGMT: CPT

## 2018-07-23 PROCEDURE — 93005 ELECTROCARDIOGRAM TRACING: CPT

## 2018-07-23 PROCEDURE — A4216 STERILE WATER/SALINE, 10 ML: HCPCS | Performed by: INTERNAL MEDICINE

## 2018-07-23 PROCEDURE — 97110 THERAPEUTIC EXERCISES: CPT

## 2018-07-23 PROCEDURE — 97530 THERAPEUTIC ACTIVITIES: CPT

## 2018-07-23 PROCEDURE — 94761 N-INVAS EAR/PLS OXIMETRY MLT: CPT

## 2018-07-23 PROCEDURE — 97164 PT RE-EVAL EST PLAN CARE: CPT

## 2018-07-23 PROCEDURE — C1751 CATH, INF, PER/CENT/MIDLINE: HCPCS

## 2018-07-23 RX ORDER — METOPROLOL TARTRATE 25 MG/1
TABLET ORAL
Status: DISPENSED
Start: 2018-07-23 | End: 2018-07-24

## 2018-07-23 RX ORDER — NOREPINEPHRINE BITARTRATE/D5W 8 MG/250ML
0.05 PLASTIC BAG, INJECTION (ML) INTRAVENOUS CONTINUOUS PRN
Status: DISCONTINUED | OUTPATIENT
Start: 2018-07-23 | End: 2018-07-25

## 2018-07-23 RX ADMIN — SODIUM CHLORIDE, PRESERVATIVE FREE 3 ML: 5 INJECTION INTRAVENOUS at 06:07

## 2018-07-23 RX ADMIN — TRAZODONE HYDROCHLORIDE 150 MG: 50 TABLET ORAL at 09:07

## 2018-07-23 RX ADMIN — SODIUM CHLORIDE 500 ML: 0.9 INJECTION, SOLUTION INTRAVENOUS at 10:07

## 2018-07-23 RX ADMIN — BUSPIRONE HYDROCHLORIDE 5 MG: 5 TABLET ORAL at 08:07

## 2018-07-23 RX ADMIN — PAROXETINE HYDROCHLORIDE HEMIHYDRATE 40 MG: 20 TABLET, FILM COATED ORAL at 05:07

## 2018-07-23 RX ADMIN — INSULIN ASPART 1 UNITS: 100 INJECTION, SOLUTION INTRAVENOUS; SUBCUTANEOUS at 12:07

## 2018-07-23 RX ADMIN — ASPIRIN 300 MG: 300 SUPPOSITORY RECTAL at 08:07

## 2018-07-23 RX ADMIN — INSULIN ASPART 2 UNITS: 100 INJECTION, SOLUTION INTRAVENOUS; SUBCUTANEOUS at 09:07

## 2018-07-23 RX ADMIN — ATORVASTATIN CALCIUM 40 MG: 40 TABLET, FILM COATED ORAL at 09:07

## 2018-07-23 RX ADMIN — ENOXAPARIN SODIUM 40 MG: 100 INJECTION SUBCUTANEOUS at 06:07

## 2018-07-23 RX ADMIN — SODIUM CHLORIDE, PRESERVATIVE FREE 3 ML: 5 INJECTION INTRAVENOUS at 05:07

## 2018-07-23 RX ADMIN — PIPERACILLIN SODIUM AND TAZOBACTAM SODIUM 4.5 G: 4; .5 INJECTION, POWDER, FOR SOLUTION INTRAVENOUS at 08:07

## 2018-07-23 RX ADMIN — SODIUM CHLORIDE, PRESERVATIVE FREE 3 ML: 5 INJECTION INTRAVENOUS at 09:07

## 2018-07-23 RX ADMIN — PIPERACILLIN SODIUM AND TAZOBACTAM SODIUM 4.5 G: 4; .5 INJECTION, POWDER, FOR SOLUTION INTRAVENOUS at 01:07

## 2018-07-23 RX ADMIN — INSULIN ASPART 1 UNITS: 100 INJECTION, SOLUTION INTRAVENOUS; SUBCUTANEOUS at 10:07

## 2018-07-23 RX ADMIN — INSULIN ASPART 2 UNITS: 100 INJECTION, SOLUTION INTRAVENOUS; SUBCUTANEOUS at 06:07

## 2018-07-23 RX ADMIN — CLOPIDOGREL BISULFATE 75 MG: 75 TABLET ORAL at 08:07

## 2018-07-23 RX ADMIN — PIPERACILLIN SODIUM AND TAZOBACTAM SODIUM 4.5 G: 4; .5 INJECTION, POWDER, FOR SOLUTION INTRAVENOUS at 06:07

## 2018-07-23 RX ADMIN — POLYETHYLENE GLYCOL (3350) 17 G: 17 POWDER, FOR SOLUTION ORAL at 08:07

## 2018-07-23 NOTE — PLAN OF CARE
Problem: Physical Therapy Goal  Goal: Physical Therapy Goal  Goals to be met by: 2018     Patient will increase functional independence with mobility by performin. Supine to sit with Maximum Assistance  2. Rolling to Left and Right with Moderate Assistance.  3. Sitting at edge of bed x15 minutes with Maximum Assistance  4. Increased functional strength to 2/5 for RLE    .  Goals to be met by: 08/10/2018     Patient will increase functional independence with mobility by performin. Pt will be able to tolerate Supine.  2. Pt will be able to tolerate Sit to supine  3. Pt will be able to tolerate Sit to stand transfer   4. Pt will be able to tolerate Bed to chair transfer with most appropriate device.  5. Pt will be able to tolerate Gait  Activity using most appropriate device.     Goal edited 2018: Goals to be met 2018.    1. Pt will be able to tolerate supine<>sit mod A.  2. Pt will perform supine BLE exercises (actively/passively) x 10-20 as tolerated by patient.   3. Pt will tolerate sit<>stand, stand pivot transfers max A.       Outcome: Revised  PT re eval completed. Pt with 0/5 R side. AROME present L side 4/5. Pt with periods of restlessness and pulling on L mitten to mouth - ex spouse present and assisting with care

## 2018-07-23 NOTE — NURSING
Placed 18g 10 cm Bard PowerGlide Midline catheter to pts right upper arm with the use of ultrasound guidance.   Ultrasound guidance: yes  Vessel Caliber: medium and patent, compressibility normal  Needle advanced into vessel with real time Ultrasound guidance.  Guidewire confirmed in vessel.  Image recorded and saved.  Sterile sheath used.  Sterile dressing applied  Limb Alert  Arm Circumference 31 cm

## 2018-07-23 NOTE — PLAN OF CARE
Problem: Occupational Therapy Goal  Goal: Occupational Therapy Goal  Updated Goals to be met by: 8/3/18     Patient will increase functional independence with ADLs by performing:    Grooming while in supported sitting with Minimal Assistance and Assistive Devices as needed.  Sitting at edge of bed x5 minutes with Moderate Assistance and use of upper extremity support.  R hand and forearm to be elevated/supported on pillows to prevent edema and shoulder joint subluxation.      Goals to be met by: 7/29/18     Patient will increase functional independence with ADLs by performing:    Grooming while in supported sitting with Minimal Assistance and Assistive Devices as needed.  Sitting at edge of bed x5 minutes with Moderate Assistance and use of upper extremity support.  R hand and forearm to be elevated/supported on pillows to prevent edema and shoulder joint subluxation.     Outcome: Revised  OT Re-Evaluation completed today. OT POC updated.    ANDREW Ahuja  7/23/2018

## 2018-07-23 NOTE — PROGRESS NOTES
This note also relates to the following rows which could not be included:  SpO2 - Cannot attach notes to unvalidated device data  Pulse - Cannot attach notes to unvalidated device data  Resp - Cannot attach notes to unvalidated device data       07/23/18 0712   Patient Assessment/Suction   Level of Consciousness (AVPU) responds to voice   All Lung Fields Breath Sounds diminished   PRE-TX-O2-ETCO2   O2 Device (Oxygen Therapy) nasal cannula   $ Is the patient on Low Flow Oxygen? Yes   Flow (L/min) 3   Oxygen Concentration (%) 32   Pulse Oximetry Type Continuous   $ Pulse Oximetry - Multiple Charge Pulse Oximetry - Multiple   Ready to Wean/Extubation Screen   FIO2<=50 (chart decimal) 0.32

## 2018-07-23 NOTE — PROGRESS NOTES
Daughter Makayla called back and agreed with proceeding with midline- no decision about levophed for future bp support. Makayla states her mother (pt's ex-wife) is on her way to hospital now to see pt. Makayla reassured bp readings are within normal limits at this time.

## 2018-07-23 NOTE — PT/OT/SLP RE-EVAL
"Occupational Therapy   Re-evaluation    Name: Kel Lock  MRN: [id number]  Admitting Diagnosis:  Cerebrovascular accident (CVA) due to bilateral thrombosis of carotid arteries      Recommendations:     Discharge Recommendations: nursing facility, skilled  Discharge Equipment Recommendations:   (TBD at next level of care)  Barriers to discharge:  Decreased caregiver support    History:     Past Medical History:   Diagnosis Date    COPD (chronic obstructive pulmonary disease)     Depression     Diabetes mellitus     Hypertension     Stroke        Past Surgical History:   Procedure Laterality Date    carpal tunel surgery Bilateral     coronary artery endartarectomy      FRACTURE SURGERY      SHOULDER SURGERY Right        Subjective     Chief Complaint: "Stop f*#*ing with me!"  Patient/Family stated goals: Unable to state  Communicated with: Kristal nurse prior to session.  Pain/Comfort:  · Pain Rating 1: 0/10  · Pain Rating Post-Intervention 1: 0/10    Objective:     Patient found with: NG tube, oxygen, telemetry, SCD, restraints, blood pressure cuff, nair catheter, pulse ox (continuous)    General Precautions: Standard, fall, NPO, aspiration   Orthopedic Precautions:N/A   Braces: N/A     Occupational Performance:    Bed Mobility:    · OT noted pt's upper body leaning to L side with HOB at 35*.Total assistance to reposition pt's upper body to center of bed with HOB raised to 45*.    Activities of Daily Living:  · Dependent for all tasks.    Cognitive/Visual Perceptual:  Cognitive/Psychosocial Skills:     -       Oriented to: Person   -       Follows Commands/attention:Inattentive and Follows 4/10 one step commands  -       Communication: dysarthria  -       Memory: Impaired STM and Poor immediate recall  -       Safety awareness/insight to disability: impaired   -       Mood/Affect/Coping skills/emotional control: Lethargic and Agitated  Visual/Perceptual:      -Impaired  with pt unable to accurately state how " many fingers were being held up at midline. Cues needed to keep his eyes open.    Physical Exam:  Skin integrity: L elbow and forearm red and inflamed  Edema:  Moderate R hand and wrist  Sensation:  R UE impaired  Dominant hand:    -       Pt stated twice that he was left handed.  Upper Extremity Range of Motion:     -       Right Upper Extremity: PROM WNL; no AROM  -       Left Upper Extremity: WFL  Upper Extremity Strength:    -       Right Upper Extremity: flaccid  -       Left Upper Extremity: WFL   Strength:    -       Right Upper Extremity: flaccid  -       Left Upper Extremity: WFL  Fine Motor Coordination: R hand completely impaired  Patient left HOB elevated with all lines intact, call button in reach, restraints reapplied at end of session and Kristal, nurse notified    AMPA 6 Click:  [facility] Total Score: 6    Treatment & Education:  OT oriented pt to time, place & situation as well as how to use call button.  OT ed pt on OT role & POC as well as discharge recommendations.  OT placed two pillows under each arm to minimize edema and recommended to nursing staff to keep R UE elevated on pillows to prevent edema and shoulder joint subluxation.  Education:    Assessment:     Kel Lock is a 67 y.o. male with a medical diagnosis of Cerebrovascular accident (CVA) due to bilateral thrombosis of carotid arteries.  He presents with increased alertness and was better able to communicate since initial OT evaluation. Pt was lethargic but easily agitated with stimulation .  Performance deficits affecting function are weakness, impaired self care skills, impaired balance, decreased safety awareness, impaired endurance, impaired functional mobilty, decreased ROM, impaired skin, visual deficits, decreased upper extremity function, decreased lower extremity function, edema, impaired coordination, impaired sensation, gait instability, impaired cognition, abnormal tone, impaired fine motor, impaired cardiopulmonary  "response to activity.      Rehab Prognosis:  Fair; patient would benefit from acute skilled OT services to address these deficits and reach maximum level of function.         Clinical Decision Making:     3.  OT High:  "Pt evaluation falls under high complexity for evaluation category due to 5+ performance deficits identified with comprehensive assessments and significant modifications/assistance required. An expanded review of history and occupational profile completed in addition to expanded review of physical, cognitive and psychosocial history. Several treatment options considered in care."     Plan:     Patient to be seen 3 x/week to address the above listed problems via self-care/home management, therapeutic activities, therapeutic exercises, neuromuscular re-education, cognitive retraining  · Plan of Care Expires: 08/23/18  · Plan of Care Reviewed with: patient    This Plan of care has been discussed with the patient who was involved in its development and understands and is in agreement with the identified goals and treatment plan    GOALS:    Occupational Therapy Goals        Problem: Occupational Therapy Goal    Goal Priority Disciplines Outcome Interventions   Occupational Therapy Goal     OT, PT/OT Revised    Description:  Updated Goals to be met by: 8/3/18     Patient will increase functional independence with ADLs by performing:    Grooming while in supported sitting with Minimal Assistance and Assistive Devices as needed.  Sitting at edge of bed x5 minutes with Moderate Assistance and use of upper extremity support.  R hand and forearm to be elevated/supported on pillows to prevent edema and shoulder joint subluxation.      Goals to be met by: 7/29/18     Patient will increase functional independence with ADLs by performing:    Grooming while in supported sitting with Minimal Assistance and Assistive Devices as needed.  Sitting at edge of bed x5 minutes with Moderate Assistance and use of upper " extremity support.  R hand and forearm to be elevated/supported on pillows to prevent edema and shoulder joint subluxation.                       Time Tracking:     OT Date of Treatment: 07/23/18  OT Start Time: 0938  OT Stop Time: 0958  OT Total Time (min): 20 min    Billable Minutes:Re-eval 10  Therapeutic Activity 10    NADREW Albert  7/23/2018

## 2018-07-23 NOTE — PROGRESS NOTES
Daughter Abby notified of temporary drop in blood pressure. Spoke with her about possible need of levophed and midline catherter if pressure drops again. Daughter will speak to mother and give us a call back to see what they would want done.

## 2018-07-23 NOTE — PT/OT/SLP DISCHARGE
Occupational Therapy Discharge Summary    Kel Lock  MRN: [id number]   Principal Problem: Cerebrovascular accident (CVA) due to bilateral thrombosis of carotid arteries      Patient Discharged from acute Occupational Therapy on 7/19/18.  Please refer to prior OT note dated 7/19/18 for functional status.    Assessment:      Patient transferred to lower level of care secondary to respiratory distress    Objective:     GOALS:    Occupational Therapy Goals        Problem: Occupational Therapy Goal    Goal Priority Disciplines Outcome Interventions   Occupational Therapy Goal     OT, PT/OT Revised    Description:  Updated Goals to be met by: 8/3/18     Patient will increase functional independence with ADLs by performing:    Grooming while in supported sitting with Minimal Assistance and Assistive Devices as needed.  Sitting at edge of bed x5 minutes with Moderate Assistance and use of upper extremity support.  R hand and forearm to be elevated/supported on pillows to prevent edema and shoulder joint subluxation.      Goals to be met by: 7/29/18     Patient will increase functional independence with ADLs by performing:    Grooming while in supported sitting with Minimal Assistance and Assistive Devices as needed.  Sitting at edge of bed x5 minutes with Moderate Assistance and use of upper extremity support.  R hand and forearm to be elevated/supported on pillows to prevent edema and shoulder joint subluxation.                       Reasons for Discontinuation of Therapy Services  Transfer to alternate level of care.      Plan:     Patient Discharged to: ICU    ANDREW Albert  7/23/2018

## 2018-07-23 NOTE — PT/OT/SLP RE-EVAL
"Physical Therapy Re-evaluation    Patient Name:  Kel Lock   MRN:  [id number]    Recommendations:     Discharge Recommendations:  rehabilitation facility   Discharge Equipment Recommendations:  (TBD)   Barriers to discharge: Decreased caregiver support    Assessment:     Kel Lock is a 67 y.o. male admitted with a medical diagnosis of Cerebrovascular accident (CVA) due to bilateral thrombosis of carotid arteries.  He presents with the following impairments/functional limitations:  weakness, impaired endurance, impaired self care skills, impaired functional mobilty, impaired cognition, decreased upper extremity function, decreased lower extremity function, decreased safety awareness, impaired cardiopulmonary response to activity . PT reconsulted in ICU. Pt awake, says a few words appropriately- curses a few times. Ex spouse at bedside who stated pt was previously independent and lived at home with his brother. Pt with episode of hypotension earlier but stable now per nurse Horvath. Pt has periods of restlessness and agitation requiring mitten restraints LUE. Pt to benefit from continued therapies.    Rehab Prognosis:  fair; patient would benefit from acute skilled PT services to address these deficits and reach maximum level of function.      Recent Surgery: * No surgery found *      Plan:     During this hospitalization, patient to be seen daily to address the above listed problems via therapeutic activities, therapeutic exercises  · Plan of Care Expires:  08/31/18   Plan of Care Reviewed with: patient, spouse    Subjective     Communicated with nurse horvath prior to session.  Patient found supine upon PT entry to room, agreeable to evaluation.    Pt shook head when asked of pain  Ex wife came in, pt acknowledged her  Pt says few cursing words but calms down when ex wife talks to him  Stated 'No" when asked by ex wife if he's getting tired of being here    Chief Complaint: none  Patient comments/goals: none " stated  Pain/Comfort:  · Pain Rating 1: 0/10    Patients cultural, spiritual, Congregation conflicts given the current situation: none reported       Objective:     Patient found with: blood pressure cuff, NG tube, oxygen, peripheral IV, PICC line, restraints, telemetry     General Precautions: Standard, fall, respiratory   Orthopedic Precautions:N/A   Braces: N/A     Exams:  · RLE ROM: WFL  · RLE Strength: 0/5  · LLE ROM: WFL  · LLE Strength: 4/5    Functional Mobility:  · Bed Mobility:     · Rolling Left:  maximal assistance  · Scooting: total assistance and of 2 persons    AM-PAC 6 CLICK MOBILITY  Total Score:7       Therapeutic Activities and Exercises:   thera ex and repositioning in bed  Pt has mitten restraint to L hand but still attempted to bring mitten to mouth and attempting to pull off  Pt repositioned to head of bed but rolls to L side, requiring repositioning  Pillows utilized to offload heels and elevate UE's at end of session    Patient left HOB elevated with all lines intact, nurse Kristal notified and ex wife present.    GOALS:    Physical Therapy Goals        Problem: Physical Therapy Goal    Goal Priority Disciplines Outcome Goal Variances Interventions   Physical Therapy Goal     PT/OT, PT Revised     Description:  Goals to be met by: 2018     Patient will increase functional independence with mobility by performin. Supine to sit with Maximum Assistance  2. Rolling to Left and Right with Moderate Assistance.  3. Sitting at edge of bed x15 minutes with Maximum Assistance  4. Increased functional strength to 2/5 for RLE    .  Goals to be met by: 08/10/2018     Patient will increase functional independence with mobility by performin. Pt will be able to tolerate Supine.  2. Pt will be able to tolerate Sit to supine  3. Pt will be able to tolerate Sit to stand transfer   4. Pt will be able to tolerate Bed to chair transfer with most appropriate device.  5. Pt will be able to tolerate  Gait  Activity using most appropriate device.     Goal edited 07/19/2018: Goals to be met 08/24/2018.    1. Pt will be able to tolerate supine<>sit mod A.  2. Pt will perform supine BLE exercises (actively/passively) x 10-20 as tolerated by patient.   3. Pt will tolerate sit<>stand, stand pivot transfers max A.                         History:     Past Medical History:   Diagnosis Date    COPD (chronic obstructive pulmonary disease)     Depression     Diabetes mellitus     Hypertension     Stroke        Past Surgical History:   Procedure Laterality Date    carpal tunel surgery Bilateral     coronary artery endartarectomy      FRACTURE SURGERY      SHOULDER SURGERY Right        Clinical Decision Making:     History  Co-morbidities and personal factors that may impact the plan of care Examination  Body Structures and Functions, activity limitations and participation restrictions that may impact the plan of care Clinical Presentation   Decision Making/ Complexity Score   Co-morbidities:   [] Time since onset of injury / illness / exacerbation  [] Status of current condition  []Patient's cognitive status and safety concerns    [] Multiple Medical Problems (see med hx)  Personal Factors:   [] Patient's age  [] Prior Level of function   [] Patient's home situation (environment and family support)  [] Patient's level of motivation  [] Expected progression of patient      HISTORY:(criteria)    [] 64398 - no personal factors/history    [] 57371 - has 1-2 personal factor/comorbidity     [] 44138 - has >3 personal factor/comorbidity     Body Regions:  [] Objective examination findings  [] Head     []  Neck  [] Trunk   [] Upper Extremity  [] Lower Extremity    Body Systems:  [] For communication ability, affect, cognition, language, and learning style: the assessment of the ability to make needs known, consciousness, orientation (person, place, and time), expected emotional /behavioral responses, and learning  preferences (eg, learning barriers, education  needs)  [] For the neuromuscular system: a general assessment of gross coordinated movement (eg, balance, gait, locomotion, transfers, and transitions) and motor function  (motor control and motor learning)  [] For the musculoskeletal system: the assessment of gross symmetry, gross range of motion, gross strength, height, and weight  [] For the integumentary system: the assessment of pliability(texture), presence of scar formation, skin color, and skin integrity  [] For cardiovascular/pulmonary system: the assessment of heart rate, respiratory rate, blood pressure, and edema     Activity limitations:    [] Patient's cognitive status and saf ety concerns          [] Status of current condition      [] Weight bearing restriction  [] Cardiopulmunary Restriction    Participation Restrictions:   [] Goals and goal agreement with the patient     [] Rehab potential (prognosis) and probable outcome      Examination of Body System: (criteria)    [] 41716 - addressing 1-2 elements    [] 92677 - addressing a total of 3 or more elements     [] 31730 -  Addressing a total of 4 or more elements         Clinical Presentation: (criteria)  Choose one     On examination of body system using standardized tests and measures patient presents with (CHOOSE ONE) elements from any of the following: body structures and functions, activity limitations, and/or participation restrictions.  Leading to a clinical presentation that is considered (CHOOSE ONE)                              Clinical Decision Making  (Eval Complexity):  Choose One     Time Tracking:     PT Received On: 07/23/18  PT Start Time: 1313     PT Stop Time: 1335  PT Total Time (min): 22 min     Billable Minutes: Re-eval 10 and Therapeutic Exercise 12      Mariel Garza, PT  07/23/2018

## 2018-07-23 NOTE — MEDICAL/APP STUDENT
[]Hide copied text  [facility] Medicine  Progress Note     Patient Name: Kel Lock  MRN: [id number]  Patient Class: IP- Inpatient     Admission Date: 7/15/2018  Length of Stay: 7 days  Attending Physician: Avinash Hawkins MD  Primary Care Provider: [facility]           Subjective:      Principal Problem:Cerebrovascular accident (CVA) due to bilateral thrombosis of carotid arteries     HPI:  Mr. Lock is a 67 YOCM with PMH of HTN, DM, HLD, who was last seen by his ex-wife 3 days ago, normal , and apparently was found lying on floor unable to move himself back to the bed after he had a fall, heard by his roommate. The above statement was obtained from the medical record. Family at bed sided include his daughter, son in-law, ex wife, who have not witness any change in his clinical status.   He was presented to the [place] ER with right sided weakness, slurred speech and altered mental status. Telestroke was consulted who calculated his NIH value at 16. Initial CT head showed areas of moderate involutional changes consistent with microvascular ischemic changes with encephalomalacia watershed area of distribution, and subacute infarcts at right and left parieto-occipital cortex.   Pt was communicated with neurology, apparently who recommended MRI of the brain without contrast.   At the time of my interview with the patient, he denied CP, SOB or HA, problem with vision, but he clearly has left preference and right hemineglect. The pt is asking for water since he has touched the floor.         Hospital Course:  7/19: 66 y/o unfortunate  who has suffered a very large territory, bilateral CVA, including L MCA territory. Unable to perform MRI due to presence of ICD. Was not a candidate for tPA or endovascular therapy --> treating with statin, anti-htn, and DAPT. Does not appear to have made any meaningful clinical progress. Has flaccid paralysis  of R side. Occasionally follows some minior commands with fluctuating consciousness. Some resp distress requiring BiPAP. Long discussion at bedside with family about GoC.      7/20: Pt seen and examined. Moved to ICU overnight due to needs for BiPAP. More awake/alert this AM, but quite agitated. Spontaneous movement of L side, but remains with R flaccid paralysis. Hemodynamically otherwise stable at the moment.      7/21: No major changes overnight. Pt awakens to voice and responds to some commands, but remains with fluctuating consciousness. BP stable off anti-htn. Remains with R hemiparesis, dysphagia, dysarthria.      7/22: Pt seen and examined. Had some desaturation and mod resp distress earlier --> back on BiPAP currently with improved O2 sat. Did have some fever as well. Tolerating tube feeds at present. Now DNR.    7/23: Patient Seen and examined.  While in room, patient had significant hypotensive episode (69/49), and signifigant diaphoresis while being boulsed NS.  BP recovered quickly, prior to significant volume administration.     Review of Systems  Objective:      Vital Signs (Most Recent):  Temp: 98.5  Pulse: 76  Resp: 20  BP: 130/58  SpO2: 95% Vital Signs (24h)  Temp:97.8 °F (36.6 °C) - 100.9 (38.3)  Pulse: 68 - 102  Resp: 13-50  BP: 69/43 - 167-98  SpO2: 92% - 100%      Weight: 185 Lb 84Kg.     Intake/Output Summary (Last 24 hours) at 07/23/18 1205  Last data filed at 07/23/18 0846   Gross per 24 hour   Intake           5756.8 ml   Output              970 ml   Net           4786.8 ml         Physical Exam  General exam-lethargic, some resp distress; on NIPPV  HEENT-pupils sluggish but reactive.  Extraocular movements appear intact.  Oropharynx is clear and dry. nasogastric tube present in the patient's right nostril; back on BiPAP  Neck exam-no JVD or thyromegaly  Cardiovascular-regular rate rhythm no murmurs gallops rubs  Respiratory-bilateral sonorous breath sounds noted. Assessment limited by  spontaneous vocalization.  Abdomen-soft, nontender nondistended.  Bowel sounds are normoactive.  Extremities-no cyanosis, clubbing, edema.  Noted mild mottling on the left lower extremity.  Pulses are 2+ and symmetric.  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  Right emelia-neglect also noted. Patient gait was not assessed secondary to hemiplegia.     Significant Labs:   BMP:   Recent Labs    Recent Labs  Lab 07/23/18  0309      K 3.4*      CO2 27   BUN 21   CREATININE 1.2          Significant Imaging: I have reviewed all pertinent imaging results/findings within the past 24 hours.     Assessment/Plan:          * Cerebrovascular accident (CVA) due to bilateral thrombosis of carotid arteries     - large territory bilateral CVA including L MCA territory  - numerous risk factors including HLD, htn, previous carotid disease, and smoking  - agree with Neuro, at risk for hemorrgaic conversion given size of infarction --> monitor and repeat imaging if abrupt Neuro change  - continue DAPT, statin  - BP stable at moment, but if SBP > 150 can introduce low-dose ACE-I  - low likelihood of meaningful recovery --> extensive GoC discusison with family;now DNR and contemplating future care          Type 2 diabetes mellitus     - adequate glycemic control for now  - continue enteral feeds             Lab Results   Component Value Date     HGBA1C 7.2 (H) 07/15/2018    .lab  Most recent fingerstick glucose reviewed-      Recent Labs  Lab 07/21/18  1815 07/22/18  0030 07/22/18  1235   POCTGLUCOSE 168* 163* 182*      Current correctional scale  Low  Maintain anti-hyperglycemic dose as follows-              Antihyperglycemics      Start     Stop Route Frequency Ordered     07/17/18 1744   insulin aspart U-100 pen 0-5 Units      -- SubQ As needed (PRN) 07/17/18 1644                     Essential hypertension     -Now intermittant periods of hypotension of unknown etiology,  potentially neurogenic  -IV Norepinephrine PRN if MAP <60          Acute hypercapnic respiratory failure     - etiology likely unclear but likely multifactorial and driven by underlying neurologic disease  - back on BiPAP at present --> improved O2 sats and tolerates well  - does have underlying COPD                Hypernatremia     - Na 139 and trending down  - related to free H2O deficit from inability to swallow and drink; correcting with NG hydration  - continue NG hydration and monoitoring             Encephalopathy, metabolic     Multiple potential underlying etiologies, with neurologic(CVA) and metabolic derangements being most common and potential hypercarbia as well.  Will continue to monitor mental status closely.             Malnutrition of moderate degree     NGT inserted. Continue feeds. May need PEG if unable to swallow.             GRACIE (acute kidney injury)      Patient with GRACIE likely d/t IVVD  Which is currently improving. Labs reviewed- BMP with Estimated Creatinine Clearance: 72.9 mL/min (based on SCr of 1 mg/dL). according to latest data. Monitor UOP and serial BMP and adjust therapy as needed. Renally dose meds for GFR listed above.                HLD (hyperlipidemia)      Patient is chronically on statin. Will continue for now. Monitor clinically. Last LDL was           Lab Results   Component Value Date     LDLCALC 63.8 07/15/2018                             VTE Risk Mitigation          Ordered       enoxaparin injection 40 mg  Daily      07/17/18 0938       IP VTE HIGH RISK PATIENT  Once      07/15/18 1152             Pt seen and examined with medical student. 68 y/o M with large territory bihemispheric CVA. Remains with fluctuating consciousness and       Critical care time spent on the evaluation and treatment of severe organ dysfunction, review of pertinent labs and imaging studies, discussions with consulting providers and discussions with patient/family: 45 minutes.     Avinash  MD Ross  Department of Hospital Medicine   [facility]

## 2018-07-23 NOTE — PHYSICIAN QUERY
PT Name: Kel Lock  MR #: [id number]  Physician Query Form - Renal Condition Clarification     CDS: Ethel Dow RN, CCDS       Contact information: [email or web address]    This form is a permanent document in the medical record.     QueryDate: July 23, 2018    By submitting this query, we are merely seeking further clarification of documentation. Please utilize your independent clinical judgment when addressing the question(s) below.    The Medical record contains the following:   Indicator Supporting Clinical Findings Location in Medical Record    Kidney (Renal) Insufficiency     X Kidney (Renal) Failure / Injury GRACIE (acute kidney injury) 7/15-H&P    Nephrotoxic Agents      BUN/Creatinine GFR     X Urine: Casts         Eosinophils Hyaline Casts=6 7/15-Urinalysis   X Dehydration GRACIE (acute kidney injury)  -Suspect prerenal due to dehydration  7/16-PN    Nausea/Vomiting      Dialysis/CRRT     X Treatment: -Avoid nephrotoxic medication.  -Continue IV hydration 7/15-H&P   X Other:  Cerebrovascular accident (CVA) due to bilateral thrombosis of carotid arteries 7/16-PN   Acute Kidney Injury / Acute Renal Failure has different defining criteria. A generally accepted guideline  is:   A greater than 100% (2X) rise in serum creatinine from baseline* occurring during the course of a single hospital stay.   *Baseline as determined by the providers judgment and consideration of previous lab values and other documentation, if available.    A diagnosis of Acute Kidney Injury/ Acute Renal Failure should incorporate abnormal labs and clinical findings that are clinically significant      References: 1. Sterling et al. Acute renal failure-definition, outcome measures, animal models, fluid therapy and information technology needs: the Second International Consensus Conference of the Acute Dialysis Quality Initiative (ADQI) Group. Crit Care 2004; 8:B204; 2. Gisele et al. Acute Kidney Injury Network: report of an initiative to  improve outcomes in acute kidney injury. Crit Care 2007; 11:R31; 3. Kidney Disease: Improving Global Outcomes (KDIGO). Acute Kidney Injury Work Group. KDIGO clinical practice guidelines for acute kidney injury. Kidney Int Suppl 2012; 2:1.    The clinical guidelines noted below is only a system guideline, it does not replace the providers clinical judgment.    Provider, please specify the diagnosis or diagnoses associated with above clinical findings.    [    ]  Acute Kidney Failure/Injury with Tubular Necrosis  Damage to the tubule cells of the kidney. Common triggers: shock, hypotension, IV contrast, rhabdomyolysis, medications  [    ]  Other Acute Kidney Failure/Injury (please specify): ____________    [  x  ]  Unspecified Acute Kidney Failure/Injury     [    ]  Other (please specify): _______________________________  [    ]  Clinically Undetermined    Please document in your progress notes daily for the duration of treatment until resolved and include in your discharge summary.

## 2018-07-23 NOTE — PLAN OF CARE
Problem: Patient Care Overview  Goal: Plan of Care Review  Outcome: Ongoing (interventions implemented as appropriate)  Pt remains in ICU. Had hypotensive episode this AM responded well with 500 ml fluid bolos. Pt remains confused, oriented to self only. Tolerating tube feeds at goal rate.  Denies any pain. Afebrile this shift. Safety maintained.

## 2018-07-23 NOTE — PLAN OF CARE
"Problem: Patient Care Overview  Goal: Plan of Care Review  Outcome: Ongoing (interventions implemented as appropriate)  Patient free of falls and injuries this shift. States "No" when asked if he is at the store,[employer], bank or hospital.  Remains confused with soft wrist restraint to rt wrist. Tolerating tube feedings well with a moderate BM x1. Able to cough up secretions and can be suctioned per alisha if you get it before he swallows it. Tolerated his bath well.      "

## 2018-07-23 NOTE — PLAN OF CARE
07/23/18 1132   Preset CPAP/BiPAP Settings   $ CPAP/BiPAP Daily Charge BiPAP/CPAP Daily   $ Is patient using? Yes   Size of Mask Medium/Large   Sized Appropriately? Yes   Airway Device Type large full face mask   Ipap 18   EPAP (cm H2O) 5   Pressure Support (cm H2O) 13   Set Rate (Breaths/Min) 20   ITime (sec) 1   Rise Time (sec) 2   Patient CPAP/BiPAP Settings   Timed Inspiration (Sec) 1   IPAP Rise Time (sec) 2   RR Total (Breaths/Min) 22   Tidal Volume (mL) 589   VE Minute Ventilation (L/min) 12 L/min   Peak Inspiratory Pressure (cm H2O) 21   TiTOT (%) 36   Total Leak (L/Min) 15   Patient Trigger - ST Mode Only (%) 48   CPAP/BiPAP Backup Settings   Backup Rate 20 breaths per minute (bpm)   CPAP/BiPAP Alarms   High Pressure (cm H2O) 25   Low Pressure (cm H2O) 10   Low Pressure Delay (Sec) 20   Minute Ventilation (L/Min) 3   High RR (breaths/min) 50   Low RR (breaths/min) 10   Apnea (Sec) 200   placed on BIPAP per Kristal RN

## 2018-07-24 LAB
ANION GAP SERPL CALC-SCNC: 10 MMOL/L
BUN SERPL-MCNC: 20 MG/DL
CALCIUM SERPL-MCNC: 8.5 MG/DL
CHLORIDE SERPL-SCNC: 98 MMOL/L
CO2 SERPL-SCNC: 30 MMOL/L
CREAT SERPL-MCNC: 1.1 MG/DL
EST. GFR  (AFRICAN AMERICAN): >60 ML/MIN/1.73 M^2
EST. GFR  (NON AFRICAN AMERICAN): >60 ML/MIN/1.73 M^2
GLUCOSE SERPL-MCNC: 234 MG/DL
POCT GLUCOSE: 231 MG/DL (ref 70–110)
POCT GLUCOSE: 238 MG/DL (ref 70–110)
POCT GLUCOSE: 248 MG/DL (ref 70–110)
POCT GLUCOSE: 276 MG/DL (ref 70–110)
POTASSIUM SERPL-SCNC: 3.4 MMOL/L
SODIUM SERPL-SCNC: 138 MMOL/L

## 2018-07-24 PROCEDURE — 80048 BASIC METABOLIC PNL TOTAL CA: CPT

## 2018-07-24 PROCEDURE — 97803 MED NUTRITION INDIV SUBSEQ: CPT

## 2018-07-24 PROCEDURE — 63600175 PHARM REV CODE 636 W HCPCS: Performed by: INTERNAL MEDICINE

## 2018-07-24 PROCEDURE — 94761 N-INVAS EAR/PLS OXIMETRY MLT: CPT

## 2018-07-24 PROCEDURE — 25000003 PHARM REV CODE 250: Performed by: INTERNAL MEDICINE

## 2018-07-24 PROCEDURE — 20000000 HC ICU ROOM

## 2018-07-24 PROCEDURE — 63600175 PHARM REV CODE 636 W HCPCS: Performed by: HOSPITALIST

## 2018-07-24 PROCEDURE — 99223 1ST HOSP IP/OBS HIGH 75: CPT | Mod: ,,, | Performed by: INTERNAL MEDICINE

## 2018-07-24 PROCEDURE — 97110 THERAPEUTIC EXERCISES: CPT

## 2018-07-24 PROCEDURE — 97530 THERAPEUTIC ACTIVITIES: CPT

## 2018-07-24 PROCEDURE — A4216 STERILE WATER/SALINE, 10 ML: HCPCS | Performed by: INTERNAL MEDICINE

## 2018-07-24 PROCEDURE — 27000221 HC OXYGEN, UP TO 24 HOURS

## 2018-07-24 PROCEDURE — 25000003 PHARM REV CODE 250: Performed by: HOSPITALIST

## 2018-07-24 PROCEDURE — 36415 COLL VENOUS BLD VENIPUNCTURE: CPT

## 2018-07-24 RX ADMIN — PIPERACILLIN SODIUM AND TAZOBACTAM SODIUM 4.5 G: 4; .5 INJECTION, POWDER, FOR SOLUTION INTRAVENOUS at 02:07

## 2018-07-24 RX ADMIN — ENOXAPARIN SODIUM 40 MG: 100 INJECTION SUBCUTANEOUS at 05:07

## 2018-07-24 RX ADMIN — PIPERACILLIN SODIUM AND TAZOBACTAM SODIUM 4.5 G: 4; .5 INJECTION, POWDER, FOR SOLUTION INTRAVENOUS at 05:07

## 2018-07-24 RX ADMIN — PIPERACILLIN SODIUM AND TAZOBACTAM SODIUM 4.5 G: 4; .5 INJECTION, POWDER, FOR SOLUTION INTRAVENOUS at 08:07

## 2018-07-24 RX ADMIN — INSULIN ASPART 1 UNITS: 100 INJECTION, SOLUTION INTRAVENOUS; SUBCUTANEOUS at 11:07

## 2018-07-24 RX ADMIN — INSULIN DETEMIR 10 UNITS: 100 INJECTION, SOLUTION SUBCUTANEOUS at 11:07

## 2018-07-24 RX ADMIN — INSULIN ASPART 2 UNITS: 100 INJECTION, SOLUTION INTRAVENOUS; SUBCUTANEOUS at 11:07

## 2018-07-24 RX ADMIN — SODIUM CHLORIDE, PRESERVATIVE FREE 3 ML: 5 INJECTION INTRAVENOUS at 02:07

## 2018-07-24 RX ADMIN — ASPIRIN 300 MG: 300 SUPPOSITORY RECTAL at 08:07

## 2018-07-24 RX ADMIN — BUSPIRONE HYDROCHLORIDE 5 MG: 5 TABLET ORAL at 08:07

## 2018-07-24 RX ADMIN — SODIUM CHLORIDE, PRESERVATIVE FREE 3 ML: 5 INJECTION INTRAVENOUS at 06:07

## 2018-07-24 RX ADMIN — ATORVASTATIN CALCIUM 40 MG: 40 TABLET, FILM COATED ORAL at 11:07

## 2018-07-24 RX ADMIN — TRAZODONE HYDROCHLORIDE 150 MG: 50 TABLET ORAL at 11:07

## 2018-07-24 RX ADMIN — PAROXETINE HYDROCHLORIDE HEMIHYDRATE 40 MG: 20 TABLET, FILM COATED ORAL at 06:07

## 2018-07-24 RX ADMIN — SODIUM CHLORIDE, PRESERVATIVE FREE 3 ML: 5 INJECTION INTRAVENOUS at 11:07

## 2018-07-24 RX ADMIN — CLOPIDOGREL BISULFATE 75 MG: 75 TABLET ORAL at 08:07

## 2018-07-24 RX ADMIN — INSULIN ASPART 3 UNITS: 100 INJECTION, SOLUTION INTRAVENOUS; SUBCUTANEOUS at 08:07

## 2018-07-24 RX ADMIN — INSULIN ASPART 2 UNITS: 100 INJECTION, SOLUTION INTRAVENOUS; SUBCUTANEOUS at 05:07

## 2018-07-24 NOTE — PLAN OF CARE
Problem: Patient Care Overview  Goal: Plan of Care Review  Outcome: Ongoing (interventions implemented as appropriate)  Pt with uneventful night. Denies pain or n/v. Free of fall or injury during shift. Rothman catheter intact with adequate output. O2 nasal cannula throughout night. VSS. TF running @70ml/hr as ordered. Will monitor.

## 2018-07-24 NOTE — PT/OT/SLP PROGRESS
Physical Therapy Treatment    Patient Name:  Kel Lock   MRN:  [id number]    Recommendations:     Discharge Recommendations:  rehabilitation facility   Discharge Equipment Recommendations:  (TBD)   Barriers to discharge: Decreased caregiver support    Assessment:     Kel Lock is a 67 y.o. male admitted with a medical diagnosis of Cerebrovascular accident (CVA) due to bilateral thrombosis of carotid arteries.  He presents with the following impairments/functional limitations:  weakness, impaired endurance, impaired self care skills, impaired functional mobilty, impaired balance, visual deficits, impaired cognition, decreased upper extremity function, decreased lower extremity function, decreased safety awareness, impaired cardiopulmonary response to activity . Pt seen at ICU, more alert and appropriate and following directions for thera ex. Pt progressed to EOB sitting with attempt towards standing. Pt to benefit from inpatient rehab.    Rehab Prognosis:  fair; patient would benefit from acute skilled PT services to address these deficits and reach maximum level of function.      Recent Surgery: * No surgery found *      Plan:     During this hospitalization, patient to be seen daily to address the above listed problems via therapeutic activities, therapeutic exercises  · Plan of Care Expires:  08/31/18   Plan of Care Reviewed with: patient    Subjective     Communicated with nurse Ambriz prior to session.  Patient found supine upon PT entry to room, agreeable to treatment.    Pt with NG nutrition, placed on hold and disconnected by nurse Medrano  Pt verbalizing/slurring  Pt stated of need for BM while seated EOB    Chief Complaint: none  Patient comments/goals: none stated  Pain/Comfort:  · Pain Rating 1: 0/10    Patients cultural, spiritual, Mandaen conflicts given the current situation: none reported     Objective:     Patient found with: peripheral IV, telemetry, SCD, restraints, pulse ox (continuous),  oxygen, NG tube, nair catheter, blood pressure cuff     General Precautions: Standard, fall   Orthopedic Precautions:N/A   Braces: N/A     Functional Mobility:  · Rolling to Right mod assist  · Rolling to Left side max assist  · Scooting total assist  · Supine to sit and back supine total assist  · Pt maintaining static sitting mod assist once COG achieved  · Forward head and trunk posture but attempted to correct with verbal and tactile cueing  · Pt with visual difficulties and attending object to right  · Sit to stand attempted 2x with 2 people assist with partial buttock clearance  · Back supine assist x2 and repositioned head of bed L hand mitten applied with restraint      AM-PAC 6 CLICK MOBILITY  Turning over in bed (including adjusting bedclothes, sheets and blankets)?: 2  Sitting down on and standing up from a chair with arms (e.g., wheelchair, bedside commode, etc.): 2  Moving from lying on back to sitting on the side of the bed?: 2  Moving to and from a bed to a chair (including a wheelchair)?: 1  Need to walk in hospital room?: 1  Climbing 3-5 steps with a railing?: 1  Basic Mobility Total Score: 9       Therapeutic Activities and Exercises:   thera ex to LE's and completed QS/GS x 10 reps  Active SLR LLE x 10 reps  Rolling and sitting activities EOB with 2 people assist x 10 min  Attempted standing for weight bearing with RLE block 2x    Patient left HOB elevated with all lines intact and nurse rufina to resume NG tube feeding present..    GOALS:    Physical Therapy Goals        Problem: Physical Therapy Goal    Goal Priority Disciplines Outcome Goal Variances Interventions   Physical Therapy Goal     PT/OT, PT Ongoing (interventions implemented as appropriate)     Description:  Goals to be met by: 2018     Patient will increase functional independence with mobility by performin. Supine to sit with Maximum Assistance  2. Rolling to Left and Right with Moderate Assistance.  3. Sitting at edge  of bed x15 minutes with Maximum Assistance  4. Increased functional strength to 2/5 for RLE    .  Goals to be met by: 08/10/2018     Patient will increase functional independence with mobility by performin. Pt will be able to tolerate Supine.  2. Pt will be able to tolerate Sit to supine  3. Pt will be able to tolerate Sit to stand transfer   4. Pt will be able to tolerate Bed to chair transfer with most appropriate device.  5. Pt will be able to tolerate Gait  Activity using most appropriate device.     Goal edited 2018: Goals to be met 2018.    1. Pt will be able to tolerate supine<>sit mod A.  2. Pt will perform supine BLE exercises (actively/passively) x 10-20 as tolerated by patient.   3. Pt will tolerate sit<>stand, stand pivot transfers max A.                         Time Tracking:     PT Received On: 18  PT Start Time: 1039     PT Stop Time: 1115  PT Total Time (min): 36 min     Billable Minutes:    Treatment Type: Treatment therapeutic exercises  13  Therapeutic Activities 23      PT/PTA: PT     PTA Visit Number: 0     Mariel Garza, PT  2018

## 2018-07-24 NOTE — CONSULTS
"[facility]     CC: Dysphagia    HPI 67 y.o. male admitted with CVA, now with acute, moderate to severe dysphagia s/p NG tube placement. He denies abdominal pain. His history is limited, thus primarily obtained from chart and ex-wife who is present during my exam- according to her their daughter makes his medical decisions and is interested in PEG tube placement.     Past Medical History:   Diagnosis Date    COPD (chronic obstructive pulmonary disease)     Depression     Diabetes mellitus     Hypertension     Stroke        Past Surgical History:   Procedure Laterality Date    carpal tunel surgery Bilateral     coronary artery endartarectomy      FRACTURE SURGERY      SHOULDER SURGERY Right        Social History   Substance Use Topics    Smoking status: Current Every Day Smoker     Packs/day: 2.00    Smokeless tobacco: Never Used    Alcohol use No   -No illicits    Family History   Problem Relation Age of Onset    Heart disease Mother     Heart disease Father     Early death Father     Stroke Brother        Review of Systems  Unable to obtain complete ROS due to recent CVA    Physical Examination  /65   Pulse 75   Temp 98.5 °F (36.9 °C) (Oral)   Resp (!) 25   Ht 5' 8" (1.727 m)   Wt 84.6 kg (186 lb 8.2 oz)   SpO2 (!) 94%   BMI 28.36 kg/m²   General appearance: alert, cooperative, no distress  HENT: Normocephalic, atraumatic, neck symmetrical, no nasal discharge, NG tube in place   Eyes: conjunctivae/corneas clear,EOM's intact, sclera anicteric  Lungs: clear to auscultation bilaterally, no dullness to percussion bilaterally, symmetric expansion, breathing unlabored  Heart: regular rate and rhythm without rub; no displacement of the PMI   Abdomen: soft, obese, nontender,nondistended, BS normoactive  Extremities: extremities symmetric; no clubbing, cyanosis, or edema  Integument: Skin color, texture, turgor normal; no rashes; hair distrubution normal, no jaundice  Neurologic: " Alert, right hemiplegia, right neglect    Labs:  Lab Results   Component Value Date    WBC 14.30 (H) 07/21/2018    HGB 13.0 (L) 07/21/2018    HCT 40.7 07/21/2018    MCV 93 07/21/2018     (L) 07/21/2018     -BUN- 20, Cr- 1.1    Imaging:  CXR was independently visualized and reviewed by me and showed NG tube, tortuous aorta.    Assessment:   67 y.o. male with recent CVA on Plavix and Aspirin now with dysphagia. GI consulted for PEG placement    Plan:  -Hold Plavix in AM  -Ok to continue Lovenox and Aspirin for now  -Will need to discuss with primary physician as well as patient's daughter holding Plavix for PEG placement, ideally would like held 5 days however he is a high risk for recurrent CVA , thus will discuss risks vs benefits of doing procedure sooner off Plavix (possibly Friday)    Radha Rothman MD  [facility]  [address], [address]  [place] [address]  Office: [phone]  Fax: [phone]

## 2018-07-24 NOTE — PROGRESS NOTES
07/24/18 0652   Patient Assessment/Suction   Level of Consciousness (AVPU) alert   PRE-TX-O2-ETCO2   O2 Device (Oxygen Therapy) nasal cannula   $ Is the patient on Low Flow Oxygen? Yes   Flow (L/min) 3   SpO2 97 %   Pulse Oximetry Type Continuous   $ Pulse Oximetry - Multiple Charge Pulse Oximetry - Multiple   Pulse 71   Resp 20   BP (!) 119/55   Preset CPAP/BiPAP Settings   Mode Of Delivery Standby

## 2018-07-24 NOTE — NURSING
Assumed care.  Agree with AM assessment.    1020:  Daughter at bedside.  Verbalized in agreement to patient receiving PEG tube for nutrition.

## 2018-07-24 NOTE — PT/OT/SLP PROGRESS
Occupational Therapy   Treatment    Name: Kel Lock  MRN: [id number]  Admitting Diagnosis:  Cerebrovascular accident (CVA) due to bilateral thrombosis of carotid arteries       Recommendations:     Discharge Recommendations: nursing facility, skilled  Discharge Equipment Recommendations:  other (see comments) (TBD)  Barriers to discharge:  Decreased caregiver support    Subjective     Communicated with: NurseMarcela, prior to session.  Pain/Comfort:  · Pain Rating 1: other (see comments) (pain in/on ear, unable to localize)    Patients cultural, spiritual, Anglican conflicts given the current situation:      Objective:     Patient found with: blood pressure cuff, central line, nair catheter, NG tube, oxygen, pulse ox (continuous), restraints    General Precautions: Standard, fall, NPO  Orthopedic Precautions:N/A   Braces: N/A     Occupational Performance:    Bed Mobility:    · Patient completed Rolling/Turning to Left with maximal assistance of 2 persons  · Patient completed Rolling/Turning to Right with moderate assistance of 2 persons  · Patient completed Supine to Sit with maximal assistance of 2 persons  · Patient completed Sit to Supine with total assistance of 3 persons    Patient left supine with all lines intact, restraints reapplied at end of session and Nurse Katina notified    [facility] 6 Click:  [facility] Total Score: 6    Treatment & Education:  Pt performed static sitting balance activities seated EOB with cues to maintain midline, upright sitting. Pt sat EOB for 10 minutes with Maximal assistance of 1-2 persons. OT oriented pt to time, place & situation and was able to recall place but not month or year. OT repositioned pt in supine in midline with R arm abducted and elevated with towel supporting finger extension.   Education:    Assessment:     Kel Lock is a 67 y.o. male with a medical diagnosis of Cerebrovascular accident (CVA) due to bilateral thrombosis of carotid arteries.  He presents with  impaired cognition and STM. Pt was only oriented to self, however, pt was able to recall where he after the OT oriented him earlier in the session. Pt was more alert and able to follow 75% of 1 step commands and was able to tolerate 10 minutes seated EOB. Performance deficits affecting function are weakness, impaired endurance, impaired sensation, impaired self care skills, impaired functional mobilty, impaired balance, visual deficits, impaired cognition, decreased coordination, decreased lower extremity function, decreased upper extremity function, decreased safety awareness, pain, edema, impaired cardiopulmonary response to activity.      Rehab Prognosis:  Fair; patient would benefit from acute skilled OT services to address these deficits and reach maximum level of function.       Plan:     Patient to be seen 3 x/week to address the above listed problems via self-care/home management, therapeutic activities, therapeutic exercises  · Plan of Care Expires: 08/23/18  · Plan of Care Reviewed with: patient    This Plan of care has been discussed with the patient who was involved in its development and understands and is in agreement with the identified goals and treatment plan    GOALS:    Occupational Therapy Goals        Problem: Occupational Therapy Goal    Goal Priority Disciplines Outcome Interventions   Occupational Therapy Goal     OT, PT/OT Revised    Description:  Updated Goals to be met by: 8/3/18     Patient will increase functional independence with ADLs by performing:    Grooming while in supported sitting with Minimal Assistance and Assistive Devices as needed.  Sitting at edge of bed x5 minutes with Moderate Assistance and use of upper extremity support.  R hand and forearm to be elevated/supported on pillows to prevent edema and shoulder joint subluxation.      Goals to be met by: 7/29/18     Patient will increase functional independence with ADLs by performing:    Grooming while in supported sitting  with Minimal Assistance and Assistive Devices as needed.  Sitting at edge of bed x5 minutes with Moderate Assistance and use of upper extremity support.  R hand and forearm to be elevated/supported on pillows to prevent edema and shoulder joint subluxation.                       Time Tracking:     OT Date of Treatment: 07/24/18  OT Start Time: 1050  OT Stop Time: 1113  OT Total Time (min): 23 min   Co-treat with PT    Billable Minutes:Therapeutic Activity 11    ABDIAZIZ Jeffries  7/24/2018   I certify that I was present in the room directing the student in service delivery and guiding them using my skilled judgment. As the co-signing therapist I have reviewed the students documentation and am responsible for the treatment, assessment, and plan.     ANDREW Ahuja  7/24/2018

## 2018-07-24 NOTE — PLAN OF CARE
Problem: Patient Care Overview  Goal: Plan of Care Review  Pt safety maintained and remains in ICU at this time.  Still confused, oriented to self.  Daughter now in agreeance for PEG placement.  GI consulted and wishes to be called tomorrow morning when daughter arrives.  Tolerating tube feeding at goal rate.  +BM today to bedpan.  Oxygen in use.  Afebrile.  [place]oth[place] to gravity.  Questions and concerns addressed.

## 2018-07-24 NOTE — PLAN OF CARE
Problem: Nutrition, Enteral (Adult)  Intervention: Monitor/Manage Nutrition Support  Recommendations    1) Enteral: Continue Diabetasource AC @  70 mls/hr.. Flush with 30 mls water Q 4 hrs or per MD. Hold if residual >250 mls.  Provides 2016 calories, 101 gms protein, 168 gms CHO, 1374 mls free water.    2) When medically appropriate: ADAT to diabetic 2000 calorie, cardiac diet with texture per SLP.     Goals: 1) Pt will receive nutrition within 72 hrs. 2) Pt will progress to =>85% EEN within 48 hrs.   Nutrition Goal Status: 1) Met 2) Met/ongoing  Communication of RD Recs:  (POC)

## 2018-07-24 NOTE — PLAN OF CARE
Problem: Physical Therapy Goal  Goal: Physical Therapy Goal  Goals to be met by: 2018     Patient will increase functional independence with mobility by performin. Supine to sit with Maximum Assistance  2. Rolling to Left and Right with Moderate Assistance.  3. Sitting at edge of bed x15 minutes with Maximum Assistance  4. Increased functional strength to 2/5 for RLE    .  Goals to be met by: 08/10/2018     Patient will increase functional independence with mobility by performin. Pt will be able to tolerate Supine.  2. Pt will be able to tolerate Sit to supine  3. Pt will be able to tolerate Sit to stand transfer   4. Pt will be able to tolerate Bed to chair transfer with most appropriate device.  5. Pt will be able to tolerate Gait  Activity using most appropriate device.     Goal edited 2018: Goals to be met 2018.    1. Pt will be able to tolerate supine<>sit mod A.  2. Pt will perform supine BLE exercises (actively/passively) x 10-20 as tolerated by patient.   3. Pt will tolerate sit<>stand, stand pivot transfers max A.        Outcome: Ongoing (interventions implemented as appropriate)  Pt seen for LE thera ex. EOB sitting assist x2 assist x 10 minutes, mod assist for static sitting, attempted standing with partial buttock clearance assist x2. Pt more alert, appropriate

## 2018-07-24 NOTE — NURSING
On 2000 assessment of wrist restraint, pt with redness to restraint area. Skin cleansed and mepilex placed and restraint loosened.   On 2200 wrist restraint check, pt calm and cooperative so wrist restraint removed to give skin a break.   On 2327 observation, pt starting to mess with NGT, therefore restraint replaced to L wrist and mitten placed. New order.

## 2018-07-24 NOTE — MEDICAL/APP STUDENT
Progress Note  [facility]    Admit Date: 7/15/2018    SUBJECTIVE:     Follow-up For:  Cerebrovascular accident (CVA) due to bilateral thrombosis of carotid arteries      Interval history (See H&P for complete P,F,SHx) :Mr. Lock continues to experience hemodynamic instability with blood pressures ranging from 60//75 yesterday, which are most likely neurogenic in nature. This morning's range has spanned 109//75, which may indicate a trend towards stabilization.  Likewise, his respirations and oxygenation fluctuated yesterday with at least one episode of tachypnea to 33 breaths/min and desaturation to 77%, with saturations remaining above 97% this morning.      His blood sugar has been trending upwards, and he has not been administered home oral antihyperglycemics.  He has been started on Insulin Detemir to control blood sugar.      Patient is more alert, and is now orented to person, time, and place.  Vikas has moderate-severe productive aphasia, and moderate receptive aphasia, initally communicating via nodding and shaking head affirmitive/negitive, and progressing to labored production of meaningful speech. He has been spontaneously verbalizing needs, as well.       Hospital Course:  7/19: 66 y/o unfortunate  who has suffered a very large territory, bilateral CVA, including L MCA territory. Unable to perform MRI due to presence of ICD. Was not a candidate for tPA or endovascular therapy --> treating with statin, anti-htn, and DAPT. Does not appear to have made any meaningful clinical progress. Has flaccid paralysis of R side. Occasionally follows some minior commands with fluctuating consciousness. Some resp distress requiring BiPAP. Long discussion at bedside with family about GoC.      7/20: Pt seen and examined. Moved to ICU overnight due to needs for BiPAP. More awake/alert this AM, but quite agitated. Spontaneous movement of L side, but remains with R flaccid  "paralysis. Hemodynamically otherwise stable at the moment.      7/21: No major changes overnight. Pt awakens to voice.    7/23: Patent experienced several hypotensive episodes.  Neurological status remains largely unchanged, with fluctuating consciousness, oriented to self only.    7/24: Ongoing hypotensive and hypertensive episodes, with decreasing severity.  Patient is more alert, now orented to person, time, and place.  Beet has moderate-severe productive aphasia, and moderate receptive aphasia, initally communicating via nodding and shaking head affirmitive/negitive, and progressing to labored production of meaningful speech.     Review of Systems: Systems were reviewed and otherwise negative unless indicated below.  Pain scale: 0/10  Gen- Weakness/ Fatigue.  Neuro- improving LOC: A&Ox3  CV- NAD  Resp: Mild intermittent cough, appears to be secondary to inability to adequately swallow secretions.    GI- Abdomen soft, non descended, non-tender.  No hepato/splenomegaly appreciated.  Extrem- No pain, swelling noted.    Substiute Decision Making:  -Patient was visited yesterday by his ex-wife, who indicated that Mr. Lock's daughter, Abby/Makayla (sp?) has remained undecided regarding the choice to use pressors for blood pressure support.    -Daughter verbally consented to PEG tube if needed, while speaking to Mr. Lock's night nurse, Kristal.        OBJECTIVE:     Vital Signs Range (Last 24H):  Temp:  [98.1 °F (36.7 °C)-99.4 °F (37.4 °C)]   Pulse:  [68-87]   Resp:  [11-33]   BP: ()/(40-84)   SpO2:  [77 %-100 %]     I & O (Last 24H):    Intake/Output Summary (Last 24 hours) at 07/24/18 1038  Last data filed at 07/24/18 0900   Gross per 24 hour   Intake             2240 ml   Output             2825 ml   Net             -585 ml       Estimated body mass index is 28.36 kg/m² as calculated from the following:    Height as of this encounter: 5' 8" (1.727 m).    Weight as of this encounter: 84.6 kg (186 lb 8.2 " oz).    Physical Exam:  General- Patient alert and oriented x3 in NAD  HEENT- PERRLA, EOMI, OP clear, MMM  Neck- No JVD, Lymphadenopathy, Thyromegaly  CV- Regular rate and rhythm, No Murmur/ovidio/rubs  Resp- Lungs CTA Bilaterally, No increased WOB  GI- Non tender/non-distended, BS normoactive x4 quads, no HSM  Extrem- No cyanosis, clubbing, edema. Pulses 2+ and symmetric  Neuro- Strength: 4/5 flexor/extensor LUE, 0/5 RUE; 4/5 LLE flexor/extensor, 0/5 LLE.  Sensation grossly in tact in upper extremities, and lower extremities at and above knees.  Bilateral lower extremities have no sensation to light/firm touch.    Skin-  No rashes, masses or lesions noted    Laboratory/Diagnostic Data:  Reviewed and noted in plan where applicable- Please see chart for full lab data.    Medications:  Medication list was reviewed and changes noted under Assessment/Plan.    ASSESSMENT/PLAN:     Active Problems:    Active Hospital Problems    Diagnosis  POA    *Cerebrovascular accident (CVA) due to bilateral thrombosis of carotid arteries [I63.033]  Yes    Hypernatremia [E87.0]  No    Acute hypercapnic respiratory failure [J96.02]  No    Encephalopathy, metabolic [G93.41]  Yes    Malnutrition of moderate degree [E44.0]  No    Type 2 diabetes mellitus [E11.9]  Yes    Essential hypertension [I10]  Yes    HLD (hyperlipidemia) [E78.5]  Yes      Resolved Hospital Problems    Diagnosis Date Resolved POA    Leucocytosis [D72.829] 07/17/2018 Yes    S/P carotid endarterectomy [Z98.890] 07/16/2018 Not Applicable       Plan:  Pt seen and examined at length with medical student. Agree with above interval history and exam. Pt with labile BP over last 48 hrs with brief hypotensive episodes from which, fortunately, he doesn't appear to have much clinical consequence. However, will stop metoprolol for now and monitor closely.  In terms of his CVA, continue anti-plt therapy and statin. Much more awake and interactive today and able to answer  certain questions (see med student exam above). However, long-term prognosis has not changed much. Nevertheless, daughter would like to pursue PEG and likely long-term placement.  Off BiPAP currently, but will use prn and nightly. Tolerating tube feeds at present and now with progressive hyperglycemia. Will add some basal insulin and monitor.       VTE Risk Mitigation         Ordered     enoxaparin injection 40 mg  Daily      07/17/18 0929     IP VTE HIGH RISK PATIENT  Once      07/15/18 4693

## 2018-07-24 NOTE — PROGRESS NOTES
[facility]  Adult Nutrition  Consult Note    SUMMARY     Calorie count initiated per MD discussion in rounds. Follow up tomorrow for daily total of oral intake. General intake 25% of breakfast and 75% lunch.  Current TF intake is 2016 calories, 101 gms protein.  Consider decreasing TF rate to 35 mls/hr during oral intake trial.    Recommendations    1) Enteral: Continue Diabetasource AC @  70 mls/hr.. Flush with 30 mls water Q 4 hrs or per MD. Hold if residual >250 mls.  Provides 2016 calories, 101 gms protein, 168 gms CHO, 1374 mls free water.    2) When medically appropriate: ADAT to diabetic 2000 calorie, cardiac diet with texture per SLP.     Goals: 1) Pt will receive nutrition within 72 hrs. 2) Pt will progress to =>85% EEN within 48 hrs.   Nutrition Goal Status: 1) Met 2) Met/ongoing  Communication of RD Recs:  (POC)    Reason for Assessment    Reason for Assessment: RD follow up  1. Thrombotic stroke    2. Stroke    3. Cerebrovascular accident (CVA) due to bilateral thrombosis of carotid arteries    4. Alteration in blood pressure      Past Medical History:   Diagnosis Date    COPD (chronic obstructive pulmonary disease)     Depression     Diabetes mellitus     Hypertension     Stroke      Interdisciplinary Rounds: attended  General Information Comments: Admitted with rt sided weakness, slurred speech, AMS.  Pt not alert. Family/friend not at bedside.  Obtained information from chart and nursing.  NFPE incomplete.   7/18/18: Pt not alert. Family present. NFPE completed. No significant fat or muscle loss noted. Enteral feedings have been started, however, pt pulled the NGT out.  Bolus feeding rec provided.   7/19/18: Pt now on bipap. Enteral feedings are limited 2/2 bipap. See recs above.  Nutrition related renal labs are wnl. Energy needs adjusted accordingly.   7/23/18: Pt now on continuous feedings at goal. 10 mls residual.     Nutrition Risk Screen    Nutrition Risk Screen: other  "(see comments) (c/o stomach and sinus problems)    Nutrition/Diet History    Do you have any cultural, spiritual, Orthodox conflicts, given your current situation?: none reported   Food Allergies: NKFA    Anthropometrics    Temp: 99.4 °F (37.4 °C)  Height Method: Estimated  Height: 5' 8"  Height (inches): 68 in  Weight Method: Bed Scale  Weight: 84.6 kg (186 lb 8.2 oz)  Weight (lb): 186.51 lb  Ideal Body Weight (IBW), Male: 154 lb  % Ideal Body Weight, Male (lb): 117.53 lb  BMI (Calculated): 27.6  BMI Grade: 25 - 29.9 - overweight  Usual Body Weight (UBW), k.6 kg (per chart review 18)  % Usual Body Weight: 100.82  % Weight Change From Usual Weight: 0.61 %       Lab/Procedures/Meds    Pertinent Labs Reviewed: reviewed  Lab Results   Component Value Date    ALBUMIN 2.8 (L) 2018     Lab Results   Component Value Date    WBC 14.30 (H) 2018     BMP  Lab Results   Component Value Date     2018    K 3.4 (L) 2018     2018    CO2 27 2018    BUN 21 2018    CREATININE 1.2 2018    CALCIUM 8.5 (L) 2018    ANIONGAP 11 2018    ESTGFRAFRICA >60 2018    EGFRNONAA >60 2018     Lab Results   Component Value Date    CALCIUM 8.5 (L) 2018    PHOS 3.2 2018     Pertinent Medications Reviewed: reviewed  Scheduled Meds:   aspirin  300 mg Rectal Daily    atorvastatin  40 mg Per NG tube QHS    busPIRone  5 mg Per NG tube Daily    cloNIDine 0.3 mg/24 hr td ptwk  1 patch Transdermal Q7 Days    clopidogrel  75 mg Per NG tube Daily    enoxaparin  40 mg Subcutaneous Daily    metoprolol succinate  50 mg Oral Daily    paroxetine  40 mg Per NG tube QAM    piperacillin-tazobactam (ZOSYN) IVPB  4.5 g Intravenous Q8H    polyethylene glycol  17 g Oral Daily    sodium chloride 0.9%  3 mL Intravenous Q8H    traZODone  150 mg Per NG tube QHS     Continuous Infusions:   norepinephrine bitartrate-D5W         Physical " Findings/Assessment    Overall Physical Appearance:  (appears larger than BMI suggests)  Oral/Mouth Cavity: tooth/teeth missing  Skin:  (James score 13)    Estimated/Assessed Needs    Weight Used For Calorie Calculations: 82.1 kg (181 lb)  Radha Becerra: 1561x1.25=1953        Weight Used For Protein Calculations: 69.9 kg (154 lb) (IBW)   1.2-2.0 gm/kg/day:      Fluid Need Method: RDA Method     CHO Requirement: 45-50% EEN or <5 GIR      Nutrition Prescription Ordered    Current Diet Order: NPO  Nutrition Order Comments: per SLP rec    Evaluation of Received Nutrient/Fluid Intake    Diabetasource @ 70 mls/hr. Flushes 30 mls Q 4 hrs.   Provides 2016 calories, 101 gms protein, 168 gms CHO, 1374 mls free water. Flushes provide 720 mls/24 hrs.    Energy Calories Required:  meeting needs  Protein Required:  meeting needs  Fluid Required:  (per primary team)    Intake/Output Summary (Last 24 hours) at 07/24/18 0953  Last data filed at 07/24/18 0900   Gross per 24 hour   Intake             2240 ml   Output             2825 ml   Net             -585 ml   % Intake of Estimated Energy Needs: %  % Meal Intake: NPO    Nutrition Risk    Level of Risk/Frequency of Follow-up:  (1 x wkly)     Assessment and Plan    Inadequate energy intake r/t inability to consume sufficient energy as evidenced by NPO with no alternative nutrition  Resolved    Monitor and Evaluation    Food and Nutrient Intake: energy intake  Food and Nutrient Adminstration: diet order  Anthropometric Measurements: weight, weight change  Biochemical Data, Medical Tests and Procedures: electrolyte and renal panel, glucose/endocrine profile, inflammatory profile  Nutrition-Focused Physical Findings: overall appearance, skin     Nutrition Follow-Up  1 x wkly    Discharge Plan    Enteral feedings as above  RD Follow-up?: Yes

## 2018-07-24 NOTE — PLAN OF CARE
Problem: Occupational Therapy Goal  Goal: Occupational Therapy Goal  Updated Goals to be met by: 8/3/18     Patient will increase functional independence with ADLs by performing:    Grooming while in supported sitting with Minimal Assistance and Assistive Devices as needed.  Sitting at edge of bed x5 minutes with Moderate Assistance and use of upper extremity support.  R hand and forearm to be elevated/supported on pillows to prevent edema and shoulder joint subluxation.      Goals to be met by: 7/29/18     Patient will increase functional independence with ADLs by performing:    Grooming while in supported sitting with Minimal Assistance and Assistive Devices as needed.  Sitting at edge of bed x5 minutes with Moderate Assistance and use of upper extremity support.  R hand and forearm to be elevated/supported on pillows to prevent edema and shoulder joint subluxation.      Outcome: Ongoing (interventions implemented as appropriate)  Pt progressing towards goals. Continue OT POC.     RAYMUNDO Jeffries  7/24/2018  I certify that I was present in the room directing the student in service delivery and guiding them using my skilled judgment. As the co-signing therapist I have reviewed the students documentation and am responsible for the treatment, assessment, and plan.     ANDREW Ahuja  7/24/2018

## 2018-07-24 NOTE — PLAN OF CARE
Possible phlebitis or infiltrated IV site left forearm, with approximately 2 inches of periwound erythema.  Local care orders written.

## 2018-07-24 NOTE — NURSING
Dr Rothman aware of consult for PEG and at pts bedside.  Pt received plavix today.  recs to hold plavix tomorrow. Ok to give lovenox dose today and ASA tomorrow.    Dr Rothman would like to be notified when daughter arrives tomorrow to speak with her regarding PEG placement.

## 2018-07-25 PROBLEM — N17.9 AKI (ACUTE KIDNEY INJURY): Status: RESOLVED | Noted: 2018-07-15 | Resolved: 2018-07-25

## 2018-07-25 PROBLEM — R13.10 DYSPHAGIA: Status: ACTIVE | Noted: 2018-07-25

## 2018-07-25 LAB
POCT GLUCOSE: 245 MG/DL (ref 70–110)
POCT GLUCOSE: 252 MG/DL (ref 70–110)
POCT GLUCOSE: 259 MG/DL (ref 70–110)
POCT GLUCOSE: 266 MG/DL (ref 70–110)
POCT GLUCOSE: 304 MG/DL (ref 70–110)
POCT GLUCOSE: 311 MG/DL (ref 70–110)

## 2018-07-25 PROCEDURE — 25000003 PHARM REV CODE 250: Performed by: HOSPITALIST

## 2018-07-25 PROCEDURE — 25000003 PHARM REV CODE 250: Performed by: INTERNAL MEDICINE

## 2018-07-25 PROCEDURE — 63600175 PHARM REV CODE 636 W HCPCS: Performed by: INTERNAL MEDICINE

## 2018-07-25 PROCEDURE — 94761 N-INVAS EAR/PLS OXIMETRY MLT: CPT

## 2018-07-25 PROCEDURE — 97112 NEUROMUSCULAR REEDUCATION: CPT

## 2018-07-25 PROCEDURE — G8996 SWALLOW CURRENT STATUS: HCPCS | Mod: CL

## 2018-07-25 PROCEDURE — A4216 STERILE WATER/SALINE, 10 ML: HCPCS | Performed by: INTERNAL MEDICINE

## 2018-07-25 PROCEDURE — G8997 SWALLOW GOAL STATUS: HCPCS | Mod: CI

## 2018-07-25 PROCEDURE — 92523 SPEECH SOUND LANG COMPREHEN: CPT

## 2018-07-25 PROCEDURE — 92610 EVALUATE SWALLOWING FUNCTION: CPT

## 2018-07-25 PROCEDURE — 27000221 HC OXYGEN, UP TO 24 HOURS

## 2018-07-25 PROCEDURE — 97110 THERAPEUTIC EXERCISES: CPT

## 2018-07-25 PROCEDURE — 20000000 HC ICU ROOM

## 2018-07-25 PROCEDURE — 99232 SBSQ HOSP IP/OBS MODERATE 35: CPT | Mod: ,,, | Performed by: INTERNAL MEDICINE

## 2018-07-25 PROCEDURE — 63600175 PHARM REV CODE 636 W HCPCS: Performed by: HOSPITALIST

## 2018-07-25 PROCEDURE — 97530 THERAPEUTIC ACTIVITIES: CPT

## 2018-07-25 RX ADMIN — SODIUM CHLORIDE, PRESERVATIVE FREE 3 ML: 5 INJECTION INTRAVENOUS at 09:07

## 2018-07-25 RX ADMIN — PIPERACILLIN SODIUM AND TAZOBACTAM SODIUM 4.5 G: 4; .5 INJECTION, POWDER, FOR SOLUTION INTRAVENOUS at 04:07

## 2018-07-25 RX ADMIN — INSULIN ASPART 3 UNITS: 100 INJECTION, SOLUTION INTRAVENOUS; SUBCUTANEOUS at 04:07

## 2018-07-25 RX ADMIN — ENOXAPARIN SODIUM 40 MG: 100 INJECTION SUBCUTANEOUS at 04:07

## 2018-07-25 RX ADMIN — TRAZODONE HYDROCHLORIDE 150 MG: 50 TABLET ORAL at 09:07

## 2018-07-25 RX ADMIN — PIPERACILLIN SODIUM AND TAZOBACTAM SODIUM 4.5 G: 4; .5 INJECTION, POWDER, FOR SOLUTION INTRAVENOUS at 12:07

## 2018-07-25 RX ADMIN — PAROXETINE HYDROCHLORIDE HEMIHYDRATE 40 MG: 20 TABLET, FILM COATED ORAL at 06:07

## 2018-07-25 RX ADMIN — CLONIDINE 1 PATCH: 0.3 PATCH TRANSDERMAL at 09:07

## 2018-07-25 RX ADMIN — INSULIN DETEMIR 5 UNITS: 100 INJECTION, SOLUTION SUBCUTANEOUS at 09:07

## 2018-07-25 RX ADMIN — ASPIRIN 300 MG: 300 SUPPOSITORY RECTAL at 08:07

## 2018-07-25 RX ADMIN — BUSPIRONE HYDROCHLORIDE 5 MG: 5 TABLET ORAL at 08:07

## 2018-07-25 RX ADMIN — SODIUM CHLORIDE, PRESERVATIVE FREE 3 ML: 5 INJECTION INTRAVENOUS at 06:07

## 2018-07-25 RX ADMIN — SODIUM CHLORIDE, PRESERVATIVE FREE 3 ML: 5 INJECTION INTRAVENOUS at 02:07

## 2018-07-25 RX ADMIN — INSULIN ASPART 3 UNITS: 100 INJECTION, SOLUTION INTRAVENOUS; SUBCUTANEOUS at 11:07

## 2018-07-25 RX ADMIN — INSULIN ASPART 4 UNITS: 100 INJECTION, SOLUTION INTRAVENOUS; SUBCUTANEOUS at 06:07

## 2018-07-25 RX ADMIN — PIPERACILLIN SODIUM AND TAZOBACTAM SODIUM 4.5 G: 4; .5 INJECTION, POWDER, FOR SOLUTION INTRAVENOUS at 09:07

## 2018-07-25 RX ADMIN — INSULIN ASPART 1 UNITS: 100 INJECTION, SOLUTION INTRAVENOUS; SUBCUTANEOUS at 09:07

## 2018-07-25 RX ADMIN — ATORVASTATIN CALCIUM 40 MG: 40 TABLET, FILM COATED ORAL at 09:07

## 2018-07-25 NOTE — PLAN OF CARE
Problem: Patient Care Overview  Goal: Plan of Care Review  Pt progressing.  Remains in ICU.  Free from injury.  Rothman removed.  Pt incontinent.  +BM.  Updated POC discussed with pt and family.  Diet advanced.  Tolerated some supper.  Pt total feed.  More alert today than previously.  Oxygen remains.  Tolerated oob to chair.  Restraint to left wrist remains.  CBGs every 6 hrs with sliding scale coverage.

## 2018-07-25 NOTE — PLAN OF CARE
Spoke with pt's daughter outside of pt's room to update her on the plan for pt to be evaluated by rehab tomorrow.  We discussed the limitation for services that pt had secondary to pt not having part B medicare.  Daughter stated that even before pt had medicare [facility] and [facility] and he never felt that he needed part B Medicare.  Explained to the daughter why it would be beneficial and she was in agreement.  Daughter stated that she would call medicare to see how much it would cost for pt to get part B.       07/25/18 1600   Discharge Reassessment   Assessment Type Discharge Planning Reassessment

## 2018-07-25 NOTE — NURSING
Pts mentation improving.  Still confused, oriented to self only.  No distress no c/o.  Tolerating feedings.      PT at bedside at this time.      Rehab consult called to kaz Chahal [phone].  Informed plan for pt to have PEG placed poss Friday so transfer would most likely be after placement if accepted.    1100:  Pt back to bed from chair with PT.  Large loose BM,  Cleaned and settled in bed.  Speech at bedside

## 2018-07-25 NOTE — PT/OT/SLP EVAL
Speech Language Pathology Evaluation  Cognitive/Bedside Swallow    Patient Name:  Kel Lock   MRN:  [id number]  Admitting Diagnosis: Cerebrovascular accident (CVA) due to bilateral thrombosis of carotid arteries    Recommendations:                  General Recommendations:  Dysphagia therapy and Cognitive-linguistic therapy  Diet recommendations:  Puree, Nectar Thick   Aspiration Precautions: Assistance with meals and Assistance with thickening liquids, Check for pocketing/oral residue, Feed only when awake/alert, Frequent oral care, HOB to 90 degrees, Ice chips sparingly, Meds crushed in puree, Monitor for s/s of aspiration, No straws, Remain upright 30 minutes post meal, Small bites/sips, Strict aspiration precautions and Wear oxygen during intake   General Precautions: Standard, fall, pureed diet, nectar thick  Communication strategies:  provide increased time to answer and go to room if call light pushed    History:     Past Medical History:   Diagnosis Date    COPD (chronic obstructive pulmonary disease)     Depression     Diabetes mellitus     Hypertension     Stroke        Past Surgical History:   Procedure Laterality Date    carpal tunel surgery Bilateral     coronary artery endartarectomy      FRACTURE SURGERY      SHOULDER SURGERY Right        Social History: Patient lives with family.    Prior Intubation HX:  None this admit    Modified Barium Swallow: None in EPIC    Chest X-Rays: Lungs are expanded and clear.  Cardiomediastinal silhouette does not appear significantly changed with the thoracic aorta tortuous and probably ectatic/dilated as well. 7/17/18    Prior diet: Presumed regular textures and thin liquids.    Subjective     I'd like some ice, pretty please please please  Patient goals: Go to the deli    Objective:   Pt was reclined in bed on arrival. HOB raised to 75. Pt required prompts to stay awake until PO trials were introduced. No dysarthria.     Cognitive Status:     Arousal/Alertness Delayed response to stimuli mild delay, Delayed responses moderate delay and Inconsistent responses .  Attention Sustained attention deficit dependent on level of alertness  Perseveration Present-verbal mild deficit  Orientation Person  Memory long term recall word finding problems       Receptive Language:   Comprehension:      Picture identification 4 in Fo 5 with significant delays    Pragmatics:    initiation wants and needs and maintenance significantly decreased    Expressive Language:  Verbal:    Automatic Speech  Counting 1-10 required cue to start, delays and pauses  and Days of the week required phonemic cueing and significant prompts to complete  Initiation humorous comments made throughout evaluation  Repetition Words 5 syllables  Sentence formulation good for short sentences to meet needs      Motor Speech:  Intelligibility 75%, improved after introduction of PO trials    Voice:   Quality initially wet, cleared given cues; grumbly, low volume  Intensity low volume    Visual-Spatial:  Limited testing    Reading:   Word struggle, occassional paraphasias with self-recognition, eventually read 5 out of 6 words correctly     Written Expression:   DNT    Oral Musculature Evaluation  · Oral Musculature: right weakness  · Dentition: scattered dentition, teeth in poor condition  · Secretion Management: adequate  · Mucosal Quality: dry  · Mandibular Strength and Mobility: WFL  · Oral Labial Strength and Mobility: impaired retraction (right side weakness)  · Lingual Strength and Mobility: WFL  · Velar Elevation:  (unable to assess d/t pt not opening mouth wide)  · Buccal Strength and Mobility: WFL  · Voice Prior to PO Intake: wet , cleared given cues; intermittently rough    Bedside Swallow Eval:   Consistencies Assessed:  · Thin liquids via ice chips, cup sip  · Nectar thick liquids via spoon, cup sip, and straw  · Puree via spoon, applesauce     Oral Phase:   · Anterior loss  · Slow oral  transit time   · Right anterior loss on ice chips only    Pharyngeal Phase:   Ice chips x4, delayed pharyngeal swallow, decreased laryngeal rise  Thin cup sip followed by throat clearing, decreased laryngeal rise  Nectar thick spoon cup and straw & puree trials no s/s penetration or aspiration, clear voice after each trial    Compensatory Strategies  · None    Treatment: Educ re findings, assessment, and treatment recommendations. See plan of care for details.     Assessment:     Kel Lock is a 67 y.o. male with an SLP diagnosis of Dysphagia and Cognitive-Linguistic Impairment.  He presented with mild oral dysphagia, and s/s of pharyngeal dysphagia on thin liquids by cup. Tolerated purees and nectar thick liquids w no s/s penetration or aspiration. Moderate to severe cognitive-linguistic deficits complicated by lethargy. REC upgrade diet to puree w nectar thick liquids. Initiate cognitive-linguistic treatment. Continue plan of care.     Goals:    SLP Goals        Problem: SLP Goal    Goal Priority Disciplines Outcome   SLP Goal     SLP Ongoing (interventions implemented as appropriate)   Description:  1. Ongoing BSS - MET  2. Complete cognitive-linguistic evaluation - MET  3. Orientation x 4 SBA, 90% accuracy/  4. Automatics 100% independently.   5. Follow 3-step commands, 90% accuracy, independently.   6. Demonstrate comprehension of 3-sentence paragraph, 75% accuracy, independently.                     Plan:     · Patient to be seen:  5 x/week   · Plan of Care expires:     · Plan of Care reviewed with:  patient   · SLP Follow-Up:  Yes       Discharge recommendations:  Discharge Facility/Level Of Care Needs: rehabilitation facility   Barriers to Discharge:  None    Time Tracking:     SLP Treatment Date:   07/25/18  Speech Start Time:  1057  Speech Stop Time:  1145     Speech Total Time (min):  48 min    Billable Minutes: Eval 30 , Eval Swallow and Oral Function 18 and Total Time 48    Araceli Andino  CCC-SLP/A  07/25/2018

## 2018-07-25 NOTE — PT/OT/SLP PROGRESS
"Physical Therapy Treatment    Patient Name:  Kel Lock   MRN:  [id number]    Recommendations:     Discharge Recommendations:  rehabilitation facility   Discharge Equipment Recommendations:  (TBD)   Barriers to discharge: Decreased caregiver support    Assessment:     Kel Lock is a 67 y.o. male admitted with a medical diagnosis of Cerebrovascular accident (CVA) due to bilateral thrombosis of carotid arteries.  He presents with the following impairments/functional limitations:  weakness, impaired endurance, impaired self care skills, impaired functional mobilty, impaired balance, impaired cognition, decreased lower extremity function, decreased safety awareness, decreased upper extremity function, impaired cardiopulmonary response to activity . Pt is more cooperative and verbally appropriate/calmer and not pulling on lines. Excellent rehab candidate    Rehab Prognosis:  fair; patient would benefit from acute skilled PT services to address these deficits and reach maximum level of function.      Recent Surgery: * No surgery found *      Plan:     During this hospitalization, patient to be seen daily to address the above listed problems via therapeutic activities, therapeutic exercises, neuromuscular re-education  · Plan of Care Expires:  08/31/18   Plan of Care Reviewed with: patient    Subjective     Communicated with nurse ambriz prior to session.  Patient found seated in chair upon PT entry to room, agreeable to treatment.    Pt revisited prior to PT leaving floor  Pt sweaty- stated of being "hot"  Pt also with liquid BM seeping through diaper to floor  Nurse Ambriz came in and temperature, sugar check done but all ok  /77 to 107/67    Chief Complaint: c/o being sweaty and hot  Patient comments/goals: none stated  Pain/Comfort:  · Pain Rating 1: 0/10    Patients cultural, spiritual, Yazidi conflicts given the current situation: none reported     Objective:     Patient found with: PICC line, telemetry, " NG tube, blood pressure cuff, nair catheter, restraints     General Precautions: Standard, fall   Orthopedic Precautions:N/A   Braces: N/A     Functional Mobility:  · Bed Mobility:     · Rolling Left:  maximal assistance  · Rolling Right: moderate assistance  · Scooting: total assistance  · Sit to Supine: total assistance  · Transfers:     · Sit to Stand:  maximal assistance and of 2 persons with hand-held assist      AM-PAC 6 CLICK MOBILITY  Turning over in bed (including adjusting bedclothes, sheets and blankets)?: 2  Sitting down on and standing up from a chair with arms (e.g., wheelchair, bedside commode, etc.): 2  Moving from lying on back to sitting on the side of the bed?: 2  Moving to and from a bed to a chair (including a wheelchair)?: 2  Need to walk in hospital room?: 1  Climbing 3-5 steps with a railing?: 1  Basic Mobility Total Score: 10       Therapeutic Activities and Exercises:   pt tolerated 30 min OOB- limited by BM/low BP  Pt worked on chair to bed transfers assist x2  PT assisted nurse almaraz with hygiene care  Pt repositioned back supine assist x2    Patient left HOB elevated with all lines intact, call button in reach and nurse rufina present..    GOALS:    Physical Therapy Goals        Problem: Physical Therapy Goal    Goal Priority Disciplines Outcome Goal Variances Interventions   Physical Therapy Goal     PT/OT, PT Ongoing (interventions implemented as appropriate)     Description:  Goals to be met by: 2018     Patient will increase functional independence with mobility by performin. Supine to sit with Maximum Assistance  2. Rolling to Left and Right with Moderate Assistance.  3. Sitting at edge of bed x15 minutes with Maximum Assistance  4. Increased functional strength to 2/5 for RLE    .  Goals to be met by: 08/10/2018     Patient will increase functional independence with mobility by performin. Pt will be able to tolerate Supine.  2. Pt will be able to tolerate Sit to  supine  3. Pt will be able to tolerate Sit to stand transfer   4. Pt will be able to tolerate Bed to chair transfer with most appropriate device.  5. Pt will be able to tolerate Gait  Activity using most appropriate device.     Goal edited 07/19/2018: Goals to be met 08/24/2018.    1. Pt will be able to tolerate supine<>sit mod A.  2. Pt will perform supine BLE exercises (actively/passively) x 10-20 as tolerated by patient.   3. Pt will tolerate sit<>stand, stand pivot transfers max A.                         Time Tracking:     PT Received On: 07/25/18  PT Start Time: 1019     PT Stop Time: 1050  PT Total Time (min): 31 min     Billable Minutes: Therapeutic Activity 31    Treatment Type: Treatment  PT/PTA: PT     PTA Visit Number: 0     Mariel Garza, PT  07/25/2018

## 2018-07-25 NOTE — PLAN OF CARE
07/24/18 2100   Patient Assessment/Suction   Level of Consciousness (AVPU) alert   PRE-TX-O2-ETCO2   O2 Device (Oxygen Therapy) nasal cannula   Flow (L/min) 3   Oxygen Concentration (%) 32   SpO2 97 %   Pulse Oximetry Type Continuous   Pulse 75   Resp (!) 32   BP (!) 152/72   Ready to Wean/Extubation Screen   FIO2<=50 (chart decimal) 0.32   Preset CPAP/BiPAP Settings   Mode Of Delivery Standby

## 2018-07-25 NOTE — PROGRESS NOTES
"[facility] Note    CC: Dysphagia    HPI 67 y.o. male admitted with CVA, now with acute, moderate to severe dysphagia s/p NG tube placement. He is more awake today.     Past Medical History:   Diagnosis Date    COPD (chronic obstructive pulmonary disease)     Depression     Diabetes mellitus     Hypertension     Stroke          Review of Systems  Unable to obtain due to recent CVA    Physical Examination  BP (!) 146/67   Pulse 66   Temp 98.4 °F (36.9 °C) (Axillary)   Resp 19   Ht 5' 8" (1.727 m)   Wt 84.6 kg (186 lb 8.2 oz)   SpO2 98%   BMI 28.36 kg/m²   General appearance: alert, cooperative, no distress  HENT: Normocephalic, atraumatic, neck symmetrical, no nasal discharge, sclera anicteric , NG tube in place  Lungs: coarse breath sounds bilaterally, symmetric chest wall expansion bilaterally  Heart: regular rate and rhythm without rub; no displacement of the PMI   Abdomen: soft, nontender,nondistended  Extremities: extremities symmetric; no clubbing, cyanosis, or edema        Labs:  Lab Results   Component Value Date    WBC 14.30 (H) 07/21/2018    HGB 13.0 (L) 07/21/2018    HCT 40.7 07/21/2018    MCV 93 07/21/2018     (L) 07/21/2018         Assessment:   67 y.o. male with recent CVA on Plavix and Aspirin now with dysphagia. GI consulted for PEG placement. Patient much more alert today    Plan:  -Continue to hold Plavix  -Ok for Aspirin and Lovenox/Heparin if needed   -Will plan for PEG Monday if patient still requires (he is much more awake today and speech has recommended diet)- please hold Plavix and Lovenox/Heparin Monday morning       Adrienne Arbour Carona, MD [facility]  [address], [address]  [place] [address]  Office: [phone]  Fax: [phone]  "

## 2018-07-25 NOTE — NURSING
Pt tolerating supper.  Daughter at bedside assisting in feeds, this nurse supervising.  Ate appr 10-15%.  Had complaints of sore throat.  Tube feedings held at this time while eating.

## 2018-07-25 NOTE — PROGRESS NOTES
07/25/18 0708   Patient Assessment/Suction   Level of Consciousness (AVPU) alert   PRE-TX-O2-ETCO2   O2 Device (Oxygen Therapy) nasal cannula   $ Is the patient on Low Flow Oxygen? Yes   Flow (L/min) 3   SpO2 97 %   Pulse Oximetry Type Continuous   $ Pulse Oximetry - Multiple Charge Pulse Oximetry - Multiple   Pulse 71   Resp (!) 21   Preset CPAP/BiPAP Settings   Mode Of Delivery Standby

## 2018-07-25 NOTE — PLAN OF CARE
Problem: Physical Therapy Goal  Goal: Physical Therapy Goal  Goals to be met by: 2018     Patient will increase functional independence with mobility by performin. Supine to sit with Maximum Assistance  2. Rolling to Left and Right with Moderate Assistance.  3. Sitting at edge of bed x15 minutes with Maximum Assistance  4. Increased functional strength to 2/5 for RLE    .  Goals to be met by: 08/10/2018     Patient will increase functional independence with mobility by performin. Pt will be able to tolerate Supine.  2. Pt will be able to tolerate Sit to supine  3. Pt will be able to tolerate Sit to stand transfer   4. Pt will be able to tolerate Bed to chair transfer with most appropriate device.  5. Pt will be able to tolerate Gait  Activity using most appropriate device.     Goal edited 2018: Goals to be met 2018.    1. Pt will be able to tolerate supine<>sit mod A.  2. Pt will perform supine BLE exercises (actively/passively) x 10-20 as tolerated by patient.   3. Pt will tolerate sit<>stand, stand pivot transfers max A.        Outcome: Ongoing (interventions implemented as appropriate)  Pt seen at ICU- for PEG on Friday per nurse Ambriz. Pt much more alert and interactive, following directions well.. EOB sitting assist x2  with improved ability to sustain static sitting balance. OOB to chair assist x2- squat pivot. Nurse Ambriz at bedside. Excellent rehab candidate

## 2018-07-25 NOTE — PLAN OF CARE
Problem: Patient Care Overview  Goal: Plan of Care Review  Outcome: Ongoing (interventions implemented as appropriate)  Pt sleeping between care. Free of fall/injury and denies pain or n/v. Large BMs during shift x4. Rothman intact with adequate output. Daughter to see Dr. Rothman during rounds today to discuss PEG tube placement.

## 2018-07-25 NOTE — PT/OT/SLP PROGRESS
"Physical Therapy Treatment    Patient Name:  Kel Lock   MRN:  [id number]    Recommendations:     Discharge Recommendations:  rehabilitation facility   Discharge Equipment Recommendations:  (TBD)   Barriers to discharge: Decreased caregiver support    Assessment:     Kel Lock is a 67 y.o. male admitted with a medical diagnosis of Cerebrovascular accident (CVA) due to bilateral thrombosis of carotid arteries.  He presents with the following impairments/functional limitations:  weakness, impaired endurance, impaired self care skills, impaired functional mobilty, impaired balance, impaired cognition, decreased lower extremity function, decreased safety awareness, decreased upper extremity function, impaired cardiopulmonary response to activity . Pt seen at ICU- much more alert/interactive and attempting to follow directions. Pt now able to sustain static sitting balance and using LUE for support/ balance. Still forward head and trunk posture. LUE requiring restraints for safety due to pulling on lines, Pt tolerated increased OOB to chair today. Pt is an excellent rehab candidate.    Rehab Prognosis:  fair; patient would benefit from acute skilled PT services to address these deficits and reach maximum level of function.      Recent Surgery: * No surgery found *      Plan:     During this hospitalization, patient to be seen daily to address the above listed problems via therapeutic activities, therapeutic exercises, neuromuscular re-education  · Plan of Care Expires:  08/31/18   Plan of Care Reviewed with: patient    Subjective     Communicated with nurse Ambriz prior to session.  Patient found supine upon PT entry to room, agreeable to treatment.    NG tubing for nutrition- pt for PEG on Friday per nurse Ambriz  Pt stated " stimulating" when asked about how he felt being OOB  Pt attempting to look midline and to R side    Chief Complaint: none  Patient comments/goals: none stated  Pain/Comfort:  · Pain Rating 1: " 0/10    Patients cultural, spiritual, Nondenominational conflicts given the current situation: none reported     Objective:     Patient found with: PICC line, telemetry, NG tube, blood pressure cuff, nair catheter, restraints     General Precautions: Standard, fall   Orthopedic Precautions:N/A   Braces: N/A     Functional Mobility: 2 people assist for safety  · Bed Mobility:     · Rolling Left:  maximal assistance  · Scooting: maximal assistance  · Supine to Sit: maximal assistance and of 2 persons  · Transfers:     · Sit to Stand:  maximal assistance with hand-held assist  · Bed to Chair: maximal assistance and of 2 persons with  hand-held assist  using  Squat Pivot  · Balance: fair- static sitting balance, poor standing      AM-PAC 6 CLICK MOBILITY  Turning over in bed (including adjusting bedclothes, sheets and blankets)?: 2  Sitting down on and standing up from a chair with arms (e.g., wheelchair, bedside commode, etc.): 2  Moving from lying on back to sitting on the side of the bed?: 2  Moving to and from a bed to a chair (including a wheelchair)?: 2  Need to walk in hospital room?: 1  Climbing 3-5 steps with a railing?: 1  Basic Mobility Total Score: 10       Therapeutic Activities and Exercises:   thera ex with GS/QS x 10  L SLR x10 actively  EOB sitting with assist x2 with improved sitting balance to CGA once midline position achieved- pt utilizing LUE for support and balance  Pt progressed to OOB to chair assist x2- repositioned- pt attempting to assist with scooting/pillows utilized for support  Nurse ambriz at bedside      Patient left up in chair with all lines intact, call button in reach, restraints reapplied at end of session and nurse Ambriz present..    GOALS:    Physical Therapy Goals        Problem: Physical Therapy Goal    Goal Priority Disciplines Outcome Goal Variances Interventions   Physical Therapy Goal     PT/OT, PT Ongoing (interventions implemented as appropriate)     Description:  Goals to be  met by: 2018     Patient will increase functional independence with mobility by performin. Supine to sit with Maximum Assistance  2. Rolling to Left and Right with Moderate Assistance.  3. Sitting at edge of bed x15 minutes with Maximum Assistance  4. Increased functional strength to 2/5 for RLE    .  Goals to be met by: 08/10/2018     Patient will increase functional independence with mobility by performin. Pt will be able to tolerate Supine.  2. Pt will be able to tolerate Sit to supine  3. Pt will be able to tolerate Sit to stand transfer   4. Pt will be able to tolerate Bed to chair transfer with most appropriate device.  5. Pt will be able to tolerate Gait  Activity using most appropriate device.     Goal edited 2018: Goals to be met 2018.    1. Pt will be able to tolerate supine<>sit mod A.  2. Pt will perform supine BLE exercises (actively/passively) x 10-20 as tolerated by patient.   3. Pt will tolerate sit<>stand, stand pivot transfers max A.                         Time Tracking:     PT Received On: 18  PT Start Time: 924     PT Stop Time: 951  PT Total Time (min): 27 min     Billable Minutes: Therapeutic Activity 9, Therapeutic Exercise 8 and Neuromuscular Re-education 10    Treatment Type: Treatment  PT/PTA: PT     PTA Visit Number: 0     Mariel Garza, PT  2018

## 2018-07-25 NOTE — NURSING
1300:  Dr Stephan solano.  Cont to hold plavix.  Ok for ASA.  Possible PEG Monday unless may be avoided.  Pt more awake/alert than previous.  Phys to s/c daughter     Right shoulder IV removed, tip in tact and site covered.  Wound care to left arm wounds per flowsheet.      S/c speech.  rec pureed with nectar thick liquids, crush meds in puree.  Ice chips ok and assistance with meals.  Keep NGT until at least tomorrow    1320:  Bladder training in progress    1430:  Daughter at bedside.  Updated on pt status and POC.  Questions and concerns addressed.  Will keep tentative date for PEG for Monday, depending on how well pt eating/swallowing and cont holding plavix.

## 2018-07-25 NOTE — PLAN OF CARE
Problem: SLP Goal  Goal: SLP Goal  1. Ongoing BSS - MET  2. Complete cognitive-linguistic evaluation - MET  3. Orientation x 4 SBA, 90% accuracy/  4. Automatics 100% independently.   5. Follow 3-step commands, 90% accuracy, independently.   6. Demonstrate comprehension of 3-sentence paragraph, 75% accuracy, independently.   Outcome: Ongoing (interventions implemented as appropriate)  Completed BSS. REC pureed textures and nectar thick liquids (no straws).  Completed speech/language eval. REC treatment.

## 2018-07-26 LAB
POCT GLUCOSE: 152 MG/DL (ref 70–110)
POCT GLUCOSE: 220 MG/DL (ref 70–110)
POCT GLUCOSE: 262 MG/DL (ref 70–110)
POCT GLUCOSE: 311 MG/DL (ref 70–110)

## 2018-07-26 PROCEDURE — 27000221 HC OXYGEN, UP TO 24 HOURS

## 2018-07-26 PROCEDURE — 92526 ORAL FUNCTION THERAPY: CPT

## 2018-07-26 PROCEDURE — 20000000 HC ICU ROOM

## 2018-07-26 PROCEDURE — 25000003 PHARM REV CODE 250: Performed by: INTERNAL MEDICINE

## 2018-07-26 PROCEDURE — 92611 MOTION FLUOROSCOPY/SWALLOW: CPT

## 2018-07-26 PROCEDURE — 97803 MED NUTRITION INDIV SUBSEQ: CPT

## 2018-07-26 PROCEDURE — G8996 SWALLOW CURRENT STATUS: HCPCS | Mod: CK

## 2018-07-26 PROCEDURE — 97530 THERAPEUTIC ACTIVITIES: CPT

## 2018-07-26 PROCEDURE — 63600175 PHARM REV CODE 636 W HCPCS: Performed by: INTERNAL MEDICINE

## 2018-07-26 PROCEDURE — 63600175 PHARM REV CODE 636 W HCPCS: Performed by: HOSPITALIST

## 2018-07-26 PROCEDURE — 94761 N-INVAS EAR/PLS OXIMETRY MLT: CPT

## 2018-07-26 PROCEDURE — 25000003 PHARM REV CODE 250: Performed by: HOSPITALIST

## 2018-07-26 PROCEDURE — A4216 STERILE WATER/SALINE, 10 ML: HCPCS | Performed by: INTERNAL MEDICINE

## 2018-07-26 PROCEDURE — G8997 SWALLOW GOAL STATUS: HCPCS | Mod: CJ

## 2018-07-26 RX ORDER — PAROXETINE HYDROCHLORIDE 20 MG/1
40 TABLET, FILM COATED ORAL EVERY MORNING
Status: CANCELLED | OUTPATIENT
Start: 2018-07-27

## 2018-07-26 RX ORDER — TRAZODONE HYDROCHLORIDE 50 MG/1
150 TABLET ORAL NIGHTLY
Status: CANCELLED | OUTPATIENT
Start: 2018-07-26

## 2018-07-26 RX ORDER — ASPIRIN 325 MG
325 TABLET ORAL DAILY
Status: DISCONTINUED | OUTPATIENT
Start: 2018-07-26 | End: 2018-07-30 | Stop reason: HOSPADM

## 2018-07-26 RX ORDER — ATORVASTATIN CALCIUM 40 MG/1
40 TABLET, FILM COATED ORAL NIGHTLY
Status: CANCELLED | OUTPATIENT
Start: 2018-07-26

## 2018-07-26 RX ADMIN — SODIUM CHLORIDE, PRESERVATIVE FREE 3 ML: 5 INJECTION INTRAVENOUS at 02:07

## 2018-07-26 RX ADMIN — INSULIN ASPART 2 UNITS: 100 INJECTION, SOLUTION INTRAVENOUS; SUBCUTANEOUS at 12:07

## 2018-07-26 RX ADMIN — BUSPIRONE HYDROCHLORIDE 5 MG: 5 TABLET ORAL at 10:07

## 2018-07-26 RX ADMIN — ENOXAPARIN SODIUM 40 MG: 100 INJECTION SUBCUTANEOUS at 05:07

## 2018-07-26 RX ADMIN — PIPERACILLIN SODIUM AND TAZOBACTAM SODIUM 4.5 G: 4; .5 INJECTION, POWDER, FOR SOLUTION INTRAVENOUS at 05:07

## 2018-07-26 RX ADMIN — ATORVASTATIN CALCIUM 40 MG: 40 TABLET, FILM COATED ORAL at 09:07

## 2018-07-26 RX ADMIN — PAROXETINE HYDROCHLORIDE HEMIHYDRATE 40 MG: 20 TABLET, FILM COATED ORAL at 05:07

## 2018-07-26 RX ADMIN — INSULIN ASPART 3 UNITS: 100 INJECTION, SOLUTION INTRAVENOUS; SUBCUTANEOUS at 05:07

## 2018-07-26 RX ADMIN — SODIUM CHLORIDE, PRESERVATIVE FREE 3 ML: 5 INJECTION INTRAVENOUS at 07:07

## 2018-07-26 RX ADMIN — TRAZODONE HYDROCHLORIDE 150 MG: 50 TABLET ORAL at 09:07

## 2018-07-26 RX ADMIN — ASPIRIN 325 MG ORAL TABLET 325 MG: 325 PILL ORAL at 10:07

## 2018-07-26 RX ADMIN — PIPERACILLIN SODIUM AND TAZOBACTAM SODIUM 4.5 G: 4; .5 INJECTION, POWDER, FOR SOLUTION INTRAVENOUS at 10:07

## 2018-07-26 RX ADMIN — SODIUM CHLORIDE, PRESERVATIVE FREE 3 ML: 5 INJECTION INTRAVENOUS at 09:07

## 2018-07-26 RX ADMIN — PIPERACILLIN SODIUM AND TAZOBACTAM SODIUM 4.5 G: 4; .5 INJECTION, POWDER, FOR SOLUTION INTRAVENOUS at 01:07

## 2018-07-26 RX ADMIN — INSULIN ASPART 2 UNITS: 100 INJECTION, SOLUTION INTRAVENOUS; SUBCUTANEOUS at 09:07

## 2018-07-26 NOTE — ASSESSMENT & PLAN NOTE
Discussed with SLP- patient swallow returning! Will continue to plan PEG for Monday, 7/30 but if swallow progresses may be able to eat PO. Continue SLP and monitor progress.

## 2018-07-26 NOTE — ASSESSMENT & PLAN NOTE
Large territory bilateral CVA including L MCA territory. Numerous risk factors including HLD, htn, previous carotid disease, and smoking.  Will continue secondary prevention with anti-platelet and statin and blood pressure control.  Patient's mental status appears to be improving and his tolerance with therapy also appears to be improving.  Will place consult for rehab for potential inpatient rehab.

## 2018-07-26 NOTE — PT/OT/SLP PROGRESS
Occupational Therapy      Patient Name:  Kel Lock   MRN:  [id number]    Patient not seen today secondary to Unavailable (Comment). Pt was at [facility] study. Will follow-up 7/27/2018.    ANDREW Albert  7/26/2018

## 2018-07-26 NOTE — PT/OT/SLP PROGRESS
Physical Therapy Treatment    Patient Name:  Kel Lock   MRN:  [id number]    Recommendations:     Discharge Recommendations:  rehabilitation facility   Discharge Equipment Recommendations:  (TBD)   Barriers to discharge: Decreased caregiver support    Assessment:     Kel Lock is a 67 y.o. male admitted with a medical diagnosis of Cerebrovascular accident (CVA) due to bilateral thrombosis of carotid arteries.  He presents with the following impairments/functional limitations:  weakness, impaired endurance, impaired self care skills, impaired sensation, impaired functional mobilty, impaired cognition, impaired balance, gait instability, decreased upper extremity function, decreased lower extremity function, decreased safety awareness, visual deficits, impaired cardiopulmonary response to activity, impaired fine motor . Pt seen at ICU- NG, nair and restraints now discontinued, extra time to awaken this pm- pt now starting to eat. Pt to benefit from continued therapies at rehab.    Rehab Prognosis:  fair; patient would benefit from acute skilled PT services to address these deficits and reach maximum level of function.      Recent Surgery: * No surgery found *      Plan:     During this hospitalization, patient to be seen daily to address the above listed problems via therapeutic activities, therapeutic exercises  · Plan of Care Expires:  08/31/18   Plan of Care Reviewed with: patient    Subjective     Communicated with nurse Haley and ST Garrett prior to session.  Patient found supine upon PT entry to room, agreeable to treatment.    Nurse Haley and  stimulating pt to stay awake and cough  Nurse Haley stated NG was pulled this am and diet started- mitten L hand discontinued, nair d/c  Pt with word finding difficulty  Pt calm and cooperative    Chief Complaint: none  Patient comments/goals: none stated  Pain/Comfort:  · Pain Rating 1: 0/10    Patients cultural, spiritual, Sabianist conflicts given the  current situation: none reported     Objective:     Patient found with: PICC line, telemetry, blood pressure cuff, oxygen     General Precautions: Standard, fall   Orthopedic Precautions:N/A   Braces: N/A     Functional Mobility:  · Bed Mobility:     · Scooting: maximal assistance  · Supine to Sit: maximal assistance  · Transfers:     · Sit to Stand:  maximal assistance and of 2 persons with hand-held assist  · Bed to Chair: maximal assistance and of 2 persons with  hand-held assist  using  Squat Pivot      AM-PAC 6 CLICK MOBILITY  Turning over in bed (including adjusting bedclothes, sheets and blankets)?: 2  Sitting down on and standing up from a chair with arms (e.g., wheelchair, bedside commode, etc.): 2  Moving from lying on back to sitting on the side of the bed?: 2  Moving to and from a bed to a chair (including a wheelchair)?: 2  Need to walk in hospital room?: 1  Climbing 3-5 steps with a railing?: 1  Basic Mobility Total Score: 10       Therapeutic Activities and Exercises:   EOB sitting with VC to use LUE for support  Once COG achieved, pt maintaining static sitting balance, forward head and trunk- attempting to correct with cueing  Bed to chair transfers in anticipating for MBSS with ST and nurse Sudha  Pillows utilized for support and positioning    Patient left up in chair with all lines intact, call button in reach and Karen ST and nurse Sudha present..    GOALS:    Physical Therapy Goals        Problem: Physical Therapy Goal    Goal Priority Disciplines Outcome Goal Variances Interventions   Physical Therapy Goal     PT/OT, PT Ongoing (interventions implemented as appropriate)     Description:  Goals to be met by: 2018     Patient will increase functional independence with mobility by performin. Supine to sit with Maximum Assistance  2. Rolling to Left and Right with Moderate Assistance.  3. Sitting at edge of bed x15 minutes with Maximum Assistance  4. Increased functional strength to  2/5 for RLE    .  Goals to be met by: 08/10/2018     Patient will increase functional independence with mobility by performin. Pt will be able to tolerate Supine.  2. Pt will be able to tolerate Sit to supine  3. Pt will be able to tolerate Sit to stand transfer   4. Pt will be able to tolerate Bed to chair transfer with most appropriate device.  5. Pt will be able to tolerate Gait  Activity using most appropriate device.     Goal edited 2018: Goals to be met 2018.    1. Pt will be able to tolerate supine<>sit mod A.  2. Pt will perform supine BLE exercises (actively/passively) x 10-20 as tolerated by patient.   3. Pt will tolerate sit<>stand, stand pivot transfers max A.                         Time Tracking:     PT Received On: 18  PT Start Time: 1312     PT Stop Time: 1326  PT Total Time (min): 14 min     Billable Minutes: Therapeutic Activity 14    Treatment Type: Treatment  PT/PTA: PT     PTA Visit Number: 0     Mariel Garza, PT  2018

## 2018-07-26 NOTE — PLAN OF CARE
Problem: SLP Goal  Goal: SLP Goal  1. Ongoing BSS - MET  2. Complete cognitive-linguistic evaluation - MET  3. Orientation x 4 SBA, 90% accuracy/  4. Automatics 100% independently.   5. Follow 3-step commands, 90% accuracy, independently.   6. Demonstrate comprehension of 3-sentence paragraph, 75% accuracy, independently.    7. NEW: Pt will participate in MBSS for further evaluation of swallow function  Outcome: Ongoing (interventions implemented as appropriate)  Pt continues with s/s pharyngeal dysphagia with thin liquids. Tolerating pureed textures and nectar thick liquids without overt s/s penetration/asrpiaiton. Continues to require verbal cues to maintain alertness. REC MBSS for further evaluation of swallow function. Discussed with MD. MBSS tentatively scheduled for 13:00, today.     Tami Leonard MS, CCC/SLP 7/26/2018

## 2018-07-26 NOTE — PLAN OF CARE
Problem: Patient Care Overview  Goal: Plan of Care Review  Outcome: Ongoing (interventions implemented as appropriate)  Care plan reviewed.  Safety maintained.  Remains on O2 at 3L/NC, noted patient has episodes of sleep apnea of 30 seconds or longer.  IV antibiotics continue for treatment of pneumonia.  Right sided hemiplegia.  NGT discontinued today.  Patient passed modified swallow study today.  Diet is pureed. Tolerating well. Calorie count starting today with dinner.  Dietician has been informed of breakfast and lunch intake.  Blood sugars checked Q AC and HS with insulin sliding scale coverage PRN.

## 2018-07-26 NOTE — PLAN OF CARE
Problem: Patient Care Overview  Goal: Plan of Care Review  Outcome: Ongoing (interventions implemented as appropriate)  POC discussed with patient/daughter, voiced understanding. Pt with uneventful night. Slept well between care. Incontinent of urine/loose stool. Ate pudding and drank 2 containers of thickened juice and water. Received antibiotics as ordered. Monitored accuchecks with coverage. VS stable, SR on Telemetry. Repositioned multiple times, pillows used for elevation. Call light at bedside. HOB elevated. Soft wrist restraint left arm, to prevent pulling of lines. NGT remains R nares, clamped.

## 2018-07-26 NOTE — PLAN OF CARE
Problem: SLP Goal  Goal: SLP Goal  1. Ongoing BSS - MET  2. Complete cognitive-linguistic evaluation - MET  3. Orientation x 4 SBA, 90% accuracy/  4. Automatics 100% independently.   5. Follow 3-step commands, 90% accuracy, independently.   6. Demonstrate comprehension of 3-sentence paragraph, 75% accuracy, independently.    7. NEW: Pt will participate in MBSS for further evaluation of swallow function--MET  8. Laryngeal elevation exercises with MIN A  9. Tolerate soft solids with functional/timely oral phase provided SPV  MBSS completed. Pt presents with mild oral dysphagia. All PO trials tolerated with no penetration, no aspiration and no significant residue. REC continued pureed textures, for now, with thin liquids. Crush meds, Assistance with all meals, ONLY feed when alert, OOB to chair or bed in chair position during all meals. Risk for aspiration if pt not adequately alert during PO. Will follow.     Tami Leonard MS, CCC/SLP 7/26/2018

## 2018-07-26 NOTE — PLAN OF CARE
Problem: Physical Therapy Goal  Goal: Physical Therapy Goal  Goals to be met by: 2018     Patient will increase functional independence with mobility by performin. Supine to sit with Maximum Assistance  2. Rolling to Left and Right with Moderate Assistance.  3. Sitting at edge of bed x15 minutes with Maximum Assistance  4. Increased functional strength to 2/5 for RLE    .  Goals to be met by: 08/10/2018     Patient will increase functional independence with mobility by performin. Pt will be able to tolerate Supine.  2. Pt will be able to tolerate Sit to supine  3. Pt will be able to tolerate Sit to stand transfer   4. Pt will be able to tolerate Bed to chair transfer with most appropriate device.  5. Pt will be able to tolerate Gait  Activity using most appropriate device.     Goal edited 2018: Goals to be met 2018.    1. Pt will be able to tolerate supine<>sit mod A.  2. Pt will perform supine BLE exercises (actively/passively) x 10-20 as tolerated by patient.   3. Pt will tolerate sit<>stand, stand pivot transfers max A.        Outcome: Ongoing (interventions implemented as appropriate)  Pt able to sustain static sitting balance seated EOB, bed to chair transfer assist x2, has difficulty stepping to pivot to chair. Pt more interactive

## 2018-07-26 NOTE — ASSESSMENT & PLAN NOTE
Patient is chronically on statin. Will continue for now. Monitor clinically. Last LDL was   Lab Results   Component Value Date    LDLCALC 63.8 07/15/2018

## 2018-07-26 NOTE — PLAN OF CARE
07/26/18 0809   Patient Assessment/Suction   Level of Consciousness (AVPU) alert   Respiratory Effort Unlabored;Normal   Expansion/Accessory Muscles/Retractions no retractions;no use of accessory muscles   All Lung Fields Breath Sounds coarse   Rhythm/Pattern, Respiratory unlabored;pattern regular;depth regular   Cough Frequency infrequent   Cough Type nonproductive   PRE-TX-O2-ETCO2   O2 Device (Oxygen Therapy) nasal cannula   $ Is the patient on Low Flow Oxygen? Yes   Flow (L/min) 3   Oxygen Concentration (%) 32   SpO2 95 %   Pulse Oximetry Type Continuous   $ Pulse Oximetry - Multiple Charge Pulse Oximetry - Multiple   Pulse 67   Resp (!) 2   Ready to Wean/Extubation Screen   FIO2<=50 (chart decimal) 0.32   Preset CPAP/BiPAP Settings   Mode Of Delivery Standby

## 2018-07-26 NOTE — PT/OT/SLP PROGRESS
"Speech Language Pathology Treatment    Patient Name:  Kel Lock   MRN:  [id number]  Admitting Diagnosis: Cerebrovascular accident (CVA) due to bilateral thrombosis of carotid arteries    Recommendations:                 General Recommendations:  Modified barium swallow study  Diet recommendations:  Puree, Liquid Diet Level: Nectar Thick   Aspiration Precautions: 1 bite/sip at a time, Assistance with meals and Assistance with thickening liquids, Check for pocketing/oral residue, Frequent oral care, HOB to 90 degrees, Meds crushed in puree, Monitor for s/s of aspiration, No straws, Remain upright 30 minutes post meal, Small bites/sips, Strict aspiration precautions and Wear oxygen during intake   General Precautions: Standard, aspiration, fall, pureed diet, nectar thick (R visual neglect)  Communication strategies:  provide increased time to answer    Subjective   "I want that." (peaches)    Pain/Comfort:  · Pain Rating 1: 0/10    Objective:   Pt seen in ICU. Nsg reports pt consumed breakfast but with some coughing/congestion noted. Upon entrance into room, pt lethargic and required repeated verbal/tactile stimulation to maintain alertness. Level of alertness improved t/o duration of session. Oriented to self and place only, although he stated he was at "[facility]." Stated year as "1492", President as "[facility]," and "I don't know" re: situation. He consumed nectar thick liquids via tsp, cup, straw and applesauce with no overt s/s penetration/aspiration. Multiple spontaneous swallows noted. Immediate, reactive cough noted with thin liquids via tsp. Cough noted, prior to swallow, with peach trial. Consistent facial grimacing and c/o odynophagia with each swallow. Nsg reports NGT removed this AM.     Has the patient been evaluated by SLP for swallowing?   Yes  Keep patient NPO? No   Current Respiratory Status: nasal cannula       Assessment:     Kel Lock is a 67 y.o. male with an SLP diagnosis of Aphasia, Dysphagia " and Cognitive-Linguistic Impairment, R neglect. Pt continues with s/s pharyngeal dysphagia with thin liquids. Tolerating pureed textures and nectar thick liquids without overt s/s penetration/aspiration. Continues to require verbal cues to maintain alertness. REC MBSS for further evaluation of swallow function. Discussed with MD. MBSS tentatively scheduled for 13:00, today. .    Goals:    SLP Goals        Problem: SLP Goal    Goal Priority Disciplines Outcome   SLP Goal     SLP Ongoing (interventions implemented as appropriate)   Description:  1. Ongoing BSS - MET  2. Complete cognitive-linguistic evaluation - MET  3. Orientation x 4 SBA, 90% accuracy/  4. Automatics 100% independently.   5. Follow 3-step commands, 90% accuracy, independently.   6. Demonstrate comprehension of 3-sentence paragraph, 75% accuracy, independently.    7. NEW: Pt will participate in MBSS for further evaluation of swallow function                    Plan:     · Patient to be seen:  5 x/week   · Plan of Care expires:     · Plan of Care reviewed with:  patient (MD Paige)   · SLP Follow-Up:  Yes       Discharge recommendations:  rehabilitation facility     Time Tracking:     SLP Treatment Date:   07/26/18  Speech Start Time:  1030  Speech Stop Time:  1049     Speech Total Time (min):  19 min    Billable Minutes: Treatment Swallowing Dysfunction 19 and Total Time 19    Tami Leonard CCC-SLP  07/26/2018

## 2018-07-26 NOTE — SUBJECTIVE & OBJECTIVE
Interval History: Patient seen and examined.  Overnight, patient had episode of hypotension which essentially was asymptomatic and transient.  No further sequela of hypotension noted. Patient remains aphasic but more reactive nonverbally.  Plan of care discussed with the patient and the nurse at the bedside in detail.  Plan of care also discussed with Gastroenterology.    Review of Systems   Unable to perform ROS: Mental status change     Objective:     Vital Signs (Most Recent):  Temp: 98.4 °F (36.9 °C) (07/25/18 2015)  Pulse: 74 (07/25/18 1800)  Resp: (!) 23 (07/25/18 1800)  BP: 121/78 (07/25/18 1800)  SpO2: 96 % (07/25/18 2030) Vital Signs (24h Range):  Temp:  [97 °F (36.1 °C)-98.4 °F (36.9 °C)] 98.4 °F (36.9 °C)  Pulse:  [] 74  Resp:  [15-41] 23  SpO2:  [92 %-100 %] 96 %  BP: ()/(45-88) 121/78     Weight: 84.6 kg (186 lb 8.2 oz)  Body mass index is 28.36 kg/m².    Intake/Output Summary (Last 24 hours) at 07/25/18 2100  Last data filed at 07/25/18 1800   Gross per 24 hour   Intake              970 ml   Output             1575 ml   Net             -605 ml      Physical Exam     General exam-lethargic,  man who is having increasing respiratory distress.  HEENT-pupils sluggish but reactive.  Extraocular movements appear intact.  Oropharynx is clear and dry. nasogastric tube present in the patient's right nostril.  Neck exam-no JVD or thyromegaly  Cardiovascular-regular rate rhythm no murmurs gallops rubs  Respiratory-bilateral sonorous breath sounds noted. CTA Bilaterally.  Abdomen-soft, nontender nondistended.  Bowel sounds are normoactive.  Extremities-no cyanosis, clubbing, edema.  Pulses are 2+ and symmetric.  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  Right emelia-neglect also noted. Patient gait was not assessed secondary to hemiplegia.    Significant Labs: All pertinent labs within the past 24 hours have been  reviewed.    Significant Imaging: I have reviewed all pertinent imaging results/findings within the past 24 hours.

## 2018-07-26 NOTE — ASSESSMENT & PLAN NOTE
patient with hypertension and episodic hypotension.  Continue transdermal clonidine for now and monitor blood pressure closely.  Metoprolol has been stopped.

## 2018-07-26 NOTE — ASSESSMENT & PLAN NOTE
- adequate glycemic control for now  - continue enteral feeds    Lab Results   Component Value Date    HGBA1C 7.2 (H) 07/15/2018     Most recent fingerstick glucose reviewed-     Recent Labs  Lab 07/25/18  1025 07/25/18  1133 07/25/18  1650 07/25/18  2038   POCTGLUCOSE 266* 259* 252* 245*     Current correctional scale  Low  Maintain anti-hyperglycemic dose as follows-   Antihyperglycemics     Start     Stop Route Frequency Ordered    07/25/18 2100  insulin detemir U-100 pen 15 Units      -- SubQ Nightly 07/25/18 0917    07/17/18 1744  insulin aspart U-100 pen 0-5 Units      -- SubQ As needed (PRN) 07/17/18 0815

## 2018-07-26 NOTE — PLAN OF CARE
07/25/18 2040   Patient Assessment/Suction   Level of Consciousness (AVPU) alert   PRE-TX-O2-ETCO2   O2 Device (Oxygen Therapy) nasal cannula   Flow (L/min) 3   Oxygen Concentration (%) 32   SpO2 98 %   Pulse Oximetry Type Continuous   Pulse 77   Resp (!) 23   Ready to Wean/Extubation Screen   FIO2<=50 (chart decimal) 0.32   Preset CPAP/BiPAP Settings   Mode Of Delivery Standby  (Pt not requiring Bipap at this time)

## 2018-07-26 NOTE — PROGRESS NOTES
[facility] Medicine  Progress Note    Patient Name: Kel Lock  MRN: [id number]  Patient Class: IP- Inpatient   Admission Date: 7/15/2018  Length of Stay: 10 days  Attending Physician: Carrington Garcia MD  Primary Care Provider: [facility]        Subjective:     Principal Problem:Cerebrovascular accident (CVA) due to bilateral thrombosis of carotid arteries    HPI:  Mr. Lock is a 67 YOCM with PMH of HTN, DM, HLD, who was last seen by his ex-wife 3 days ago, normal , and apparently was found lying on floor unable to move himself back to the bed after he had a fall, heard by his roommate. The above statement was obtained from the medical record. Family at bed sided include his daughter, son in-law, ex wife, who have not witness any change in his clinical status.   He was presented to the [facility] with right sided weakness, slurred speech and altered mental status. Telestroke was consulted who calculated his NIH value at 16. Initial CT head showed areas of moderate involutional changes consistent with microvascular ischemic changes with encephalomalacia watershed area of distribution, and subacute infarcts at right and left parieto-occipital cortex.   Pt was communicated with neurology, apparently who recommended MRI of the brain without contrast.   At the time of my interview with the patient, he denied CP, SOB or HA, problem with vision, but he clearly has left preference and right hemineglect. The pt is asking for water since he has touched the floor.       Hospital Course:  7/19: 66 y/o unfortunate  who has suffered a very large territory, bilateral CVA, including L MCA territory. Unable to perform MRI due to presence of ICD. Was not a candidate for tPA or endovascular therapy --> treating with statin, anti-htn, and DAPT. Does not appear to have made any meaningful clinical progress. Has flaccid paralysis of R side. Occasionally follows some minior  commands with fluctuating consciousness. Some resp distress requiring BiPAP. Long discussion at bedside with family about GoC.     7/20: Pt seen and examined. Moved to ICU overnight due to needs for BiPAP. More awake/alert this AM, but quite agitated. Spontaneous movement of L side, but remains with R flaccid paralysis. Hemodynamically otherwise stable at the moment.     7/21: No major changes overnight. Pt awakens to voice and responds to some commands, but remains with fluctuating consciousness. BP stable off anti-htn. Remains with R hemiparesis, dysphagia, dysarthria.     7/22: Pt seen and examined. Had some desaturation and mod resp distress earlier --> back on BiPAP currently with improved O2 sat. Did have some fever as well. Tolerating tube feeds at present. Now DNR.    Interval History: Patient seen and examined.  Overnight, patient had episode of hypotension which essentially was asymptomatic and transient.  No further sequela of hypotension noted. Patient remains aphasic but more reactive nonverbally.  Plan of care discussed with the patient and the nurse at the bedside in detail.  Plan of care also discussed with Gastroenterology.    Review of Systems   Unable to perform ROS: Mental status change     Objective:     Vital Signs (Most Recent):  Temp: 98.4 °F (36.9 °C) (07/25/18 2015)  Pulse: 74 (07/25/18 1800)  Resp: (!) 23 (07/25/18 1800)  BP: 121/78 (07/25/18 1800)  SpO2: 96 % (07/25/18 2030) Vital Signs (24h Range):  Temp:  [97 °F (36.1 °C)-98.4 °F (36.9 °C)] 98.4 °F (36.9 °C)  Pulse:  [] 74  Resp:  [15-41] 23  SpO2:  [92 %-100 %] 96 %  BP: ()/(45-88) 121/78     Weight: 84.6 kg (186 lb 8.2 oz)  Body mass index is 28.36 kg/m².    Intake/Output Summary (Last 24 hours) at 07/25/18 2100  Last data filed at 07/25/18 1800   Gross per 24 hour   Intake              970 ml   Output             1575 ml   Net             -605 ml      Physical Exam     General exam-lethargic,  man who is having  increasing respiratory distress.  HEENT-pupils sluggish but reactive.  Extraocular movements appear intact.  Oropharynx is clear and dry. nasogastric tube present in the patient's right nostril.  Neck exam-no JVD or thyromegaly  Cardiovascular-regular rate rhythm no murmurs gallops rubs  Respiratory-bilateral sonorous breath sounds noted. CTA Bilaterally.  Abdomen-soft, nontender nondistended.  Bowel sounds are normoactive.  Extremities-no cyanosis, clubbing, edema.  Pulses are 2+ and symmetric.  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  Right emelia-neglect also noted. Patient gait was not assessed secondary to hemiplegia.    Significant Labs: All pertinent labs within the past 24 hours have been reviewed.    Significant Imaging: I have reviewed all pertinent imaging results/findings within the past 24 hours.    Assessment/Plan:      * Cerebrovascular accident (CVA) due to bilateral thrombosis of carotid arteries    Large territory bilateral CVA including L MCA territory. Numerous risk factors including HLD, htn, previous carotid disease, and smoking.  Will continue secondary prevention with anti-platelet and statin and blood pressure control.  Patient's mental status appears to be improving and his tolerance with therapy also appears to be improving.  Will place consult for rehab for potential inpatient rehab.        Acute hypercapnic respiratory failure    Likely d/t aspiration and fluctuating mental status. Improving slowly. PRN BIPAP.             Dysphagia    Discussed with SLP- patient swallow returning! Will continue to plan PEG for Monday, 7/30 but if swallow progresses may be able to eat PO. Continue SLP and monitor progress.          Hypernatremia    Improving. Related to free H2O deficit from inability to swallow. Continue free H2O via NG tube and monitor.          Encephalopathy, metabolic    Multiple potential underlying etiologies,  with neurologic(CVA) and metabolic derangements being most common and potential hypercarbia as well.  Will continue to monitor mental status closely.          Malnutrition of moderate degree    NGT inserted. Continue feeds. May need PEG if unable to swallow.          HLD (hyperlipidemia)     Patient is chronically on statin. Will continue for now. Monitor clinically. Last LDL was   Lab Results   Component Value Date    LDLCALC 63.8 07/15/2018                Essential hypertension    patient with hypertension and episodic hypotension.  Continue transdermal clonidine for now and monitor blood pressure closely.  Metoprolol has been stopped.        Type 2 diabetes mellitus    - adequate glycemic control for now  - continue enteral feeds    Lab Results   Component Value Date    HGBA1C 7.2 (H) 07/15/2018     Most recent fingerstick glucose reviewed-     Recent Labs  Lab 07/25/18  1025 07/25/18  1133 07/25/18  1650 07/25/18  2038   POCTGLUCOSE 266* 259* 252* 245*     Current correctional scale  Low  Maintain anti-hyperglycemic dose as follows-   Antihyperglycemics     Start     Stop Route Frequency Ordered    07/25/18 2100  insulin detemir U-100 pen 15 Units      -- SubQ Nightly 07/25/18 0917    07/17/18 1744  insulin aspart U-100 pen 0-5 Units      -- SubQ As needed (PRN) 07/17/18 1644                  VTE Risk Mitigation         Ordered     enoxaparin injection 40 mg  Daily      07/17/18 0938     IP VTE HIGH RISK PATIENT  Once      07/15/18 1152          Critical care time spent on the evaluation and treatment of severe organ dysfunction, review of pertinent labs and imaging studies, discussions with consulting providers and discussions with patient/family: 38 minutes.    Carrington Garcia MD  Department of Hospital Medicine   [facility]

## 2018-07-26 NOTE — ASSESSMENT & PLAN NOTE
Improving. Related to free H2O deficit from inability to swallow. Continue free H2O via NG tube and monitor.

## 2018-07-26 NOTE — PROCEDURES
Modified Barium Swallow    Patient Name:  Kel Lock   MRN:  [id number]      Recommendations:     Recommendations:                General Recommendations:  Dysphagia therapy and Cognitive-linguistic therapy  Diet recommendations:  Puree, Thin   Aspiration Precautions: 1 bite/sip at a time, Assistance with meals, Check for pocketing/oral residue, Meds crushed in puree, Small bites/sips and Wear oxygen during intake , OOB to chair OR bed in chair position during all meals. ONLY feed when ALERT  General Precautions: Standard, fall, aspiration, vision impaired  Communication strategies:  provide increased time to answer    Referral     Reason for Referral  Patient was referred for a Modified Barium Swallow Study to assess the efficiency of his/her swallow function, rule out aspiration and make recommendations regarding safe dietary consistencies, effective compensatory strategies, and safe eating environment.     Diagnosis: Cerebrovascular accident (CVA) due to bilateral thrombosis of carotid arteries       History:     Past Medical History:   Diagnosis Date    COPD (chronic obstructive pulmonary disease)     Depression     Diabetes mellitus     Hypertension     Stroke        Objective:     Current Respiratory Status: 07/26/18    Alert: yes    Cooperative: yes    Follows Directions: yes    Visualization  · Patient was seen in the lateral view    Oral Peripheral Examination  · Oral Musculature: right weakness  · Dentition: scattered dentition, teeth in poor condition  · Secretion Management: adequate  · Mucosal Quality: dry  · Mandibular Strength and Mobility: WFL  · Oral Labial Strength and Mobility: impaired retraction (right side weakness)  · Lingual Strength and Mobility: WFL  · Velar Elevation:  (unable to assess d/t pt not opening mouth wide)  · Buccal Strength and Mobility: WFL  · Voice Prior to PO Intake: wet , cleared given cues; intermittently rough  · Oral Musculature Comments: See clinical swallowing  evaluation       07/26/18 1410   Speech Time Calculation   Speech Start Time 1300   Speech Stop Time 1350   Speech Total (min) 50 min   General Information   SLP Treatment Date 07/26/18   Referring Physician Jose   General Observations Alert and cooperative. Follows simple commands. R visual neglect. R emelia   Pertinent History of Current Problem CVA   Chest X-ray results Nasogastric tube looping in the hypopharynx prior to passing into the stomach.   Current Respiratory Status nasal cannula   General Precautions fall;aspiration;vision impaired   Visual/Auditory (R neglect)   Current Diet   Current Diet Textures Dysphagia Diet 1 (pureed)   Current Liquid Consistencies Nectar Thick   Oral Musculature Evaluation   Oral Musculature Comments See clinical swallowing evaluation       MBS Eval: Radiology   Views Taken (lateral)       [facility] Eval: Thin Liquid Trial   Mode of Presentation, Thin Liquid cup;spoon;straw;fed by clinician   Volume of Thin Liquid Presented via tsp, cup, straw, large sips via straw, continuous sips via straw   Oral Prep/Phase, Thin Liquid WFL   Pharyngeal Phase, Thin Liquid WFL   Rosenbeck's 8-Point Penetration-Aspiration Scale, Thin Liquids (1) Material does not enter airway.       [facility] Eval: Pureed Trial   Mode of Presentation, Puree spoon;fed by clinician   Oral Prep/Phase, Puree WFL   Pharyngeal Phase, Puree WFL   Rosenbeck's 8-Point Penetration-Aspiration Scale, Pureed (1) Material does not enter airway.       [facility] Eval: Soft Solid Trial   Mode of Presentation, Semisolid spoon;fed by clinician   Oral Prep/Phase, Semisolid prolonged chewing  (poor dentition; R weakness)   Rosenbeck's 8-Point Penetration-Aspiration Scale, Semisolid (1) Material does not enter airway.   Recommendations   NPO No   Solid Diet Level Puree   Liquid Diet Level Thin   Plan   Plan Dysphagia Therapy;Patient Education;Family Education   Plan of Care Expires on 08/02/18   SLP Follow-up   SLP Follow-up? Yes   Treatment/Billable  Minutes   Motion Fluoro Swallow, Cine/Vid 50   Total Time 50       Assessment:     Impressions  MBSS completed. Pt presents with mild oral dysphagia. All PO trials tolerated with no penetration, no aspiration and no significant residue. REC continued pureed textures, for now, with thin liquids. Crush meds, Assistance with all meals, ONLY feed when alert, **OOB to chair or bed in chair position during all meals** Risk for aspiration if pt not adequately alert during PO. Will follow with goal for dental soft textures with thin liquids.     Prognosis: Good    Barriers:  · waxing/waning mental status/level of alertness    Plan  Initiate dysphagia therapy    Education  Results were discussed with patient. and Nsg    Goals:    SLP Goals        Problem: SLP Goal    Goal Priority Disciplines Outcome   SLP Goal     SLP Ongoing (interventions implemented as appropriate)   Description:  1. Ongoing BSS - MET  2. Complete cognitive-linguistic evaluation - MET  3. Orientation x 4 SBA, 90% accuracy/  4. Automatics 100% independently.   5. Follow 3-step commands, 90% accuracy, independently.   6. Demonstrate comprehension of 3-sentence paragraph, 75% accuracy, independently.    7. NEW: Pt will participate in MBSS for further evaluation of swallow function--MET  8. Laryngeal elevation exercises with MIN A  9. Tolerate soft solids with functional/timely oral phase provided SPV                    Plan:   · Patient to be seen:  Therapy Frequency: 5 x/week   · Plan of Care expires:  08/02/18  · Plan of Care reviewed with:  patient (MD Paige)        Discharge recommendations:  rehabilitation facility       Time Tracking:   SLP Treatment Date:   07/26/18  Speech Start Time:  1300  Speech Stop Time:  1350     Speech Total Time (min):  50 min    Tami Leonard CCC-SLP  07/26/2018

## 2018-07-27 PROBLEM — E87.0 HYPERNATREMIA: Status: RESOLVED | Noted: 2018-07-19 | Resolved: 2018-07-27

## 2018-07-27 LAB
ALBUMIN SERPL BCP-MCNC: 1.9 G/DL
ALP SERPL-CCNC: 72 U/L
ALT SERPL W/O P-5'-P-CCNC: 108 U/L
ANION GAP SERPL CALC-SCNC: 12 MMOL/L
AST SERPL-CCNC: 70 U/L
BASOPHILS # BLD AUTO: 0 K/UL
BASOPHILS NFR BLD: 0.5 %
BILIRUB SERPL-MCNC: 0.3 MG/DL
BUN SERPL-MCNC: 20 MG/DL
CALCIUM SERPL-MCNC: 8.4 MG/DL
CHLORIDE SERPL-SCNC: 102 MMOL/L
CO2 SERPL-SCNC: 27 MMOL/L
CREAT SERPL-MCNC: 1.3 MG/DL
DIFFERENTIAL METHOD: ABNORMAL
EOSINOPHIL # BLD AUTO: 0.3 K/UL
EOSINOPHIL NFR BLD: 4 %
ERYTHROCYTE [DISTWIDTH] IN BLOOD BY AUTOMATED COUNT: 14.3 %
EST. GFR  (AFRICAN AMERICAN): >60 ML/MIN/1.73 M^2
EST. GFR  (NON AFRICAN AMERICAN): 56 ML/MIN/1.73 M^2
GLUCOSE SERPL-MCNC: 212 MG/DL
HCT VFR BLD AUTO: 35 %
HGB BLD-MCNC: 11.3 G/DL
LYMPHOCYTES # BLD AUTO: 1.7 K/UL
LYMPHOCYTES NFR BLD: 21.5 %
MAGNESIUM SERPL-MCNC: 2.9 MG/DL
MCH RBC QN AUTO: 29.6 PG
MCHC RBC AUTO-ENTMCNC: 32.3 G/DL
MCV RBC AUTO: 92 FL
MONOCYTES # BLD AUTO: 0.6 K/UL
MONOCYTES NFR BLD: 7.9 %
NEUTROPHILS # BLD AUTO: 5.3 K/UL
NEUTROPHILS NFR BLD: 66.1 %
PHOSPHATE SERPL-MCNC: 4.4 MG/DL
PLATELET # BLD AUTO: 247 K/UL
PMV BLD AUTO: 10.4 FL
POCT GLUCOSE: 200 MG/DL (ref 70–110)
POCT GLUCOSE: 296 MG/DL (ref 70–110)
POCT GLUCOSE: 314 MG/DL (ref 70–110)
POTASSIUM SERPL-SCNC: 4 MMOL/L
PROCALCITONIN SERPL IA-MCNC: 0.14 NG/ML
PROT SERPL-MCNC: 6.3 G/DL
RBC # BLD AUTO: 3.81 M/UL
SODIUM SERPL-SCNC: 141 MMOL/L
WBC # BLD AUTO: 8 K/UL

## 2018-07-27 PROCEDURE — 63600175 PHARM REV CODE 636 W HCPCS: Performed by: INTERNAL MEDICINE

## 2018-07-27 PROCEDURE — 97530 THERAPEUTIC ACTIVITIES: CPT

## 2018-07-27 PROCEDURE — 25000003 PHARM REV CODE 250: Performed by: INTERNAL MEDICINE

## 2018-07-27 PROCEDURE — 25000003 PHARM REV CODE 250: Performed by: HOSPITALIST

## 2018-07-27 PROCEDURE — 80053 COMPREHEN METABOLIC PANEL: CPT

## 2018-07-27 PROCEDURE — 63600175 PHARM REV CODE 636 W HCPCS: Performed by: HOSPITALIST

## 2018-07-27 PROCEDURE — 12000002 HC ACUTE/MED SURGE SEMI-PRIVATE ROOM

## 2018-07-27 PROCEDURE — 85025 COMPLETE CBC W/AUTO DIFF WBC: CPT

## 2018-07-27 PROCEDURE — 83735 ASSAY OF MAGNESIUM: CPT

## 2018-07-27 PROCEDURE — A4216 STERILE WATER/SALINE, 10 ML: HCPCS | Performed by: INTERNAL MEDICINE

## 2018-07-27 PROCEDURE — 94761 N-INVAS EAR/PLS OXIMETRY MLT: CPT

## 2018-07-27 PROCEDURE — 92526 ORAL FUNCTION THERAPY: CPT

## 2018-07-27 PROCEDURE — 84100 ASSAY OF PHOSPHORUS: CPT

## 2018-07-27 PROCEDURE — 84145 PROCALCITONIN (PCT): CPT

## 2018-07-27 PROCEDURE — 27000221 HC OXYGEN, UP TO 24 HOURS

## 2018-07-27 PROCEDURE — 97803 MED NUTRITION INDIV SUBSEQ: CPT

## 2018-07-27 RX ORDER — INSULIN ASPART 100 [IU]/ML
4 INJECTION, SOLUTION INTRAVENOUS; SUBCUTANEOUS
Status: DISCONTINUED | OUTPATIENT
Start: 2018-07-27 | End: 2018-07-30 | Stop reason: HOSPADM

## 2018-07-27 RX ORDER — PAROXETINE HYDROCHLORIDE 20 MG/1
40 TABLET, FILM COATED ORAL EVERY MORNING
Status: DISCONTINUED | OUTPATIENT
Start: 2018-07-28 | End: 2018-07-30 | Stop reason: HOSPADM

## 2018-07-27 RX ORDER — METOPROLOL SUCCINATE 25 MG/1
25 TABLET, EXTENDED RELEASE ORAL DAILY
Status: DISCONTINUED | OUTPATIENT
Start: 2018-07-27 | End: 2018-07-30 | Stop reason: HOSPADM

## 2018-07-27 RX ORDER — BUSPIRONE HYDROCHLORIDE 5 MG/1
5 TABLET ORAL DAILY
Status: DISCONTINUED | OUTPATIENT
Start: 2018-07-28 | End: 2018-07-30 | Stop reason: HOSPADM

## 2018-07-27 RX ORDER — TRAZODONE HYDROCHLORIDE 50 MG/1
50 TABLET ORAL NIGHTLY
Status: DISCONTINUED | OUTPATIENT
Start: 2018-07-27 | End: 2018-07-30 | Stop reason: HOSPADM

## 2018-07-27 RX ORDER — ACETAMINOPHEN 650 MG/20.3ML
650 LIQUID ORAL EVERY 6 HOURS PRN
Status: DISCONTINUED | OUTPATIENT
Start: 2018-07-27 | End: 2018-07-30 | Stop reason: HOSPADM

## 2018-07-27 RX ADMIN — SODIUM CHLORIDE, PRESERVATIVE FREE 3 ML: 5 INJECTION INTRAVENOUS at 01:07

## 2018-07-27 RX ADMIN — METOPROLOL SUCCINATE 25 MG: 25 TABLET, EXTENDED RELEASE ORAL at 09:07

## 2018-07-27 RX ADMIN — INSULIN ASPART 4 UNITS: 100 INJECTION, SOLUTION INTRAVENOUS; SUBCUTANEOUS at 01:07

## 2018-07-27 RX ADMIN — BUSPIRONE HYDROCHLORIDE 5 MG: 5 TABLET ORAL at 09:07

## 2018-07-27 RX ADMIN — PIPERACILLIN SODIUM AND TAZOBACTAM SODIUM 4.5 G: 4; .5 INJECTION, POWDER, FOR SOLUTION INTRAVENOUS at 09:07

## 2018-07-27 RX ADMIN — INSULIN ASPART 2 UNITS: 100 INJECTION, SOLUTION INTRAVENOUS; SUBCUTANEOUS at 05:07

## 2018-07-27 RX ADMIN — ASPIRIN 325 MG ORAL TABLET 325 MG: 325 PILL ORAL at 09:07

## 2018-07-27 RX ADMIN — TRAZODONE HYDROCHLORIDE 50 MG: 50 TABLET ORAL at 09:07

## 2018-07-27 RX ADMIN — ENOXAPARIN SODIUM 40 MG: 100 INJECTION SUBCUTANEOUS at 06:07

## 2018-07-27 RX ADMIN — PAROXETINE HYDROCHLORIDE HEMIHYDRATE 40 MG: 20 TABLET, FILM COATED ORAL at 05:07

## 2018-07-27 RX ADMIN — PIPERACILLIN SODIUM AND TAZOBACTAM SODIUM 4.5 G: 4; .5 INJECTION, POWDER, FOR SOLUTION INTRAVENOUS at 12:07

## 2018-07-27 RX ADMIN — PIPERACILLIN SODIUM AND TAZOBACTAM SODIUM 4.5 G: 4; .5 INJECTION, POWDER, FOR SOLUTION INTRAVENOUS at 06:07

## 2018-07-27 RX ADMIN — INSULIN ASPART 3 UNITS: 100 INJECTION, SOLUTION INTRAVENOUS; SUBCUTANEOUS at 06:07

## 2018-07-27 RX ADMIN — INSULIN ASPART 4 UNITS: 100 INJECTION, SOLUTION INTRAVENOUS; SUBCUTANEOUS at 06:07

## 2018-07-27 RX ADMIN — SODIUM CHLORIDE, PRESERVATIVE FREE 3 ML: 5 INJECTION INTRAVENOUS at 10:07

## 2018-07-27 NOTE — ASSESSMENT & PLAN NOTE
Large territory bilateral CVA including L MCA territory. Numerous risk factors including HLD, htn, previous carotid disease, and smoking.Currently on dual anti-platelet therapy with Plavix being held for potential procedure on Monday.  Continue statin and secondary prevention measures.  Neurology consulted.  Will defer to them for further evaluation and management.  Rehab is been consulted for potential admission to inpatient rehab.

## 2018-07-27 NOTE — PLAN OF CARE
Problem: Nutrition, Imbalanced: Inadequate Oral Intake (Adult)  Goal: Improved Oral Intake  Patient will demonstrate the desired outcomes by discharge/transition of care.  Recommendations    1) Continue Diabetic diet with texture per SLP. (Dysphagia, pureed level 4 with nectar thick beverages) + Boost Glucose Control (mix with thickener at bedside)    2) Calorie Count for 7/26/18 is 699 calories for 3 meals (220 at breakfast, 364 at lunch, 115 at supper) 36% EEN for the day;  7/27/18 breakfast only 590 calories.  32% for one meal.     Pt alert this morning. Needs assistance with feeding. Ate very well and was asking for fluids.     3) If needed: Enteral:  Diabetasource AC @  70 mls/hr.. Flush with 30 mls water Q 4 hrs or per MD. Hold if residual >250 mls.  Provides 2016 calories, 101 gms protein, 168 gms CHO, 1374 mls free water.      Goals: 1) Pt will receive nutrition within 72 hrs. 2) Pt will progress to =>85% EEN during admit.    Nutrition Goal Status: 1) Met 2) Progressing toward goal   Communication of RD Recs:  (POC, discussed in rounds)

## 2018-07-27 NOTE — ASSESSMENT & PLAN NOTE
Discussed with SLP- patient swallow returning! Will continue to plan PEG for Monday, 7/30 but if swallow progresses may be able to eat PO. Continue SLP and monitor progress. Advance diet per SLP reccs.

## 2018-07-27 NOTE — PROGRESS NOTES
[facility] Medicine  Progress Note    Patient Name: Kel Lock  MRN: [id number]  Patient Class: IP- Inpatient   Admission Date: 7/15/2018  Length of Stay: 11 days  Attending Physician: Carrington Garcia MD  Primary Care Provider: [facility]        Subjective:     Principal Problem:Cerebrovascular accident (CVA) due to bilateral thrombosis of carotid arteries    HPI:  Mr. Lock is a 67 YOCM with PMH of HTN, DM, HLD, who was last seen by his ex-wife 3 days ago, normal , and apparently was found lying on floor unable to move himself back to the bed after he had a fall, heard by his roommate. The above statement was obtained from the medical record. Family at bed sided include his daughter, son in-law, ex wife, who have not witness any change in his clinical status.   He was presented to the [place] ER with right sided weakness, slurred speech and altered mental status. Telestroke was consulted who calculated his NIH value at 16. Initial CT head showed areas of moderate involutional changes consistent with microvascular ischemic changes with encephalomalacia watershed area of distribution, and subacute infarcts at right and left parieto-occipital cortex.   Pt was communicated with neurology, apparently who recommended MRI of the brain without contrast.   At the time of my interview with the patient, he denied CP, SOB or HA, problem with vision, but he clearly has left preference and right hemineglect. The pt is asking for water since he has touched the floor.       Hospital Course:  7/19: 66 y/o unfortunate  who has suffered a very large territory, bilateral CVA, including L MCA territory. Unable to perform MRI due to presence of ICD. Was not a candidate for tPA or endovascular therapy --> treating with statin, anti-htn, and DAPT. Does not appear to have made any meaningful clinical progress. Has flaccid paralysis of R side. Occasionally follows some minior  commands with fluctuating consciousness. Some resp distress requiring BiPAP. Long discussion at bedside with family about GoC.     7/20: Pt seen and examined. Moved to ICU overnight due to needs for BiPAP. More awake/alert this AM, but quite agitated. Spontaneous movement of L side, but remains with R flaccid paralysis. Hemodynamically otherwise stable at the moment.     7/21: No major changes overnight. Pt awakens to voice and responds to some commands, but remains with fluctuating consciousness. BP stable off anti-htn. Remains with R hemiparesis, dysphagia, dysarthria.     7/22: Pt seen and examined. Had some desaturation and mod resp distress earlier --> back on BiPAP currently with improved O2 sat. Did have some fever as well. Tolerating tube feeds at present. Now DNR.    Interval History: Patient seen and examined.  Overnight, patient had episode of hypotension which essentially was asymptomatic and transient.  No further sequela of hypotension noted. Patient remains aphasic but more reactive nonverbally.  Plan of care discussed with the patient and the nurse at the bedside in detail.  Plan of care also discussed with Gastroenterology.    Review of Systems   Unable to perform ROS: Mental status change     Objective:     Vital Signs (Most Recent):  Temp: 98 °F (36.7 °C) (07/26/18 2200)  Pulse: 72 (07/26/18 2200)  Resp: (!) 29 (07/26/18 2200)  BP: 111/80 (07/26/18 2200)  SpO2: (!) 91 % (07/26/18 2200) Vital Signs (24h Range):  Temp:  [96.6 °F (35.9 °C)-98.8 °F (37.1 °C)] 98 °F (36.7 °C)  Pulse:  [64-80] 72  Resp:  [0-39] 29  SpO2:  [90 %-99 %] 91 %  BP: ()/(49-87) 111/80     Weight: 84.6 kg (186 lb 8.2 oz)  Body mass index is 28.36 kg/m².    Intake/Output Summary (Last 24 hours) at 07/26/18 4544  Last data filed at 07/26/18 1758   Gross per 24 hour   Intake              960 ml   Output                0 ml   Net              960 ml      Physical Exam     General exam-Lethargic, but arousable man in  NAD  HEENT-pupils sluggish but reactive.  Extraocular movements appear intact.  Oropharynx is clear and dry. nasogastric tube present in the patient's right nostril.  Neck exam-no JVD or thyromegaly  Cardiovascular-regular rate rhythm no murmurs gallops rubs  Respiratory-bilateral sonorous breath sounds noted. CTA Bilaterally.  Abdomen-soft, nontender nondistended.  Bowel sounds are normoactive.  Extremities-no cyanosis, clubbing, edema.  Pulses are 2+ and symmetric.  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  Right emelia-neglect also noted. Patient gait was not assessed secondary to hemiplegia.    Significant Labs: All pertinent labs within the past 24 hours have been reviewed.    Significant Imaging: I have reviewed all pertinent imaging results/findings within the past 24 hours.    Assessment/Plan:      * Cerebrovascular accident (CVA) due to bilateral thrombosis of carotid arteries    Large territory bilateral CVA including L MCA territory. Numerous risk factors including HLD, htn, previous carotid disease, and smoking.Currently on dual anti-platelet therapy with Plavix being held for potential procedure on Monday.  Continue statin and secondary prevention measures.  Neurology consulted.  Will defer to them for further evaluation and management.  Rehab is been consulted for potential admission to inpatient rehab.        Acute hypercapnic respiratory failure    Likely d/t aspiration and fluctuating mental status. Improving slowly. PRN BIPAP. Secretion management.            Dysphagia    Discussed with SLP- patient swallow returning! Will continue to plan PEG for Monday, 7/30 but if swallow progresses may be able to eat PO. Continue SLP and monitor progress. Advance diet per SLP reccs.          Hypernatremia    Improving. Related to free H2O deficit from inability to swallow. Continue free H2O via NG tube and monitor.          Encephalopathy,  metabolic    Multiple potential underlying etiologies, with neurologic(CVA) and metabolic derangements being most common and potential hypercarbia as well.  Will continue to monitor mental status closely.          Malnutrition of moderate degree    NGT inserted. Continue feeds. May need PEG if unable to swallow.          HLD (hyperlipidemia)     Patient is chronically on statin. Will continue for now. Monitor clinically. Last LDL was   Lab Results   Component Value Date    LDLCALC 63.8 07/15/2018                Essential hypertension    patient with hypertension and episodic hypotension.  Continue transdermal clonidine for now and monitor blood pressure closely.  Metoprolol has been stopped.        Type 2 diabetes mellitus    FSGs remains elevated. Hold off increasing insulin d/t decreased PO intake.    Lab Results   Component Value Date    HGBA1C 7.2 (H) 07/15/2018     Most recent fingerstick glucose reviewed-     Recent Labs  Lab 07/26/18  0529 07/26/18  1234 07/26/18  1737 07/26/18  2139   POCTGLUCOSE 152* 220* 262* 311*     Current correctional scale  Low  Maintain anti-hyperglycemic dose as follows-   Antihyperglycemics     Start     Stop Route Frequency Ordered    07/26/18 2100  insulin detemir U-100 pen 10 Units      -- SubQ Nightly 07/26/18 0928    07/17/18 1744  insulin aspart U-100 pen 0-5 Units      -- SubQ As needed (PRN) 07/17/18 1644                  VTE Risk Mitigation         Ordered     enoxaparin injection 40 mg  Daily      07/17/18 0938     IP VTE HIGH RISK PATIENT  Once      07/15/18 1152          Critical care time spent on the evaluation and treatment of severe organ dysfunction, review of pertinent labs and imaging studies, discussions with consulting providers and discussions with patient/family: 36 minutes.    Carrington Garcia MD  Department of Hospital Medicine   [facility]

## 2018-07-27 NOTE — PLAN OF CARE
Problem: Physical Therapy Goal  Goal: Physical Therapy Goal  Goals to be met by: 2018     Patient will increase functional independence with mobility by performin. Supine to sit with Maximum Assistance  2. Rolling to Left and Right with Moderate Assistance.  3. Sitting at edge of bed x15 minutes with Maximum Assistance  4. Increased functional strength to 2/5 for RLE    .  Goals to be met by: 08/10/2018     Patient will increase functional independence with mobility by performin. Pt will be able to tolerate Supine.  2. Pt will be able to tolerate Sit to supine  3. Pt will be able to tolerate Sit to stand transfer   4. Pt will be able to tolerate Bed to chair transfer with most appropriate device.  5. Pt will be able to tolerate Gait  Activity using most appropriate device.     Goal edited 2018: Goals to be met 2018.    1. Pt will be able to tolerate supine<>sit mod A.  2. Pt will perform supine BLE exercises (actively/passively) x 10-20 as tolerated by patient.   3. Pt will tolerate sit<>stand, stand pivot transfers max A.        Outcome: Ongoing (interventions implemented as appropriate)  Pt seen for OOB to chair, more talkative/interactive, stood briefly with 2 people assist with RK block

## 2018-07-27 NOTE — PT/OT/SLP PROGRESS
Physical Therapy Treatment    Patient Name:  Kel Lock   MRN:  [id number]    Recommendations:     Discharge Recommendations:  rehabilitation facility   Discharge Equipment Recommendations:  (TBD)   Barriers to discharge: Decreased caregiver support    Assessment:     Kel Lock is a 67 y.o. male admitted with a medical diagnosis of Cerebrovascular accident (CVA) due to bilateral thrombosis of carotid arteries.  He presents with the following impairments/functional limitations:  weakness, impaired endurance, impaired self care skills, visual deficits, impaired balance, gait instability, impaired functional mobilty, decreased upper extremity function, decreased lower extremity function, abnormal tone, impaired cardiopulmonary response to activity. Pt with improved responsiveness, fed self this am per nurse Haley. Pt able to stand briefly. Pt is an excellent rehab candidate.    Rehab Prognosis:  fair; patient would benefit from acute skilled PT services to address these deficits and reach maximum level of function.      Recent Surgery: * No surgery found *      Plan:     During this hospitalization, patient to be seen daily to address the above listed problems via therapeutic activities, therapeutic exercises  · Plan of Care Expires:  08/31/18   Plan of Care Reviewed with: patient    Subjective     Communicated with nurse Haley prior to session.  Patient found supine upon PT entry to room, agreeable to treatment.    Pt following directions and agreeable for OOB    Chief Complaint: none  Patient comments/goals: none stated  Pain/Comfort:  · Pain Rating 1: 0/10    Patients cultural, spiritual, [employer]ism conflicts given the current situation: none reported     Objective:     Patient found with: peripheral IV, PICC line, telemetry, oxygen, blood pressure cuff, pulse ox (continuous)     General Precautions: Standard, fall   Orthopedic Precautions:N/A   Braces: N/A     Functional Mobility:  · Bed Mobility:     · Rolling  Left:  maximal assistance  · Scooting: maximal assistance  · Supine to Sit: maximal assistance  · Transfers:     · Sit to Stand:  maximal assistance with hand-held assist  · Bed to Chair: maximal assistance and of 2 persons with  hand-held assist  using  Squat Pivot  · Balance: fair sitting   · Pt took few small hops with LLE to pivot to chair      AM-PAC 6 CLICK MOBILITY  Turning over in bed (including adjusting bedclothes, sheets and blankets)?: 2  Sitting down on and standing up from a chair with arms (e.g., wheelchair, bedside commode, etc.): 2  Moving from lying on back to sitting on the side of the bed?: 2  Moving to and from a bed to a chair (including a wheelchair)?: 2  Need to walk in hospital room?: 1  Climbing 3-5 steps with a railing?: 1  Basic Mobility Total Score: 10       Therapeutic Activities and Exercises:   stood with 2 ,people assist with RK block  OOB to chair, repositioned with pillows to maintain midline  R paresis/neglect/flaccid    Patient left up in chair with all lines intact, call button in reach, nurse Sudha notified and ST Garrett/Edie present..    GOALS:    Physical Therapy Goals        Problem: Physical Therapy Goal    Goal Priority Disciplines Outcome Goal Variances Interventions   Physical Therapy Goal     PT/OT, PT Ongoing (interventions implemented as appropriate)     Description:  Goals to be met by: 2018     Patient will increase functional independence with mobility by performin. Supine to sit with Maximum Assistance  2. Rolling to Left and Right with Moderate Assistance.  3. Sitting at edge of bed x15 minutes with Maximum Assistance  4. Increased functional strength to 2/5 for RLE    .  Goals to be met by: 08/10/2018     Patient will increase functional independence with mobility by performin. Pt will be able to tolerate Supine.  2. Pt will be able to tolerate Sit to supine  3. Pt will be able to tolerate Sit to stand transfer   4. Pt will be able to  tolerate Bed to chair transfer with most appropriate device.  5. Pt will be able to tolerate Gait  Activity using most appropriate device.     Goal edited 07/19/2018: Goals to be met 08/24/2018.    1. Pt will be able to tolerate supine<>sit mod A.  2. Pt will perform supine BLE exercises (actively/passively) x 10-20 as tolerated by patient.   3. Pt will tolerate sit<>stand, stand pivot transfers max A.                         Time Tracking:     PT Received On: 07/27/18  PT Start Time: 1140     PT Stop Time: 1153  PT Total Time (min): 13 min     Billable Minutes: Therapeutic Activity 13    Treatment Type: Treatment  PT/PTA: PT     PTA Visit Number: 0     Mariel Garza, PT  07/27/2018

## 2018-07-27 NOTE — SUBJECTIVE & OBJECTIVE
Interval History: Patient seen and examined.  Overnight, patient had episode of hypotension which essentially was asymptomatic and transient.  No further sequela of hypotension noted. Patient remains aphasic but more reactive nonverbally.  Plan of care discussed with the patient and the nurse at the bedside in detail.  Plan of care also discussed with Gastroenterology.    Review of Systems   Unable to perform ROS: Mental status change     Objective:     Vital Signs (Most Recent):  Temp: 98 °F (36.7 °C) (07/26/18 2200)  Pulse: 72 (07/26/18 2200)  Resp: (!) 29 (07/26/18 2200)  BP: 111/80 (07/26/18 2200)  SpO2: (!) 91 % (07/26/18 2200) Vital Signs (24h Range):  Temp:  [96.6 °F (35.9 °C)-98.8 °F (37.1 °C)] 98 °F (36.7 °C)  Pulse:  [64-80] 72  Resp:  [0-39] 29  SpO2:  [90 %-99 %] 91 %  BP: ()/(49-87) 111/80     Weight: 84.6 kg (186 lb 8.2 oz)  Body mass index is 28.36 kg/m².    Intake/Output Summary (Last 24 hours) at 07/26/18 2339  Last data filed at 07/26/18 1755   Gross per 24 hour   Intake              960 ml   Output                0 ml   Net              960 ml      Physical Exam     General exam-Lethargic, but arousable man in NAD  HEENT-pupils sluggish but reactive.  Extraocular movements appear intact.  Oropharynx is clear and dry. nasogastric tube present in the patient's right nostril.  Neck exam-no JVD or thyromegaly  Cardiovascular-regular rate rhythm no murmurs gallops rubs  Respiratory-bilateral sonorous breath sounds noted. CTA Bilaterally.  Abdomen-soft, nontender nondistended.  Bowel sounds are normoactive.  Extremities-no cyanosis, clubbing, edema.  Pulses are 2+ and symmetric.  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  Right emelia-neglect also noted. Patient gait was not assessed secondary to hemiplegia.    Significant Labs: All pertinent labs within the past 24 hours have been reviewed.    Significant Imaging: I have reviewed  all pertinent imaging results/findings within the past 24 hours.

## 2018-07-27 NOTE — PT/OT/SLP PROGRESS
"Speech Language Pathology Treatment    Patient Name:  Kel Lock   MRN:  [id number]  Admitting Diagnosis: Cerebrovascular accident (CVA) due to bilateral thrombosis of carotid arteries    Recommendations:                 General Recommendations:  Dysphagia therapy and Cognitive-linguistic therapy  Diet recommendations:  Puree, Liquid Diet Level: Thin   Aspiration Precautions: OOB to chair or bed in chair position for all meals, ONLY feed when ALERT, 1 bite/sip at a time, Assistance with meals, Check for pocketing/oral residue, Eliminate distractions, Meds crushed in puree, Monitor for s/s of aspiration, Small bites/sips and Wear oxygen during intake   General Precautions: Standard, aspiration, aphasia, fall, vision impaired  Communication strategies:  eye glasses and provide increased time to answer    Subjective     "More peaches."      Objective:   Pt seen in ICU. Nsg reports patient tolerating diet well and even feeding self. Nsg request plate guard. Discussed with OT; plate guard provided and informed Nsg of need for OT evaluation. Pt OOB to chair for lunch. He is alert and cooperative. He required MAX A to locate items on tray 2/2 visual deficits and significant R neglect. Pt tolerated pureed textures and nectar thick liquids without overt s/s penetration/aspiration. Peach trials revealed functional oral phase and no overt s/s penetration/aspiration. No pocketing observed. +throat clear noted x1 following thin liquids via straw, large sips. Subsequent thin trials via tsp, cup, straw reveal no overt s/s penetration/aspiration. However, he requires constant cues to small, single sips.     Has the patient been evaluated by SLP for swallowing?   Yes  Keep patient NPO? No   Current Respiratory Status: nasal cannula        Assessment:     Kel Lock is a 67 y.o. male with an SLP diagnosis of Aphasia, Dysarthria and Cognitive-Linguistic Impairment. Pt tolerating current diet as well as upgraded textures. He remains " on nectar thick liquids per MD, however, he is tolerating thin liquids; ok to advance per ST. Pt requires assistance with meals 2/2 visual impairment and significant R neglect. Continue POC. Plan to upgrade textures once pt consistently tolerating. Pt requires strict enforcement of aspiration precautions 2/2 waxing/waning mental status.     Goals:    SLP Goals        Problem: SLP Goal    Goal Priority Disciplines Outcome   SLP Goal     SLP Ongoing (interventions implemented as appropriate)   Description:  1. Ongoing BSS - MET  2. Complete cognitive-linguistic evaluation - MET  3. Orientation x 4 SBA, 90% accuracy/  4. Automatics 100% independently.   5. Follow 3-step commands, 90% accuracy, independently.   6. Demonstrate comprehension of 3-sentence paragraph, 75% accuracy, independently.    7. NEW: Pt will participate in MBSS for further evaluation of swallow function--MET  8. Laryngeal elevation exercises with MIN A  9. Tolerate soft solids with functional/timely oral phase provided SPV                    Plan:     · Patient to be seen:  5 x/week   · Plan of Care expires:  08/02/18  · Plan of Care reviewed with:  patient (MD Paige)   · SLP Follow-Up:  Yes       Discharge recommendations:  rehabilitation facility     Time Tracking:     SLP Treatment Date:   07/27/18  Speech Start Time:  1133  Speech Stop Time:  1155     Speech Total Time (min):  22 min    Billable Minutes: Eval Swallow and Oral Function 22 and Total Time 22    Tami Leonard CCC-SLP  07/27/2018

## 2018-07-27 NOTE — PT/OT/SLP PROGRESS
Occupational Therapy   Treatment    Name: Kel Lock  MRN: [id number]  Admitting Diagnosis:  Cerebrovascular accident (CVA) due to bilateral thrombosis of carotid arteries       Recommendations:     Discharge Recommendations: rehabilitation facility  Discharge Equipment Recommendations:   (TBD)  Barriers to discharge:  Decreased caregiver support    Subjective     Communicated with: Nurse prior to session.  Pain/Comfort:  · Pain Rating 1: 0/10    Patients cultural, spiritual, [employer]ist conflicts given the current situation:      Objective:     Patient found with: peripheral IV, PICC line, telemetry, oxygen, blood pressure cuff, pulse ox (continuous)    General Precautions: Standard, aphasia, aspiration, vision impaired, fall   Orthopedic Precautions:N/A   Braces: N/A     Occupational Performance:    Bed Mobility:    · Patient completed Rolling/Turning to Right with moderate assistance     Activities of Daily Living:  · Feeding:  minimum assistance to bring cup to mouth, dysmetria noted.    · OT cued pt to look to the R to find cup placed on his (R) neglected side.     Patient left HOB elevated with all lines intact, call button in reach, Nurse notified and family present    AMPA 6 Click:  [facility] Total Score: 8     Treatment & Education:  OT ed pt on bed mobility techniques to increase independence with task. OT repositioned pt to HOB in upright, midline sitting for self feeding & ed pt on safe positioning with self feeding to increase independence & reduce risk of aspiration with task. OT ed pt on B shoulder flexion SROM exercise with L UE assisting R UE & pt completing 5 reps of shoulder flexion with cues and supervision.  Education:    Assessment:     Kel Lock is a 67 y.o. male with a medical diagnosis of Cerebrovascular accident (CVA) due to bilateral thrombosis of carotid arteries.  He presents more alert today, pt was cooperative and engaged in conversation. Pt continues to show improvement, his R hand  showed trace movement. Pt continues to have severe Right neglect. Pt put forth good effort with all treatment activities.   Performance deficits affecting function are weakness, impaired endurance, impaired sensation, impaired self care skills, impaired functional mobilty, gait instability, impaired balance, visual deficits, impaired cognition, decreased coordination, decreased upper extremity function, decreased lower extremity function, decreased safety awareness, impaired fine motor, impaired cardiopulmonary response to activity, abnormal tone.  Pt will benefit from inpatient Rehab due to high prior level of functioning in order to facilitate return to prior level of function before returning home with family.    Rehab Prognosis:  Good; patient would benefit from acute skilled OT services to address these deficits and reach maximum level of function.       Plan:     Patient to be seen 3 x/week to address the above listed problems via self-care/home management, therapeutic activities, therapeutic exercises  · Plan of Care Expires: 08/23/18  · Plan of Care Reviewed with: patient, family    This Plan of care has been discussed with the patient who was involved in its development and understands and is in agreement with the identified goals and treatment plan    GOALS:    Occupational Therapy Goals        Problem: Occupational Therapy Goal    Goal Priority Disciplines Outcome Interventions   Occupational Therapy Goal     OT, PT/OT Ongoing (interventions implemented as appropriate)    Description:  Updated Goals to be met by: 8/3/18     Patient will increase functional independence with ADLs by performing:    Grooming while in supported sitting with Minimal Assistance and Assistive Devices as needed.  Sitting at edge of bed x5 minutes with Moderate Assistance and use of upper extremity support.  R hand and forearm to be elevated/supported on pillows to prevent edema and shoulder joint subluxation.      Goals to be met  by: 7/29/18     Patient will increase functional independence with ADLs by performing:    Grooming while in supported sitting with Minimal Assistance and Assistive Devices as needed.  Sitting at edge of bed x5 minutes with Moderate Assistance and use of upper extremity support.  R hand and forearm to be elevated/supported on pillows to prevent edema and shoulder joint subluxation.                       Time Tracking:     OT Date of Treatment: 07/27/18  OT Start Time: 1305  OT Stop Time: 1321  OT Total Time (min): 16 min    Billable Minutes:Therapeutic Activity 16    ABDIAZIZ Jeffries  7/27/2018   I certify that I was present in the room directing the student in service delivery and guiding them using my skilled judgment. As the co-signing therapist I have reviewed the students documentation and am responsible for the treatment, assessment, and plan.   ANDREW Whyte

## 2018-07-27 NOTE — PLAN OF CARE
Problem: Patient Care Overview  Goal: Plan of Care Review  Outcome: Ongoing (interventions implemented as appropriate)  POC discussed with patient, voiced understanding. Patient with uneventful night, slept between care. Drank per self and tolerated 2 cups of thickened juice. Able to feed self a chocolate pudding, left handed. Incontinent X 2 of urine. SB on telemetry. Monitoring accuchecks with coverage as needed. Remains with flat affect. 02/3L/NC with sats high 90's, periods of sleep apnea noted. Call light at bedside. No complaints of pain. Right sided extremities elevated on pillow. NS KVO upper R Mid line, am labs drawn from line.

## 2018-07-27 NOTE — ASSESSMENT & PLAN NOTE
Likely d/t aspiration and fluctuating mental status. Improving slowly. PRN BIPAP. Secretion management.

## 2018-07-27 NOTE — PROGRESS NOTES
[facility]  Adult Nutrition  Consult Note    SUMMARY       Recommendations    1) Continue Diabetic diet with texture per SLP. (Dysphagia, pureed level 4 with nectar thick beverages) + Boost Glucose Control (mix with thickener at bedside)    2) Calorie Count for 7/26/18 is 699 calories for 3 meals (220 at breakfast, 364 at lunch, 115 at supper) 36% EEN for the day;  7/27/18 breakfast only 590 calories.  32% for one meal.     Pt alert this morning. Needs assistance with feeding. Ate very well and was asking for fluids.     3) If needed: Enteral:  Diabetasource AC @  70 mls/hr.. Flush with 30 mls water Q 4 hrs or per MD. Hold if residual >250 mls.  Provides 2016 calories, 101 gms protein, 168 gms CHO, 1374 mls free water.      Goals: 1) Pt will receive nutrition within 72 hrs. 2) Pt will progress to =>85% EEN during admit.    Nutrition Goal Status: 1) Met 2) Progressing toward goal   Communication of RD Recs:  (POC, discussed in rounds)    Reason for Assessment    Reason for Assessment: RD follow up  1. Thrombotic stroke    2. Stroke    3. Cerebrovascular accident (CVA) due to bilateral thrombosis of carotid arteries    4. Alteration in blood pressure      Past Medical History:   Diagnosis Date    COPD (chronic obstructive pulmonary disease)     Depression     Diabetes mellitus     Hypertension     Stroke      Interdisciplinary Rounds: attended  General Information Comments: Admitted with rt sided weakness, slurred speech, AMS.  Pt not alert. Family/friend not at bedside.  Obtained information from chart and nursing.  NFPE incomplete.   7/18/18: Pt not alert. Family present. NFPE completed. No significant fat or muscle loss noted. Enteral feedings have been started, however, pt pulled the NGT out.  Bolus feeding rec provided.   7/19/18: Pt now on bipap. Enteral feedings are limited 2/2 bipap. See recs above.  Nutrition related renal labs are wnl. Energy needs adjusted accordingly.   7/23/18:  "Pt now on continuous feedings at goal. 10 mls residual.   18: SLP tracy advanced to PO intake.     Nutrition Risk Screen    Nutrition Risk Screen: other (see comments) (c/o stomach and sinus problems)    Nutrition/Diet History    Do you have any cultural, spiritual, Mormonism conflicts, given your current situation?: none reported   Food Allergies: [facility]    Anthropometrics    Temp: 97.9 °F (36.6 °C)  Height Method: Estimated  Height: 5' 8"  Height (inches): 68 in  Weight Method: Bed Scale  Weight: 84.6 kg (186 lb 8.2 oz)  Weight (lb): 186.51 lb  Ideal Body Weight (IBW), Male: 154 lb  % Ideal Body Weight, Male (lb): 117.53 lb  BMI (Calculated): 27.6  BMI Grade: 25 - 29.9 - overweight  Usual Body Weight (UBW), k.6 kg (per chart review 18)  % Usual Body Weight: 100.82  % Weight Change From Usual Weight: 0.61 %       Lab/Procedures/Meds    Pertinent Labs Reviewed: reviewed  Lab Results   Component Value Date    ALBUMIN 1.9 (L) 2018     Lab Results   Component Value Date    WBC 8.00 2018     BMP  Lab Results   Component Value Date     2018    K 4.0 2018     2018    CO2 27 2018    BUN 20 2018    CREATININE 1.3 2018    CALCIUM 8.4 (L) 2018    ANIONGAP 12 2018    ESTGFRAFRICA >60 2018    EGFRNONAA 56 (A) 2018     Lab Results   Component Value Date    CALCIUM 8.4 (L) 2018    PHOS 4.4 2018     Pertinent Medications Reviewed: reviewed  Scheduled Meds:   aspirin  325 mg Oral Daily    busPIRone  5 mg Per NG tube Daily    enoxaparin  40 mg Subcutaneous Daily    insulin aspart U-100  4 Units Subcutaneous TIDWM    insulin detemir U-100  10 Units Subcutaneous QHS    metoprolol succinate  25 mg Oral Daily    [START ON 2018] paroxetine  40 mg Oral QAM    piperacillin-tazobactam (ZOSYN) IVPB  4.5 g Intravenous Q8H    sodium chloride 0.9%  3 mL Intravenous Q8H    traZODone  50 mg Oral QHS     Continuous " Infusions:      Physical Findings/Assessment    Overall Physical Appearance:  (appears larger than BMI suggests)  Oral/Mouth Cavity: tooth/teeth missing  Skin:  (James score 13)    Estimated/Assessed Needs    Weight Used For Calorie Calculations: 82.1 kg (181 lb)  [place] [address] [place]: 1561x1.25=1953        Weight Used For Protein Calculations: 69.9 kg (154 lb) (IBW)   1.2-2.0 gm/kg/day:      Fluid Need Method: RDA Method     CHO Requirement: 45-50% EEN or <5 GIR      Nutrition Prescription Ordered    Current Diet Order: Diabetic diet with texture per SLP. (Dysphagia, pureed level 4 with nectar thick beverages) + Boost Glucose Control (mix with thickener at bedside)    Nutrition Order Comments: texture per SLP rec    Evaluation of Received Nutrient/Fluid Intake    Calorie Count for 7/26/18 is 699 calories for 3 meals (220 at breakfast, 364 at lunch, 115 at supper) 36% EEN for the day;  7/27/18 breakfast only 590 calories.  32% for one meal.     Energy Calories Required:  meeting needs  Protein Required:  meeting needs  Fluid Required:  (per primary team)    Intake/Output Summary (Last 24 hours) at 07/27/18 1041  Last data filed at 07/27/18 0500   Gross per 24 hour   Intake              920 ml   Output                0 ml   Net              920 ml   % Intake of Estimated Energy Needs: 25-50%  % Meal Intake: 25-50%  (x 1 day)    Nutrition Risk    Level of Risk/Frequency of Follow-up:  (2 x wkly)     Assessment and Plan    Inadequate energy intake r/t inability to consume sufficient energy as evidenced by NPO with no alternative nutrition  Resolved    Monitor and Evaluation    Food and Nutrient Intake: energy intake  Food and Nutrient Adminstration: diet order  Anthropometric Measurements: weight, weight change  Biochemical Data, Medical Tests and Procedures: electrolyte and renal panel, glucose/endocrine profile, inflammatory profile  Nutrition-Focused Physical Findings: overall appearance, skin     Nutrition  Follow-Up      Discharge Plan    Diabetic diet with texture per SLP with ONS for prn use  RD Follow-up?: Yes

## 2018-07-27 NOTE — PLAN OF CARE
Problem: Patient Care Overview  Goal: Plan of Care Review  Outcome: Ongoing (interventions implemented as appropriate)  Care plan reviewed.  Safety maintained.  Up in chair for approximately 2 hours today, tolerated well, but was max assist x2 back to bed.  Must sit up high when eating or drinking.   Patient needs standby/to moderate assist with eating meals, can feed self with assist on where food is. Patient is left handed.  Calorie count continues.   Remains with no movement to RUE, but is wriggling toes and attempting to lift right leg.   Accuchecks done and covered with insulin sliding scale PRN.   IV antibiotics continue for treatment of pneumonia. Patient afebrile.  PT/OT/ST has seen the patient today.   Remains on 3L/NC.   Patient to possibly have peg tube placement on Monday, Dr. Rothman wants to be notified on Sunday if family is going to proceed with procedure.    Patient to be transferred to room 219-1 on PCU.

## 2018-07-27 NOTE — PLAN OF CARE
07/27/18 0728   Patient Assessment/Suction   Level of Consciousness (AVPU) alert   Respiratory Effort Normal;Unlabored   Expansion/Accessory Muscles/Retractions no use of accessory muscles;no retractions   All Lung Fields Breath Sounds diminished;clear   PRE-TX-O2-ETCO2   O2 Device (Oxygen Therapy) nasal cannula   $ Is the patient on Low Flow Oxygen? Yes   Flow (L/min) 3   Oxygen Concentration (%) 32   SpO2 95 %   Pulse Oximetry Type Continuous   $ Pulse Oximetry - Multiple Charge Pulse Oximetry - Multiple   Pulse (!) 58   Resp 17   Ready to Wean/Extubation Screen   FIO2<=50 (chart decimal) 0.32   Preset CPAP/BiPAP Settings   Mode Of Delivery Standby

## 2018-07-27 NOTE — PLAN OF CARE
Received inpatient rehab referral.  Faxed pt's referral information to [facility] and [facility].     3:40 p.m. - received a call from Frieda at [facility], [phone] who stated she would come by to evaluate pt today.     4:00 p.m. - updated Frieda that pt was moved to room 219 A.     07/27/18 1526   Discharge Reassessment   Assessment Type Discharge Planning Reassessment   Discharge Plan A Rehab

## 2018-07-27 NOTE — ASSESSMENT & PLAN NOTE
FSGs remains elevated. Hold off increasing insulin d/t decreased PO intake.    Lab Results   Component Value Date    HGBA1C 7.2 (H) 07/15/2018     Most recent fingerstick glucose reviewed-     Recent Labs  Lab 07/26/18  0529 07/26/18  1234 07/26/18  1737 07/26/18  2139   POCTGLUCOSE 152* 220* 262* 311*     Current correctional scale  Low  Maintain anti-hyperglycemic dose as follows-   Antihyperglycemics     Start     Stop Route Frequency Ordered    07/26/18 2100  insulin detemir U-100 pen 10 Units      -- SubQ Nightly 07/26/18 0928    07/17/18 1744  insulin aspart U-100 pen 0-5 Units      -- SubQ As needed (PRN) 07/17/18 1644

## 2018-07-27 NOTE — PLAN OF CARE
Patient passed MBSS with some restrictions and is eating per nursing. Would see how he does over the weekend, please call GI Sunday if he needs PEG, otherwise will sign off.     Radha Rothman MD

## 2018-07-27 NOTE — PLAN OF CARE
Problem: SLP Goal  Goal: SLP Goal  1. Ongoing BSS - MET  2. Complete cognitive-linguistic evaluation - MET  3. Orientation x 4 SBA, 90% accuracy/  4. Automatics 100% independently.   5. Follow 3-step commands, 90% accuracy, independently.   6. Demonstrate comprehension of 3-sentence paragraph, 75% accuracy, independently.    7. NEW: Pt will participate in MBSS for further evaluation of swallow function--MET  8. Laryngeal elevation exercises with MIN A  9. Tolerate soft solids with functional/timely oral phase provided SPV     Pt seen up in chair with lunch. Tolerated well but with fair-poor appetite. Ok to upgrade to thin liquids. Requires assistance with all meals 2/2 visual deficits. See note for full details. REC OT consult. Continue POC.     Tami Leonard MS, CCC/SLP 7/27/2018

## 2018-07-27 NOTE — PLAN OF CARE
Problem: Occupational Therapy Goal  Goal: Occupational Therapy Goal  Updated Goals to be met by: 8/3/18     Patient will increase functional independence with ADLs by performing:    Grooming while in supported sitting with Minimal Assistance and Assistive Devices as needed.  Sitting at edge of bed x5 minutes with Moderate Assistance and use of upper extremity support.  R hand and forearm to be elevated/supported on pillows to prevent edema and shoulder joint subluxation.      Goals to be met by: 7/29/18     Patient will increase functional independence with ADLs by performing:    Grooming while in supported sitting with Minimal Assistance and Assistive Devices as needed.  Sitting at edge of bed x5 minutes with Moderate Assistance and use of upper extremity support.  R hand and forearm to be elevated/supported on pillows to prevent edema and shoulder joint subluxation.      Outcome: Ongoing (interventions implemented as appropriate)  Pt progressing towards goals. Continue OT POC.     RAYMUNDO Jeffries  7/27/2018  I certify that I was present in the room directing the student in service delivery and guiding them using my skilled judgment. As the co-signing therapist I have reviewed the students documentation and am responsible for the treatment, assessment, and plan.   ANDREW Whyte

## 2018-07-28 LAB
ALBUMIN SERPL BCP-MCNC: 2.1 G/DL
ALP SERPL-CCNC: 79 U/L
ALT SERPL W/O P-5'-P-CCNC: 115 U/L
ANION GAP SERPL CALC-SCNC: 9 MMOL/L
AST SERPL-CCNC: 55 U/L
BACTERIA BLD CULT: NORMAL
BACTERIA BLD CULT: NORMAL
BASOPHILS # BLD AUTO: 0.1 K/UL
BASOPHILS NFR BLD: 1.4 %
BILIRUB SERPL-MCNC: 0.5 MG/DL
BUN SERPL-MCNC: 16 MG/DL
CALCIUM SERPL-MCNC: 8.8 MG/DL
CHLORIDE SERPL-SCNC: 104 MMOL/L
CO2 SERPL-SCNC: 26 MMOL/L
CREAT SERPL-MCNC: 1 MG/DL
DIFFERENTIAL METHOD: ABNORMAL
EOSINOPHIL # BLD AUTO: 0.3 K/UL
EOSINOPHIL NFR BLD: 3.6 %
ERYTHROCYTE [DISTWIDTH] IN BLOOD BY AUTOMATED COUNT: 13.9 %
EST. GFR  (AFRICAN AMERICAN): >60 ML/MIN/1.73 M^2
EST. GFR  (NON AFRICAN AMERICAN): >60 ML/MIN/1.73 M^2
GLUCOSE SERPL-MCNC: 98 MG/DL
HCT VFR BLD AUTO: 37.6 %
HGB BLD-MCNC: 12.2 G/DL
LYMPHOCYTES # BLD AUTO: 1.9 K/UL
LYMPHOCYTES NFR BLD: 20.4 %
MAGNESIUM SERPL-MCNC: 2.1 MG/DL
MCH RBC QN AUTO: 29.8 PG
MCHC RBC AUTO-ENTMCNC: 32.4 G/DL
MCV RBC AUTO: 92 FL
MONOCYTES # BLD AUTO: 0.5 K/UL
MONOCYTES NFR BLD: 5.5 %
NEUTROPHILS # BLD AUTO: 6.4 K/UL
NEUTROPHILS NFR BLD: 69.1 %
PHOSPHATE SERPL-MCNC: 3.7 MG/DL
PLATELET # BLD AUTO: 264 K/UL
PMV BLD AUTO: 9.4 FL
POCT GLUCOSE: 151 MG/DL (ref 70–110)
POCT GLUCOSE: 213 MG/DL (ref 70–110)
POCT GLUCOSE: 240 MG/DL (ref 70–110)
POTASSIUM SERPL-SCNC: 3.9 MMOL/L
PROT SERPL-MCNC: 6.3 G/DL
RBC # BLD AUTO: 4.09 M/UL
SODIUM SERPL-SCNC: 139 MMOL/L
WBC # BLD AUTO: 9.3 K/UL

## 2018-07-28 PROCEDURE — 92526 ORAL FUNCTION THERAPY: CPT

## 2018-07-28 PROCEDURE — 63600175 PHARM REV CODE 636 W HCPCS: Performed by: HOSPITALIST

## 2018-07-28 PROCEDURE — 36415 COLL VENOUS BLD VENIPUNCTURE: CPT

## 2018-07-28 PROCEDURE — G8997 SWALLOW GOAL STATUS: HCPCS | Mod: CI

## 2018-07-28 PROCEDURE — 80053 COMPREHEN METABOLIC PANEL: CPT

## 2018-07-28 PROCEDURE — 63600175 PHARM REV CODE 636 W HCPCS: Performed by: INTERNAL MEDICINE

## 2018-07-28 PROCEDURE — 84100 ASSAY OF PHOSPHORUS: CPT

## 2018-07-28 PROCEDURE — A4216 STERILE WATER/SALINE, 10 ML: HCPCS | Performed by: INTERNAL MEDICINE

## 2018-07-28 PROCEDURE — 99900035 HC TECH TIME PER 15 MIN (STAT)

## 2018-07-28 PROCEDURE — 92507 TX SP LANG VOICE COMM INDIV: CPT

## 2018-07-28 PROCEDURE — 27000221 HC OXYGEN, UP TO 24 HOURS

## 2018-07-28 PROCEDURE — 83735 ASSAY OF MAGNESIUM: CPT

## 2018-07-28 PROCEDURE — 25000003 PHARM REV CODE 250: Performed by: HOSPITALIST

## 2018-07-28 PROCEDURE — 97110 THERAPEUTIC EXERCISES: CPT

## 2018-07-28 PROCEDURE — 85025 COMPLETE CBC W/AUTO DIFF WBC: CPT

## 2018-07-28 PROCEDURE — 94640 AIRWAY INHALATION TREATMENT: CPT

## 2018-07-28 PROCEDURE — G8996 SWALLOW CURRENT STATUS: HCPCS | Mod: CJ

## 2018-07-28 PROCEDURE — 25000003 PHARM REV CODE 250: Performed by: INTERNAL MEDICINE

## 2018-07-28 PROCEDURE — 12000002 HC ACUTE/MED SURGE SEMI-PRIVATE ROOM

## 2018-07-28 PROCEDURE — 94761 N-INVAS EAR/PLS OXIMETRY MLT: CPT

## 2018-07-28 RX ADMIN — PIPERACILLIN SODIUM AND TAZOBACTAM SODIUM 4.5 G: 4; .5 INJECTION, POWDER, FOR SOLUTION INTRAVENOUS at 08:07

## 2018-07-28 RX ADMIN — TRAZODONE HYDROCHLORIDE 50 MG: 50 TABLET ORAL at 09:07

## 2018-07-28 RX ADMIN — INSULIN ASPART 4 UNITS: 100 INJECTION, SOLUTION INTRAVENOUS; SUBCUTANEOUS at 08:07

## 2018-07-28 RX ADMIN — ENOXAPARIN SODIUM 40 MG: 100 INJECTION SUBCUTANEOUS at 05:07

## 2018-07-28 RX ADMIN — BUSPIRONE HYDROCHLORIDE 5 MG: 5 TABLET ORAL at 08:07

## 2018-07-28 RX ADMIN — ASPIRIN 325 MG ORAL TABLET 325 MG: 325 PILL ORAL at 08:07

## 2018-07-28 RX ADMIN — INSULIN ASPART 4 UNITS: 100 INJECTION, SOLUTION INTRAVENOUS; SUBCUTANEOUS at 11:07

## 2018-07-28 RX ADMIN — PAROXETINE HYDROCHLORIDE 40 MG: 20 TABLET, FILM COATED ORAL at 08:07

## 2018-07-28 RX ADMIN — SODIUM CHLORIDE, PRESERVATIVE FREE 3 ML: 5 INJECTION INTRAVENOUS at 08:07

## 2018-07-28 RX ADMIN — PIPERACILLIN SODIUM AND TAZOBACTAM SODIUM 4.5 G: 4; .5 INJECTION, POWDER, FOR SOLUTION INTRAVENOUS at 01:07

## 2018-07-28 RX ADMIN — INSULIN ASPART 2 UNITS: 100 INJECTION, SOLUTION INTRAVENOUS; SUBCUTANEOUS at 11:07

## 2018-07-28 RX ADMIN — INSULIN ASPART 4 UNITS: 100 INJECTION, SOLUTION INTRAVENOUS; SUBCUTANEOUS at 05:07

## 2018-07-28 RX ADMIN — INSULIN ASPART 1 UNITS: 100 INJECTION, SOLUTION INTRAVENOUS; SUBCUTANEOUS at 09:07

## 2018-07-28 RX ADMIN — PIPERACILLIN SODIUM AND TAZOBACTAM SODIUM 4.5 G: 4; .5 INJECTION, POWDER, FOR SOLUTION INTRAVENOUS at 05:07

## 2018-07-28 RX ADMIN — METOPROLOL SUCCINATE 25 MG: 25 TABLET, EXTENDED RELEASE ORAL at 08:07

## 2018-07-28 RX ADMIN — SODIUM CHLORIDE, PRESERVATIVE FREE 3 ML: 5 INJECTION INTRAVENOUS at 01:07

## 2018-07-28 NOTE — ASSESSMENT & PLAN NOTE
Uncontrolled, likely related to excessive intake of sugar snacks.  Will start patient on 4 units of NovoLog pre meal for basal/bolus and monitor sliding scale closely.  Continue long-acting insulin at same dose.    Lab Results   Component Value Date    HGBA1C 7.2 (H) 07/15/2018     Most recent fingerstick glucose reviewed-     Recent Labs  Lab 07/27/18  1353 07/27/18  1834 07/27/18  2056   POCTGLUCOSE 314* 296* 200*     Current correctional scale  Low  Maintain anti-hyperglycemic dose as follows-   Antihyperglycemics     Start     Stop Route Frequency Ordered    07/27/18 1130  insulin aspart U-100 pen 4 Units      -- SubQ 3 times daily with meals 07/27/18 0932    07/26/18 2100  insulin detemir U-100 pen 10 Units      -- SubQ Nightly 07/26/18 0928    07/17/18 1744  insulin aspart U-100 pen 0-5 Units      -- SubQ As needed (PRN) 07/17/18 2667

## 2018-07-28 NOTE — ASSESSMENT & PLAN NOTE
Nutrition consulted. Body mass index is 28.36 kg/m².. Encourage maximal PO intake. Diet supplementation ordered per nutrition approval. Will encourage PO and monitor closely for weight changes. Calorie count in progress.

## 2018-07-28 NOTE — ASSESSMENT & PLAN NOTE
Patient with devastating CVA bilateral internal carotid stenosis. Hold statin secondary to elevated Trans-aminases and hold plavix for potential procedure. Continue ASA.  Patient is a good candidate for inpatient rehab given his rapid improvement would likely benefit from intensive inpatient rehab.  Currently awaiting rehabilitation evaluation by physiatry. Neurology consulted.

## 2018-07-28 NOTE — ASSESSMENT & PLAN NOTE
Improving remarkably.  Continue speech language pathology treatment for swallowing and cognitive dysfunction as well as speech dysfunction.  Monitor patient swallow and advance diet as directed by SLP. Will continue to plan PEG for Monday, 7/30 but if swallow progresses may be able to eat PO. Continue SLP and monitor progress. Advance diet per SLP reccs.

## 2018-07-28 NOTE — PLAN OF CARE
"Problem: SLP Goal  Goal: SLP Goal  1. Ongoing BSS - MET  2. Complete cognitive-linguistic evaluation - MET  3. Orientation x 4 SBA, 90% accuracy/  4. Automatics 100% independently.   5. Follow 3-step commands, 90% accuracy, independently.   6. Demonstrate comprehension of 3-sentence paragraph, 75% accuracy, independently.    7. NEW: Pt will participate in MBSS for further evaluation of swallow function--MET  8. Laryngeal elevation exercises with MIN A  9. Tolerate soft solids with functional/timely oral phase provided SPV   Outcome: Ongoing (interventions implemented as appropriate)  3. Pt indep oriented to name only; repeated , HATTIE, date but without retention after 30 secs despite repeated retrials & visual cues.  4. Stated Tu-Fri indep, completed all HATTIE with unison speech, finally stated correctly given "leyva" cue.  Counted 1-30 & stated RICHMOND in imitation one word at a time  5. Followed 2 step body part commands 75% acc.  Named body parts 80% acc indep; 100% acc for the rest given phonemic cue  9. Consumed 1/2 sliced, processed meat sandwich given close supervision for bite size, & extra time for mastication due to poor dentition but no s/s pharyngeal dysphagia.  Givenverbal cue to only take a single swalloW via straw, consumed thin liquid with no s/s pharyngeal dyspahgia.           "

## 2018-07-28 NOTE — PROGRESS NOTES
[facility] Hospital Medicine  Progress Note    Patient Name: Kel Lock  MRN: [id number]  Patient Class: IP- Inpatient   Admission Date: 7/15/2018  Length of Stay: 12 days  Attending Physician: Carrington Garcia MD  Primary Care Provider: [facility]        Subjective:     Principal Problem:Cerebrovascular accident (CVA) due to bilateral thrombosis of carotid arteries    HPI:  Mr. Lock is a 67 YOCM with PMH of HTN, DM, HLD, who was last seen by his ex-wife 3 days ago, normal , and apparently was found lying on floor unable to move himself back to the bed after he had a fall, heard by his roommate. The above statement was obtained from the medical record. Family at bed sided include his daughter, son in-law, ex wife, who have not witness any change in his clinical status.   He was presented to the [place] ER with right sided weakness, slurred speech and altered mental status. Telestroke was consulted who calculated his NIH value at 16. Initial CT head showed areas of moderate involutional changes consistent with microvascular ischemic changes with encephalomalacia watershed area of distribution, and subacute infarcts at right and left parieto-occipital cortex.   Pt was communicated with neurology, apparently who recommended MRI of the brain without contrast.   At the time of my interview with the patient, he denied CP, SOB or HA, problem with vision, but he clearly has left preference and right hemineglect. The pt is asking for water since he has touched the floor.       Hospital Course:  No notes on file    Interval History: Patient continues to show improvement in his mental status and lethargy.  O2 saturations remained somewhat on the low side on oxygen but patient's lungs exam is much better than before.  Able to carry a conversation and he was eating at least 50% of his meals previously.  Plan of care reviewed with the nurse and the patient at the bedside in detail.   Also, plan of care reviewed with the patient's daughter.  Currently awaiting rehab evaluation today    Review of Systems   Unable to perform ROS: Mental status change     Objective:     Vital Signs (Most Recent):  Temp: 97.4 °F (36.3 °C) (07/27/18 2024)  Pulse: 61 (07/27/18 2024)  Resp: 18 (07/27/18 2024)  BP: 123/82 (07/27/18 2024)  SpO2: (!) 93 % (07/27/18 2024) Vital Signs (24h Range):  Temp:  [96.9 °F (36.1 °C)-98.8 °F (37.1 °C)] 97.4 °F (36.3 °C)  Pulse:  [56-77] 61  Resp:  [17-29] 18  SpO2:  [89 %-97 %] 93 %  BP: ()/(50-82) 123/82     Weight: 84.6 kg (186 lb 8.2 oz)  Body mass index is 28.36 kg/m².    Intake/Output Summary (Last 24 hours) at 07/27/18 2038  Last data filed at 07/27/18 1832   Gross per 24 hour   Intake             1380 ml   Output                0 ml   Net             1380 ml      Physical Exam     General exam-Lethargic, but arousable man in NAD  HEENT-pupils sluggish but reactive.  Extraocular movements appear intact.  Oropharynx is clear and dry. Lesion on bridge of nose.  Neck exam-no JVD or thyromegaly  Cardiovascular-regular rate rhythm no murmurs gallops rubs  Respiratory-bilateral sonorous breath sounds noted. CTA Bilaterally.  Abdomen-soft, nontender nondistended.  Bowel sounds are normoactive.  Extremities-no cyanosis, clubbing, edema.  Pulses are 2+ and symmetric.  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  Right emelia-neglect also noted. Patient gait was not assessed secondary to hemiplegia.    Significant Labs: All pertinent labs within the past 24 hours have been reviewed.    Significant Imaging: I have reviewed all pertinent imaging results/findings within the past 24 hours.    Assessment/Plan:      * Cerebrovascular accident (CVA) due to bilateral thrombosis of carotid arteries    Patient with devastating CVA bilateral internal carotid stenosis. Hold statin secondary to elevated Trans-aminases and hold  plavix for potential procedure. Continue ASA.  Patient is a good candidate for inpatient rehab given his rapid improvement would likely benefit from intensive inpatient rehab.  Currently awaiting rehabilitation evaluation by physiatry. Neurology consulted.        Acute hypercapnic respiratory failure    Likely d/t aspiration and fluctuating mental status. Improving slowly. PRN BIPAP. Secretion management.            Dysphagia    Improving remarkably.  Continue speech language pathology treatment for swallowing and cognitive dysfunction as well as speech dysfunction.  Monitor patient swallow and advance diet as directed by SLP. Will continue to plan PEG for Monday, 7/30 but if swallow progresses may be able to eat PO. Continue SLP and monitor progress. Advance diet per SLP reccs.          Encephalopathy, metabolic    Multiple potential underlying etiologies, with neurologic(CVA) and metabolic derangements being most common and potential hypercarbia as well.  Will continue to monitor mental status closely.          Malnutrition of moderate degree    Nutrition consulted. Body mass index is 28.36 kg/m².. Encourage maximal PO intake. Diet supplementation ordered per nutrition approval. Will encourage PO and monitor closely for weight changes. Calorie count in progress.          HLD (hyperlipidemia)     Patient is chronically on statin. Will continue for now. Monitor clinically. Last LDL was   Lab Results   Component Value Date    LDLCALC 63.8 07/15/2018                Essential hypertension    Will stop clonidine secondary to lethargy and switch to metoprolol low-dose with a very close eye on the patient's hypotension which is likely related to autonomic dysregulation.        Type 2 diabetes mellitus    Uncontrolled, likely related to excessive intake of sugar snacks.  Will start patient on 4 units of NovoLog pre meal for basal/bolus and monitor sliding scale closely.  Continue long-acting insulin at same dose.    Lab  Results   Component Value Date    HGBA1C 7.2 (H) 07/15/2018     Most recent fingerstick glucose reviewed-     Recent Labs  Lab 07/26/18  2139 07/27/18  1353 07/27/18  1834   POCTGLUCOSE 311* 314* 296*     Current correctional scale  Low  Maintain anti-hyperglycemic dose as follows-   Antihyperglycemics     Start     Stop Route Frequency Ordered    07/27/18 1130  insulin aspart U-100 pen 4 Units      -- SubQ 3 times daily with meals 07/27/18 0932    07/26/18 2100  insulin detemir U-100 pen 10 Units      -- SubQ Nightly 07/26/18 0928    07/17/18 1744  insulin aspart U-100 pen 0-5 Units      -- SubQ As needed (PRN) 07/17/18 1644                  VTE Risk Mitigation         Ordered     enoxaparin injection 40 mg  Daily      07/17/18 0938     IP VTE HIGH RISK PATIENT  Once      07/15/18 1152              Carrington Garcia MD  Department of Hospital Medicine   [facility]

## 2018-07-28 NOTE — PROGRESS NOTES
[facility] Hospital Medicine  Progress Note    Patient Name: eKl Lock  MRN: [id number]  Patient Class: IP- Inpatient   Admission Date: 7/15/2018  Length of Stay: 13 days  Attending Physician: Elmo Mata,*  Primary Care Provider: [facility]        Subjective:     Principal Problem:Cerebrovascular accident (CVA) due to bilateral thrombosis of carotid arteries    HPI:  Mr. Lock is a 67 YOCM with PMH of HTN, DM, HLD, who was last seen by his ex-wife 3 days ago, normal , and apparently was found lying on floor unable to move himself back to the bed after he had a fall, heard by his roommate. The above statement was obtained from the medical record. Family at bed sided include his daughter, son in-law, ex wife, who have not witness any change in his clinical status.   He was presented to the [place] ER with right sided weakness, slurred speech and altered mental status. Telestroke was consulted who calculated his NIH value at 16. Initial CT head showed areas of moderate involutional changes consistent with microvascular ischemic changes with encephalomalacia watershed area of distribution, and subacute infarcts at right and left parieto-occipital cortex.   Pt was communicated with neurology, apparently who recommended MRI of the brain without contrast.   At the time of my interview with the patient, he denied CP, SOB or HA, problem with vision, but he clearly has left preference and right hemineglect. The pt is asking for water since he has touched the floor.       Hospital Course:  No notes on file    Interval History: No acute events over night. Patient continues to show improvement in his mental status and lethargy.  Apparently took 100% of breakfast this AM.  Plan of care reviewed with the nurse and the patient at the bedside in detail.  Also, plan of care reviewed with the patient's daughter.     Review of Systems   Unable to perform ROS: Mental status  change     Objective:     Vital Signs (Most Recent):  Temp: 96.2 °F (35.7 °C) (07/28/18 1138)  Pulse: 61 (07/28/18 1138)  Resp: 18 (07/28/18 1138)  BP: 118/63 (07/28/18 1138)  SpO2: (!) 94 % (07/28/18 1138) Vital Signs (24h Range):  Temp:  [96.2 °F (35.7 °C)-98.3 °F (36.8 °C)] 96.2 °F (35.7 °C)  Pulse:  [53-63] 61  Resp:  [16-18] 18  SpO2:  [93 %-97 %] 94 %  BP: (109-123)/(55-82) 118/63     Weight: 84.6 kg (186 lb 8.2 oz)  Body mass index is 28.36 kg/m².    Intake/Output Summary (Last 24 hours) at 07/28/18 1340  Last data filed at 07/28/18 0600   Gross per 24 hour   Intake              440 ml   Output                0 ml   Net              440 ml      Physical Exam   Constitutional: He appears well-developed and well-nourished. No distress.   Eyes: EOM are normal.   Pupils sluggish but reactive.   Neck: Normal range of motion.   Cardiovascular: Normal rate, regular rhythm and normal heart sounds.    No murmur heard.  Pulmonary/Chest: Effort normal and breath sounds normal.   Abdominal: Soft. Bowel sounds are normal. He exhibits no distension. There is no tenderness.   Musculoskeletal:   + R hemiplegia   + R emelia-neglect   Neurological: He is alert.   Nursing note and vitals reviewed.      Significant Labs: All pertinent labs within the past 24 hours have been reviewed.    Significant Imaging: I have reviewed all pertinent imaging results/findings within the past 24 hours.    Assessment/Plan:      * Cerebrovascular accident (CVA) due to bilateral thrombosis of carotid arteries    Patient with devastating CVA bilateral internal carotid stenosis. Hold statin secondary to elevated Trans-aminases and hold plavix for potential procedure. Continue ASA.  Patient is a good candidate for inpatient rehab given his rapid improvement would likely benefit from intensive inpatient rehab.  Currently awaiting rehabilitation evaluation by physiatry. Neurology consulted.        Dysphagia    Improving remarkably.  Continue speech  language pathology treatment for swallowing and cognitive dysfunction as well as speech dysfunction.  Monitor patient swallow and advance diet as directed by SLP. Will continue to plan PEG for Monday, 7/30 but if swallow progresses may be able to eat PO. Continue SLP and monitor progress. Advance diet per SLP reccs.          Encephalopathy, metabolic    Multiple potential underlying etiologies, with neurologic(CVA) and metabolic derangements being most common and potential hypercarbia as well.  Will continue to monitor mental status closely.          Acute hypercapnic respiratory failure    Likely d/t aspiration and fluctuating mental status. Improving slowly. PRN BIPAP. Secretion management.            Malnutrition of moderate degree    Nutrition consulted. Body mass index is 28.36 kg/m².. Encourage maximal PO intake. Diet supplementation ordered per nutrition approval. Will encourage PO and monitor closely for weight changes. Calorie count in progress.          HLD (hyperlipidemia)     Patient is chronically on statin. Will continue for now. Monitor clinically. Last LDL was   Lab Results   Component Value Date    LDLCALC 63.8 07/15/2018                Essential hypertension    Will stop clonidine secondary to lethargy and switch to metoprolol low-dose with a very close eye on the patient's hypotension which is likely related to autonomic dysregulation.        Type 2 diabetes mellitus    Uncontrolled, likely related to excessive intake of sugar snacks.  Will start patient on 4 units of NovoLog pre meal for basal/bolus and monitor sliding scale closely.  Continue long-acting insulin at same dose.    Lab Results   Component Value Date    HGBA1C 7.2 (H) 07/15/2018     Most recent fingerstick glucose reviewed-     Recent Labs  Lab 07/27/18  1353 07/27/18  1834 07/27/18  2056   POCTGLUCOSE 314* 296* 200*     Current correctional scale  Low  Maintain anti-hyperglycemic dose as follows-   Antihyperglycemics     Start      Stop Route Frequency Ordered    07/27/18 1130  insulin aspart U-100 pen 4 Units      -- SubQ 3 times daily with meals 07/27/18 0932    07/26/18 2100  insulin detemir U-100 pen 10 Units      -- SubQ Nightly 07/26/18 0928    07/17/18 1744  insulin aspart U-100 pen 0-5 Units      -- SubQ As needed (PRN) 07/17/18 1644                  VTE Risk Mitigation         Ordered     enoxaparin injection 40 mg  Daily      07/17/18 0938     IP VTE HIGH RISK PATIENT  Once      07/15/18 1152              Elmo Mata MD  Department of Hospital Medicine   [facility]

## 2018-07-28 NOTE — PLAN OF CARE
Problem: Patient Care Overview  Goal: Plan of Care Review  Outcome: Ongoing (interventions implemented as appropriate)  Pt restless with no signs of distress. Pt free from falls. Will continue to monitor.

## 2018-07-28 NOTE — SUBJECTIVE & OBJECTIVE
Interval History: Patient continues to show improvement in his mental status and lethargy.  O2 saturations remained somewhat on the low side on oxygen but patient's lungs exam is much better than before.  Able to carry a conversation and he was eating at least 50% of his meals previously.  Plan of care reviewed with the nurse and the patient at the bedside in detail.  Also, plan of care reviewed with the patient's daughter.  Currently awaiting rehab evaluation today    Review of Systems   Unable to perform ROS: Mental status change     Objective:     Vital Signs (Most Recent):  Temp: 97.4 °F (36.3 °C) (07/27/18 2024)  Pulse: 61 (07/27/18 2024)  Resp: 18 (07/27/18 2024)  BP: 123/82 (07/27/18 2024)  SpO2: (!) 93 % (07/27/18 2024) Vital Signs (24h Range):  Temp:  [96.9 °F (36.1 °C)-98.8 °F (37.1 °C)] 97.4 °F (36.3 °C)  Pulse:  [56-77] 61  Resp:  [17-29] 18  SpO2:  [89 %-97 %] 93 %  BP: ()/(50-82) 123/82     Weight: 84.6 kg (186 lb 8.2 oz)  Body mass index is 28.36 kg/m².    Intake/Output Summary (Last 24 hours) at 07/27/18 2038  Last data filed at 07/27/18 1832   Gross per 24 hour   Intake             1380 ml   Output                0 ml   Net             1380 ml      Physical Exam     General exam-Lethargic, but arousable man in NAD  HEENT-pupils sluggish but reactive.  Extraocular movements appear intact.  Oropharynx is clear and dry. Lesion on bridge of nose.  Neck exam-no JVD or thyromegaly  Cardiovascular-regular rate rhythm no murmurs gallops rubs  Respiratory-bilateral sonorous breath sounds noted. CTA Bilaterally.  Abdomen-soft, nontender nondistended.  Bowel sounds are normoactive.  Extremities-no cyanosis, clubbing, edema.  Pulses are 2+ and symmetric.  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  Right emelia-neglect also noted. Patient gait was not assessed secondary to hemiplegia.    Significant Labs: All pertinent labs within the past  24 hours have been reviewed.    Significant Imaging: I have reviewed all pertinent imaging results/findings within the past 24 hours.

## 2018-07-28 NOTE — PT/OT/SLP PROGRESS
"Physical Therapy Treatment    Patient Name:  Kel Lock   MRN:  [id number]    Recommendations:     Discharge Recommendations:  acute care hospital   Discharge Equipment Recommendations:     Barriers to discharge: None    Assessment:     Kel Lock is a 67 y.o. male admitted with a medical diagnosis of Cerebrovascular accident (CVA) due to bilateral thrombosis of carotid arteries.  He presents with the following impairments/functional limitations:  weakness, impaired endurance, impaired sensation, impaired self care skills, impaired functional mobilty, gait instability, impaired balance, visual deficits, impaired cognition, decreased coordination, decreased upper extremity function, decreased lower extremity function, decreased safety awareness, abnormal tone, decreased ROM, impaired coordination, impaired fine motor, impaired cardiopulmonary response to activity.    Rehab Prognosis:  Fair; patient would benefit from acute skilled PT services to address these deficits and reach maximum level of function.      Recent Surgery: * No surgery found *      Plan:     During this hospitalization, patient to be seen daily to address the above listed problems via therapeutic activities, therapeutic exercises  · Plan of Care Expires:  08/31/18   Plan of Care Reviewed with: patient    Subjective     Communicated with nurseAgueda prior to session.  Patient found supine in bed upon PT entry to room, agreeable to treatment.      Chief Complaint: Unable to move R LE  Patient comments/goals: "Do you know where my daughter is?"  Pain/Comfort:  ·      Patients cultural, spiritual, Evangelical conflicts given the current situation: none reported     Objective:     Patient found with:       General Precautions: Standard, fall, aphasia, aspiration, vision impaired   Orthopedic Precautions:N/A   Braces:       Functional Mobility:  · Bed Mobility:     · Rolling Left:  maximal assistance x 2 people  · Sit to supine/supine to sit max A x " 2 people    Sitting balance at edge of bed with max A due to pt leaning posteriorly and to R side.      AM-PAC 6 CLICK MOBILITY          Therapeutic Activities and Exercises:   Seated there ex x 15 reps AP (unable on R), LAQ B LE(with facilitation to R knee), hip flex (with difficulty on R)     Patient left supine with call button in reach..    GOALS:    Physical Therapy Goals        Problem: Physical Therapy Goal    Goal Priority Disciplines Outcome Goal Variances Interventions   Physical Therapy Goal     PT/OT, PT Ongoing (interventions implemented as appropriate)     Description:  Goals to be met by: 2018     Patient will increase functional independence with mobility by performin. Supine to sit with Maximum Assistance  2. Rolling to Left and Right with Moderate Assistance.  3. Sitting at edge of bed x15 minutes with Maximum Assistance  4. Increased functional strength to 2/5 for RLE    .  Goals to be met by: 08/10/2018     Patient will increase functional independence with mobility by performin. Pt will be able to tolerate Supine.  2. Pt will be able to tolerate Sit to supine  3. Pt will be able to tolerate Sit to stand transfer   4. Pt will be able to tolerate Bed to chair transfer with most appropriate device.  5. Pt will be able to tolerate Gait  Activity using most appropriate device.     Goal edited 2018: Goals to be met 2018.    1. Pt will be able to tolerate supine<>sit mod A.  2. Pt will perform supine BLE exercises (actively/passively) x 10-20 as tolerated by patient.   3. Pt will tolerate sit<>stand, stand pivot transfers max A.                         Time Tracking:     PT Received On: 18  PT Start Time: 1037     PT Stop Time: 1049  PT Total Time (min): 12 min     Billable Minutes: Therapeutic Exercise 8, therapeutic activities 4    Treatment Type: Treatment  PT/PTA: PTA     PTA Visit Number: 1     Juanis Su, PTA  2018

## 2018-07-28 NOTE — PLAN OF CARE
07/28/18 0818   Patient Assessment/Suction   Level of Consciousness (AVPU) alert   All Lung Fields Breath Sounds diminished   PRE-TX-O2-ETCO2   O2 Device (Oxygen Therapy) nasal cannula   $ Is the patient on Low Flow Oxygen? Yes   Flow (L/min) 3   Oxygen Concentration (%) 32   SpO2 96 %   Pulse Oximetry Type Intermittent   $ Pulse Oximetry - Multiple Charge Pulse Oximetry - Multiple   Pulse 60   Resp 18   Post-Treatment   Post-treatment Heart Rate (beats/min) 58   Post-treatment Resp Rate (breaths/min) 18   All Fields Breath Sounds unchanged   IPPB   $ IPPB Charge IPPB given   Respiratory Treatment Status given   IPPB Route mouthpiece   IPPB Pressure (cm H2O) 20   IPPB Patient Tolerance poor   Ready to Wean/Extubation Screen   FIO2<=50 (chart decimal) 0.32

## 2018-07-28 NOTE — SUBJECTIVE & OBJECTIVE
Interval History: No acute events over night. Patient continues to show improvement in his mental status and lethargy.  Apparently took 100% of breakfast this AM.  Plan of care reviewed with the nurse and the patient at the bedside in detail.  Also, plan of care reviewed with the patient's daughter.     Review of Systems   Unable to perform ROS: Mental status change     Objective:     Vital Signs (Most Recent):  Temp: 96.2 °F (35.7 °C) (07/28/18 1138)  Pulse: 61 (07/28/18 1138)  Resp: 18 (07/28/18 1138)  BP: 118/63 (07/28/18 1138)  SpO2: (!) 94 % (07/28/18 1138) Vital Signs (24h Range):  Temp:  [96.2 °F (35.7 °C)-98.3 °F (36.8 °C)] 96.2 °F (35.7 °C)  Pulse:  [53-63] 61  Resp:  [16-18] 18  SpO2:  [93 %-97 %] 94 %  BP: (109-123)/(55-82) 118/63     Weight: 84.6 kg (186 lb 8.2 oz)  Body mass index is 28.36 kg/m².    Intake/Output Summary (Last 24 hours) at 07/28/18 1340  Last data filed at 07/28/18 0600   Gross per 24 hour   Intake              440 ml   Output                0 ml   Net              440 ml      Physical Exam   Constitutional: He appears well-developed and well-nourished. No distress.   Eyes: EOM are normal.   Pupils sluggish but reactive.   Neck: Normal range of motion.   Cardiovascular: Normal rate, regular rhythm and normal heart sounds.    No murmur heard.  Pulmonary/Chest: Effort normal and breath sounds normal.   Abdominal: Soft. Bowel sounds are normal. He exhibits no distension. There is no tenderness.   Musculoskeletal:   + R hemiplegia   + R emelia-neglect   Neurological: He is alert.   Nursing note and vitals reviewed.      Significant Labs: All pertinent labs within the past 24 hours have been reviewed.    Significant Imaging: I have reviewed all pertinent imaging results/findings within the past 24 hours.

## 2018-07-28 NOTE — ASSESSMENT & PLAN NOTE
Will stop clonidine secondary to lethargy and switch to metoprolol low-dose with a very close eye on the patient's hypotension which is likely related to autonomic dysregulation.

## 2018-07-28 NOTE — NURSING
Pt has an extremely good appetite. Pt eats % of his meals and drinks 3/4 to all of his Boost glucose control supplements and all of his juice for each meal. Pt also is able to feed himself with his left arm if tray is set up for him. Pt is sat up all the way in th bed and supervision is provided in case of choking/aspiration. Pt tolerating diet well with no coughing or signs of aspiration.

## 2018-07-28 NOTE — PT/OT/SLP PROGRESS
Speech Language Pathology Treatment    Patient Name:  Kel Lock   MRN:  [id number]  Admitting Diagnosis: Cerebrovascular accident (CVA) due to bilateral thrombosis of carotid arteries    Recommendations:                 General Recommendations:  Dysphagia therapy and Cognitive-linguistic therapy  Diet recommendations:  Dental Soft, Chopped meat, Liquid Diet Level: Thin (single straw sips)   Aspiration Precautions: bed in chair position or OOB for meals, 1 bite/sip at a time, Assistance with meals, Check for pocketing/oral residue, Feed only when awake/alert, HOB to 90 degrees, Meds whole buried in puree, Small bites/sips and Strict aspiration precautions   General Precautions: Standard, aphasia, fall, aspiration, vision impaired  Communication strategies:  provide increased time to answer    Subjective     My hand...  Patient goals: unable to state       Objective:     Has the patient been evaluated by SLP for swallowing?   Yes  Keep patient NPO? No   Current Respiratory Status: nasal cannula      Pt seen for upgrade of diet & cognitive-linguistic tx.  Initially lethargic, but HOB raised & given physical cues e became alert & participated well in tx.  See Care Plan for tx details.    Assessment:     Kel Lock is a 67 y.o. male with an SLP diagnosis of Aphasia, Dysphagia and Cognitive-Linguistic Impairment.  He tolerated diet upgrade trial given close supervision to follow Swallowing Guidelines.  Pt required Aaron to complete receptive language tasks & mod-maxA to complete expressive language tasks.  Cont POC    Goals:    SLP Goals        Problem: SLP Goal    Goal Priority Disciplines Outcome   SLP Goal     SLP Ongoing (interventions implemented as appropriate)   Description:  1. Ongoing BSS - MET  2. Complete cognitive-linguistic evaluation - MET  3. Orientation x 4 SBA, 90% accuracy/  4. Automatics 100% independently.   5. Follow 3-step commands, 90% accuracy, independently.   6. Demonstrate comprehension of  3-sentence paragraph, 75% accuracy, independently.    7. NEW: Pt will participate in MBSS for further evaluation of swallow function--MET  8. Laryngeal elevation exercises with MIN A  9. Tolerate soft solids with functional/timely oral phase provided SPV                    Plan:     · Patient to be seen:  5 x/week   · Plan of Care expires:  08/02/18  · Plan of Care reviewed with:  patient   · SLP Follow-Up:  Yes       Discharge recommendations:  acute care hospital   Barriers to Discharge:  None    Time Tracking:     SLP Treatment Date:   07/28/18  Speech Start Time:  1012  Speech Stop Time:  1045     Speech Total Time (min):  33 min    Billable Minutes: Speech Therapy Individual 21, Treatment Swallowing Dysfunction 12 and Total Time 33    Araceli Andino CCC-SLP/A  07/28/2018

## 2018-07-28 NOTE — PLAN OF CARE
Problem: Patient Care Overview  Goal: Plan of Care Review  Outcome: Ongoing (interventions implemented as appropriate)  Fall and safety precautions maintained. VSS. SR on the monitor. Pt still exhibits Rt sided hemiplegia, aphasic at times. Weight shift assistance provied q2h. Tentative plan for peg tube placement Monday, however pt has been eating very well. Calorie count in progress. Side rails up x2, call light in reach. Will continue to monitor.

## 2018-07-29 LAB
ALBUMIN SERPL BCP-MCNC: 2.2 G/DL
ALP SERPL-CCNC: 76 U/L
ALT SERPL W/O P-5'-P-CCNC: 94 U/L
ANION GAP SERPL CALC-SCNC: 11 MMOL/L
AST SERPL-CCNC: 40 U/L
BASOPHILS # BLD AUTO: 0 K/UL
BASOPHILS NFR BLD: 0.4 %
BILIRUB SERPL-MCNC: 0.3 MG/DL
BUN SERPL-MCNC: 14 MG/DL
CALCIUM SERPL-MCNC: 8.5 MG/DL
CHLORIDE SERPL-SCNC: 104 MMOL/L
CO2 SERPL-SCNC: 24 MMOL/L
CREAT SERPL-MCNC: 1 MG/DL
DIFFERENTIAL METHOD: ABNORMAL
EOSINOPHIL # BLD AUTO: 0.3 K/UL
EOSINOPHIL NFR BLD: 2.9 %
ERYTHROCYTE [DISTWIDTH] IN BLOOD BY AUTOMATED COUNT: 14 %
EST. GFR  (AFRICAN AMERICAN): >60 ML/MIN/1.73 M^2
EST. GFR  (NON AFRICAN AMERICAN): >60 ML/MIN/1.73 M^2
GLUCOSE SERPL-MCNC: 114 MG/DL
HCT VFR BLD AUTO: 37.8 %
HGB BLD-MCNC: 12.2 G/DL
LYMPHOCYTES # BLD AUTO: 1.7 K/UL
LYMPHOCYTES NFR BLD: 18.7 %
MAGNESIUM SERPL-MCNC: 2 MG/DL
MCH RBC QN AUTO: 29.5 PG
MCHC RBC AUTO-ENTMCNC: 32.2 G/DL
MCV RBC AUTO: 92 FL
MONOCYTES # BLD AUTO: 0.5 K/UL
MONOCYTES NFR BLD: 4.9 %
NEUTROPHILS # BLD AUTO: 6.8 K/UL
NEUTROPHILS NFR BLD: 73.1 %
PHOSPHATE SERPL-MCNC: 3.6 MG/DL
PLATELET # BLD AUTO: 310 K/UL
PMV BLD AUTO: 9.3 FL
POCT GLUCOSE: 118 MG/DL (ref 70–110)
POCT GLUCOSE: 200 MG/DL (ref 70–110)
POCT GLUCOSE: 217 MG/DL (ref 70–110)
POCT GLUCOSE: 304 MG/DL (ref 70–110)
POTASSIUM SERPL-SCNC: 3.4 MMOL/L
PROT SERPL-MCNC: 6.3 G/DL
RBC # BLD AUTO: 4.12 M/UL
SODIUM SERPL-SCNC: 139 MMOL/L
WBC # BLD AUTO: 9.3 K/UL

## 2018-07-29 PROCEDURE — 12000002 HC ACUTE/MED SURGE SEMI-PRIVATE ROOM

## 2018-07-29 PROCEDURE — 94761 N-INVAS EAR/PLS OXIMETRY MLT: CPT

## 2018-07-29 PROCEDURE — 94640 AIRWAY INHALATION TREATMENT: CPT

## 2018-07-29 PROCEDURE — 83735 ASSAY OF MAGNESIUM: CPT

## 2018-07-29 PROCEDURE — 97530 THERAPEUTIC ACTIVITIES: CPT

## 2018-07-29 PROCEDURE — 27000221 HC OXYGEN, UP TO 24 HOURS

## 2018-07-29 PROCEDURE — 36415 COLL VENOUS BLD VENIPUNCTURE: CPT

## 2018-07-29 PROCEDURE — 85025 COMPLETE CBC W/AUTO DIFF WBC: CPT

## 2018-07-29 PROCEDURE — 63600175 PHARM REV CODE 636 W HCPCS: Performed by: INTERNAL MEDICINE

## 2018-07-29 PROCEDURE — 63600175 PHARM REV CODE 636 W HCPCS: Performed by: HOSPITALIST

## 2018-07-29 PROCEDURE — 25000003 PHARM REV CODE 250: Performed by: INTERNAL MEDICINE

## 2018-07-29 PROCEDURE — 80053 COMPREHEN METABOLIC PANEL: CPT

## 2018-07-29 PROCEDURE — 84100 ASSAY OF PHOSPHORUS: CPT

## 2018-07-29 PROCEDURE — 25000003 PHARM REV CODE 250: Performed by: HOSPITALIST

## 2018-07-29 PROCEDURE — A4216 STERILE WATER/SALINE, 10 ML: HCPCS | Performed by: INTERNAL MEDICINE

## 2018-07-29 RX ADMIN — PIPERACILLIN SODIUM AND TAZOBACTAM SODIUM 4.5 G: 4; .5 INJECTION, POWDER, FOR SOLUTION INTRAVENOUS at 10:07

## 2018-07-29 RX ADMIN — BUSPIRONE HYDROCHLORIDE 5 MG: 5 TABLET ORAL at 10:07

## 2018-07-29 RX ADMIN — PAROXETINE HYDROCHLORIDE 40 MG: 20 TABLET, FILM COATED ORAL at 05:07

## 2018-07-29 RX ADMIN — PIPERACILLIN SODIUM AND TAZOBACTAM SODIUM 4.5 G: 4; .5 INJECTION, POWDER, FOR SOLUTION INTRAVENOUS at 05:07

## 2018-07-29 RX ADMIN — INSULIN ASPART 4 UNITS: 100 INJECTION, SOLUTION INTRAVENOUS; SUBCUTANEOUS at 05:07

## 2018-07-29 RX ADMIN — ASPIRIN 325 MG ORAL TABLET 325 MG: 325 PILL ORAL at 10:07

## 2018-07-29 RX ADMIN — METOPROLOL SUCCINATE 25 MG: 25 TABLET, EXTENDED RELEASE ORAL at 10:07

## 2018-07-29 RX ADMIN — PIPERACILLIN SODIUM AND TAZOBACTAM SODIUM 4.5 G: 4; .5 INJECTION, POWDER, FOR SOLUTION INTRAVENOUS at 01:07

## 2018-07-29 RX ADMIN — SODIUM CHLORIDE, PRESERVATIVE FREE 3 ML: 5 INJECTION INTRAVENOUS at 02:07

## 2018-07-29 RX ADMIN — SODIUM CHLORIDE, PRESERVATIVE FREE 3 ML: 5 INJECTION INTRAVENOUS at 05:07

## 2018-07-29 RX ADMIN — SODIUM CHLORIDE, PRESERVATIVE FREE 3 ML: 5 INJECTION INTRAVENOUS at 09:07

## 2018-07-29 RX ADMIN — INSULIN ASPART 4 UNITS: 100 INJECTION, SOLUTION INTRAVENOUS; SUBCUTANEOUS at 08:07

## 2018-07-29 RX ADMIN — INSULIN ASPART 2 UNITS: 100 INJECTION, SOLUTION INTRAVENOUS; SUBCUTANEOUS at 09:07

## 2018-07-29 RX ADMIN — INSULIN ASPART 4 UNITS: 100 INJECTION, SOLUTION INTRAVENOUS; SUBCUTANEOUS at 12:07

## 2018-07-29 RX ADMIN — TRAZODONE HYDROCHLORIDE 50 MG: 50 TABLET ORAL at 09:07

## 2018-07-29 RX ADMIN — ENOXAPARIN SODIUM 40 MG: 100 INJECTION SUBCUTANEOUS at 05:07

## 2018-07-29 NOTE — ASSESSMENT & PLAN NOTE
Patient with devastating CVA bilateral internal carotid stenosis. Hold statin secondary to elevated Trans-aminases and hold plavix for potential procedure. Continue ASA.  Patient is a good candidate for inpatient rehab given his rapid improvement would likely benefit from intensive inpatient rehab. Rehab eval pending. Neurology consulted.

## 2018-07-29 NOTE — PROGRESS NOTES
07/29/18 0059   Patient Assessment/Suction   Level of Consciousness (AVPU) alert   All Lung Fields Breath Sounds diminished   PRE-TX-O2-ETCO2   O2 Device (Oxygen Therapy) nasal cannula   Flow (L/min) 3   Oxygen Concentration (%) 32   Pulse Oximetry Type Intermittent   Pulse 60   Resp 18   IPPB   $ IPPB Charge IPPB given   Respiratory Treatment Status given   IPPB Route mouthpiece   IPPB Patient Tolerance poor   Ready to Wean/Extubation Screen   FIO2<=50 (chart decimal) 0.32

## 2018-07-29 NOTE — PROGRESS NOTES
07/28/18 1951   Patient Assessment/Suction   Level of Consciousness (AVPU) alert   All Lung Fields Breath Sounds diminished   PRE-TX-O2-ETCO2   O2 Device (Oxygen Therapy) nasal cannula   Flow (L/min) 3   Oxygen Concentration (%) 32   SpO2 96 %   Pulse Oximetry Type Intermittent   Pulse 61   Resp 18   IPPB   $ IPPB Charge Other (see comments)  (pt asleep)   Ready to Wean/Extubation Screen   FIO2<=50 (chart decimal) 0.32

## 2018-07-29 NOTE — PLAN OF CARE
Problem: Patient Care Overview  Goal: Plan of Care Review  Outcome: Ongoing (interventions implemented as appropriate)  Patient receiving IPPB via mouthpiece with 3cc nacl.  Hr 63 and 02 saturation 95% on nc at 3lpm.

## 2018-07-29 NOTE — PT/OT/SLP PROGRESS
"Physical Therapy Treatment    Patient Name:  Kel Lock   MRN:  [id number]    Recommendations:     Discharge Recommendations:      Discharge Equipment Recommendations:     Barriers to discharge:      Assessment:     Kel Lock is a 67 y.o. male admitted with a medical diagnosis of Cerebrovascular accident (CVA) due to bilateral thrombosis of carotid arteries.  He presents with the following impairments/functional limitations:   .    Rehab Prognosis:  Fair; patient would benefit from acute skilled PT services to address these deficits and reach maximum level of function.      Recent Surgery: * No surgery found *      Plan:     During this hospitalization, patient to be seen daily to address the above listed problems via therapeutic activities, therapeutic exercises  · Plan of Care Expires:  07/31/18   Plan of Care Reviewed with: patient    Subjective     Communicated with patient and nursing prior to session.  Patient found supine, sleeping upon PT entry to room, agreeable to treatment.      Chief Complaint: None   Patient comments/goals: "I want to sleep"  Pain/Comfort:  · Pain Rating 1: 0/10  · Pain Rating Post-Intervention 1: 0/10    Patients cultural, spiritual, Jainism conflicts given the current situation: none reported     Objective:     Patient found with: oxygen     General Precautions: Standard, fall, aspiration, vision impaired   Orthopedic Precautions:N/A   Braces:       Functional Mobility:  · ROlling R Max A  · Sup to sit Max A  · Sit to sup Max A  · Sitting balance at EOB: Poor      AM-PAC 6 CLICK MOBILITY  Turning over in bed (including adjusting bedclothes, sheets and blankets)?: 2  Sitting down on and standing up from a chair with arms (e.g., wheelchair, bedside commode, etc.): 2  Moving from lying on back to sitting on the side of the bed?: 2  Moving to and from a bed to a chair (including a wheelchair)?: 2  Need to walk in hospital room?: 1  Climbing 3-5 steps with a railing?: 1  Basic " Mobility Total Score: 10       Therapeutic Activities and Exercises:   AP, LAQ, Marching x 15  Sitting EOB x 15' with max A for upright posturing    Patient left supine with all lines intact, call button in reach and nursing notified..    GOALS:    Physical Therapy Goals        Problem: Physical Therapy Goal    Goal Priority Disciplines Outcome Goal Variances Interventions   Physical Therapy Goal     PT/OT, PT Ongoing (interventions implemented as appropriate)     Description:  Goals to be met by: 2018     Patient will increase functional independence with mobility by performin. Supine to sit with Maximum Assistance  2. Rolling to Left and Right with Moderate Assistance.  3. Sitting at edge of bed x15 minutes with Maximum Assistance  4. Increased functional strength to 2/5 for RLE    .  Goals to be met by: 08/10/2018     Patient will increase functional independence with mobility by performin. Pt will be able to tolerate Supine.  2. Pt will be able to tolerate Sit to supine  3. Pt will be able to tolerate Sit to stand transfer   4. Pt will be able to tolerate Bed to chair transfer with most appropriate device.  5. Pt will be able to tolerate Gait  Activity using most appropriate device.     Goal edited 2018: Goals to be met 2018.    1. Pt will be able to tolerate supine<>sit mod A.  2. Pt will perform supine BLE exercises (actively/passively) x 10-20 as tolerated by patient.   3. Pt will tolerate sit<>stand, stand pivot transfers max A.                         Time Tracking:     PT Received On: 18  PT Start Time: 1130     PT Stop Time: 1149  PT Total Time (min): 19 min     Billable Minutes: Therapeutic Activity 19    Treatment Type: Treatment  PT/PTA: PTA     PTA Visit Number: 2     Frieda Odom, PTA  2018

## 2018-07-29 NOTE — PLAN OF CARE
Problem: Patient Care Overview  Goal: Plan of Care Review  Outcome: Ongoing (interventions implemented as appropriate)  Patient AAOx4. POC reviewed with patient. Verbalized understanding. IV antibiotics given during shift. Tele intact. 02 at 2L/min NC. Patient refuses c-pap at night.  Patient has right sided hemiplegia.  Patient tolerating a puree diet well. No complaints of N/v. Aspiration precautions maintained during shift. .Patient incontinent to B&B, brief changed PRN. Hourly rounding on patient to promote safety. Safety maintained throughout the shift. No new skin break down noted during shift. Bed locked and in lowest position. Call light in reach. Side rails up x2. Patient remained free of falls/ trauma.  Will continue to monitor.

## 2018-07-29 NOTE — PROGRESS NOTES
[facility] Hospital Medicine  Progress Note    Patient Name: Kel Lock  MRN: [id number]  Patient Class: IP- Inpatient   Admission Date: 7/15/2018  Length of Stay: 14 days  Attending Physician: Elmo Mata,*  Primary Care Provider: [facility]        Subjective:     Principal Problem:Cerebrovascular accident (CVA) due to bilateral thrombosis of carotid arteries    HPI:  Mr. Lock is a 67 YOCM with PMH of HTN, DM, HLD, who was last seen by his ex-wife 3 days ago, normal , and apparently was found lying on floor unable to move himself back to the bed after he had a fall, heard by his roommate. The above statement was obtained from the medical record. Family at bed sided include his daughter, son in-law, ex wife, who have not witness any change in his clinical status.   He was presented to the [facility] ER with right sided weakness, slurred speech and altered mental status. Telestroke was consulted who calculated his NIH value at 16. Initial CT head showed areas of moderate involutional changes consistent with microvascular ischemic changes with encephalomalacia watershed area of distribution, and subacute infarcts at right and left parieto-occipital cortex.   Pt was communicated with neurology, apparently who recommended MRI of the brain without contrast.   At the time of my interview with the patient, he denied CP, SOB or HA, problem with vision, but he clearly has left preference and right hemineglect. The pt is asking for water since he has touched the floor.       Hospital Course:  No notes on file    Interval History: No acute events over night. Patient's MS continues to improve. Tolerating diet, eating % meals yesterday. Plan of care reviewed with the nurse and the patient at the bedside in detail.    Review of Systems   Unable to perform ROS: Mental status change     Objective:     Vital Signs (Most Recent):  Temp: 98.2 °F (36.8 °C) (07/29/18  0746)  Pulse: (!) 55 (07/29/18 0746)  Resp: 20 (07/29/18 0746)  BP: (!) 152/70 (07/29/18 0746)  SpO2: 96 % (07/29/18 0746) Vital Signs (24h Range):  Temp:  [96.2 °F (35.7 °C)-98.4 °F (36.9 °C)] 98.2 °F (36.8 °C)  Pulse:  [55-65] 55  Resp:  [18-20] 20  SpO2:  [79 %-96 %] 96 %  BP: (104-152)/(53-74) 152/70     Weight: 88.5 kg (195 lb)  Body mass index is 29.65 kg/m².    Intake/Output Summary (Last 24 hours) at 07/29/18 0915  Last data filed at 07/29/18 0501   Gross per 24 hour   Intake             1220 ml   Output                0 ml   Net             1220 ml      Physical Exam   Constitutional: He appears well-developed and well-nourished. No distress.   HENT:   Head: Normocephalic and atraumatic.   Eyes: EOM are normal.   Pupils sluggish but reactive.   Neck: Normal range of motion.   Cardiovascular: Normal rate, regular rhythm and normal heart sounds.    No murmur heard.  Pulmonary/Chest: Effort normal and breath sounds normal.   Abdominal: Soft. Bowel sounds are normal. He exhibits no distension. There is no tenderness.   Musculoskeletal:   + R hemiplegia   + R emelia-neglect   Neurological: He is alert.   Skin: Skin is warm and dry.   Nursing note and vitals reviewed.      Significant Labs: All pertinent labs within the past 24 hours have been reviewed.    Significant Imaging: I have reviewed all pertinent imaging results/findings within the past 24 hours.    Assessment/Plan:      * Cerebrovascular accident (CVA) due to bilateral thrombosis of carotid arteries    Patient with devastating CVA bilateral internal carotid stenosis. Hold statin secondary to elevated Trans-aminases and hold plavix for potential procedure. Continue ASA.  Patient is a good candidate for inpatient rehab given his rapid improvement would likely benefit from intensive inpatient rehab. Rehab eval pending. Neurology consulted.        Dysphagia    Improving remarkably.  Continue speech language pathology treatment for swallowing and cognitive  dysfunction as well as speech dysfunction.  Monitor patient swallow and advance diet as directed by SLP. Will hold off on PEG for now.        Encephalopathy, metabolic    Multiple potential underlying etiologies, with neurologic(CVA) and metabolic derangements being most common and potential hypercarbia as well.  Will continue to monitor mental status closely.          Acute hypercapnic respiratory failure    Likely d/t aspiration and fluctuating mental status. Improving slowly. PRN BIPAP. Secretion management.            Malnutrition of moderate degree    Nutrition consulted. Body mass index is 28.36 kg/m².. Encourage maximal PO intake. Diet supplementation ordered per nutrition approval. Will encourage PO and monitor closely for weight changes. Calorie count in progress.          HLD (hyperlipidemia)     Patient is chronically on statin. Will continue for now. Monitor clinically. Last LDL was   Lab Results   Component Value Date    LDLCALC 63.8 07/15/2018                Essential hypertension    Will stop clonidine secondary to lethargy and switch to metoprolol low-dose with a very close eye on the patient's hypotension which is likely related to autonomic dysregulation.        Type 2 diabetes mellitus    Uncontrolled. Will increase basal insulin and continue pre meal Novolog at 4U. Monitor sliding scale closely.     Lab Results   Component Value Date    HGBA1C 7.2 (H) 07/15/2018     Most recent fingerstick glucose reviewed-     Recent Labs  Lab 07/28/18  1106 07/28/18  1608 07/28/18  2111 07/29/18  0531   POCTGLUCOSE 213* 151* 240* 118*     Current correctional scale  Low  Maintain anti-hyperglycemic dose as follows-   Antihyperglycemics     Start     Stop Route Frequency Ordered    07/27/18 1130  insulin aspart U-100 pen 4 Units      -- SubQ 3 times daily with meals 07/27/18 0932    07/26/18 2100  insulin detemir U-100 pen 10 Units      -- SubQ Nightly 07/26/18 0928    07/17/18 1744  insulin aspart U-100 pen 0-5  Units      -- SubQ As needed (PRN) 07/17/18 1644                  VTE Risk Mitigation         Ordered     enoxaparin injection 40 mg  Daily      07/17/18 0938     IP VTE HIGH RISK PATIENT  Once      07/15/18 1152              Elmo Mata MD  Department of Hospital Medicine   [facility]

## 2018-07-29 NOTE — ASSESSMENT & PLAN NOTE
Uncontrolled. Will increase basal insulin and continue pre meal Novolog at 4U. Monitor sliding scale closely.     Lab Results   Component Value Date    HGBA1C 7.2 (H) 07/15/2018     Most recent fingerstick glucose reviewed-     Recent Labs  Lab 07/28/18  1106 07/28/18  1608 07/28/18  2111 07/29/18  0531   POCTGLUCOSE 213* 151* 240* 118*     Current correctional scale  Low  Maintain anti-hyperglycemic dose as follows-   Antihyperglycemics     Start     Stop Route Frequency Ordered    07/27/18 1130  insulin aspart U-100 pen 4 Units      -- SubQ 3 times daily with meals 07/27/18 0932    07/26/18 2100  insulin detemir U-100 pen 10 Units      -- SubQ Nightly 07/26/18 0928    07/17/18 1744  insulin aspart U-100 pen 0-5 Units      -- SubQ As needed (PRN) 07/17/18 1644

## 2018-07-29 NOTE — ASSESSMENT & PLAN NOTE
Improving remarkably.  Continue speech language pathology treatment for swallowing and cognitive dysfunction as well as speech dysfunction.  Monitor patient swallow and advance diet as directed by SLP. Will hold off on PEG for now.

## 2018-07-29 NOTE — SUBJECTIVE & OBJECTIVE
Interval History: No acute events over night. Patient's MS continues to improve. Tolerating diet, eating % meals yesterday. Plan of care reviewed with the nurse and the patient at the bedside in detail.    Review of Systems   Unable to perform ROS: Mental status change     Objective:     Vital Signs (Most Recent):  Temp: 98.2 °F (36.8 °C) (07/29/18 0746)  Pulse: (!) 55 (07/29/18 0746)  Resp: 20 (07/29/18 0746)  BP: (!) 152/70 (07/29/18 0746)  SpO2: 96 % (07/29/18 0746) Vital Signs (24h Range):  Temp:  [96.2 °F (35.7 °C)-98.4 °F (36.9 °C)] 98.2 °F (36.8 °C)  Pulse:  [55-65] 55  Resp:  [18-20] 20  SpO2:  [79 %-96 %] 96 %  BP: (104-152)/(53-74) 152/70     Weight: 88.5 kg (195 lb)  Body mass index is 29.65 kg/m².    Intake/Output Summary (Last 24 hours) at 07/29/18 0915  Last data filed at 07/29/18 0501   Gross per 24 hour   Intake             1220 ml   Output                0 ml   Net             1220 ml      Physical Exam   Constitutional: He appears well-developed and well-nourished. No distress.   HENT:   Head: Normocephalic and atraumatic.   Eyes: EOM are normal.   Pupils sluggish but reactive.   Neck: Normal range of motion.   Cardiovascular: Normal rate, regular rhythm and normal heart sounds.    No murmur heard.  Pulmonary/Chest: Effort normal and breath sounds normal.   Abdominal: Soft. Bowel sounds are normal. He exhibits no distension. There is no tenderness.   Musculoskeletal:   + R hemiplegia   + R emelia-neglect   Neurological: He is alert.   Skin: Skin is warm and dry.   Nursing note and vitals reviewed.      Significant Labs: All pertinent labs within the past 24 hours have been reviewed.    Significant Imaging: I have reviewed all pertinent imaging results/findings within the past 24 hours.

## 2018-07-29 NOTE — PLAN OF CARE
Problem: Physical Therapy Goal  Goal: Physical Therapy Goal  Goals to be met by: 2018     Patient will increase functional independence with mobility by performin. Supine to sit with Maximum Assistance  2. Rolling to Left and Right with Moderate Assistance.  3. Sitting at edge of bed x15 minutes with Maximum Assistance  4. Increased functional strength to 2/5 for RLE    .  Goals to be met by: 08/10/2018     Patient will increase functional independence with mobility by performin. Pt will be able to tolerate Supine.  2. Pt will be able to tolerate Sit to supine  3. Pt will be able to tolerate Sit to stand transfer   4. Pt will be able to tolerate Bed to chair transfer with most appropriate device.  5. Pt will be able to tolerate Gait  Activity using most appropriate device.     Goal edited 2018: Goals to be met 2018.    1. Pt will be able to tolerate supine<>sit mod A.  2. Pt will perform supine BLE exercises (actively/passively) x 10-20 as tolerated by patient.   3. Pt will tolerate sit<>stand, stand pivot transfers max A.        Outcome: Ongoing (interventions implemented as appropriate)  Sup<>sit with Max A, Sit balance with max A, L LE TE x 15, scoot to HOB D A

## 2018-07-30 VITALS
HEART RATE: 65 BPM | SYSTOLIC BLOOD PRESSURE: 141 MMHG | TEMPERATURE: 98 F | BODY MASS INDEX: 29.55 KG/M2 | OXYGEN SATURATION: 94 % | RESPIRATION RATE: 18 BRPM | WEIGHT: 195 LBS | DIASTOLIC BLOOD PRESSURE: 67 MMHG | HEIGHT: 68 IN

## 2018-07-30 LAB
ALBUMIN SERPL BCP-MCNC: 2.1 G/DL
ALP SERPL-CCNC: 72 U/L
ALT SERPL W/O P-5'-P-CCNC: 81 U/L
ANION GAP SERPL CALC-SCNC: 7 MMOL/L
AST SERPL-CCNC: 42 U/L
BASOPHILS # BLD AUTO: 0.1 K/UL
BASOPHILS NFR BLD: 0.5 %
BILIRUB SERPL-MCNC: 0.2 MG/DL
BUN SERPL-MCNC: 12 MG/DL
CALCIUM SERPL-MCNC: 8.5 MG/DL
CHLORIDE SERPL-SCNC: 105 MMOL/L
CO2 SERPL-SCNC: 28 MMOL/L
CREAT SERPL-MCNC: 1.1 MG/DL
DIFFERENTIAL METHOD: ABNORMAL
EOSINOPHIL # BLD AUTO: 0.3 K/UL
EOSINOPHIL NFR BLD: 3.1 %
ERYTHROCYTE [DISTWIDTH] IN BLOOD BY AUTOMATED COUNT: 13.7 %
EST. GFR  (AFRICAN AMERICAN): >60 ML/MIN/1.73 M^2
EST. GFR  (NON AFRICAN AMERICAN): >60 ML/MIN/1.73 M^2
GLUCOSE SERPL-MCNC: 103 MG/DL
HCT VFR BLD AUTO: 35.1 %
HGB BLD-MCNC: 11.4 G/DL
LYMPHOCYTES # BLD AUTO: 1.5 K/UL
LYMPHOCYTES NFR BLD: 15.3 %
MAGNESIUM SERPL-MCNC: 2.2 MG/DL
MCH RBC QN AUTO: 29.5 PG
MCHC RBC AUTO-ENTMCNC: 32.4 G/DL
MCV RBC AUTO: 91 FL
MONOCYTES # BLD AUTO: 0.6 K/UL
MONOCYTES NFR BLD: 5.6 %
NEUTROPHILS # BLD AUTO: 7.6 K/UL
NEUTROPHILS NFR BLD: 75.5 %
PHOSPHATE SERPL-MCNC: 3.7 MG/DL
PLATELET # BLD AUTO: 324 K/UL
PMV BLD AUTO: 9.7 FL
POCT GLUCOSE: 170 MG/DL (ref 70–110)
POCT GLUCOSE: 225 MG/DL (ref 70–110)
POTASSIUM SERPL-SCNC: 3.5 MMOL/L
PROT SERPL-MCNC: 6.2 G/DL
RBC # BLD AUTO: 3.86 M/UL
SODIUM SERPL-SCNC: 140 MMOL/L
WBC # BLD AUTO: 10.1 K/UL

## 2018-07-30 PROCEDURE — 97530 THERAPEUTIC ACTIVITIES: CPT

## 2018-07-30 PROCEDURE — 25000003 PHARM REV CODE 250: Performed by: INTERNAL MEDICINE

## 2018-07-30 PROCEDURE — 85025 COMPLETE CBC W/AUTO DIFF WBC: CPT

## 2018-07-30 PROCEDURE — 25000003 PHARM REV CODE 250: Performed by: HOSPITALIST

## 2018-07-30 PROCEDURE — 36415 COLL VENOUS BLD VENIPUNCTURE: CPT

## 2018-07-30 PROCEDURE — 94761 N-INVAS EAR/PLS OXIMETRY MLT: CPT

## 2018-07-30 PROCEDURE — 80053 COMPREHEN METABOLIC PANEL: CPT

## 2018-07-30 PROCEDURE — 84100 ASSAY OF PHOSPHORUS: CPT

## 2018-07-30 PROCEDURE — 92507 TX SP LANG VOICE COMM INDIV: CPT

## 2018-07-30 PROCEDURE — 94640 AIRWAY INHALATION TREATMENT: CPT

## 2018-07-30 PROCEDURE — 27000221 HC OXYGEN, UP TO 24 HOURS

## 2018-07-30 PROCEDURE — A4216 STERILE WATER/SALINE, 10 ML: HCPCS | Performed by: INTERNAL MEDICINE

## 2018-07-30 PROCEDURE — 83735 ASSAY OF MAGNESIUM: CPT

## 2018-07-30 PROCEDURE — 63600175 PHARM REV CODE 636 W HCPCS: Performed by: INTERNAL MEDICINE

## 2018-07-30 RX ORDER — ENOXAPARIN SODIUM 100 MG/ML
40 INJECTION SUBCUTANEOUS DAILY
Start: 2018-07-30

## 2018-07-30 RX ORDER — INSULIN ASPART 100 [IU]/ML
0-5 INJECTION, SOLUTION INTRAVENOUS; SUBCUTANEOUS
Refills: 0
Start: 2018-07-30 | End: 2019-07-30

## 2018-07-30 RX ORDER — METOPROLOL SUCCINATE 25 MG/1
25 TABLET, EXTENDED RELEASE ORAL DAILY
Qty: 30 TABLET | Refills: 11 | Status: SHIPPED | OUTPATIENT
Start: 2018-07-31 | End: 2019-07-31

## 2018-07-30 RX ORDER — CLOPIDOGREL BISULFATE 75 MG/1
75 TABLET ORAL DAILY
Qty: 30 TABLET | Refills: 11 | Status: SHIPPED | OUTPATIENT
Start: 2018-07-30 | End: 2019-07-30

## 2018-07-30 RX ORDER — INSULIN ASPART 100 [IU]/ML
4 INJECTION, SOLUTION INTRAVENOUS; SUBCUTANEOUS 3 TIMES DAILY
Refills: 0
Start: 2018-07-30 | End: 2019-07-30

## 2018-07-30 RX ADMIN — INSULIN ASPART 2 UNITS: 100 INJECTION, SOLUTION INTRAVENOUS; SUBCUTANEOUS at 11:07

## 2018-07-30 RX ADMIN — BUSPIRONE HYDROCHLORIDE 5 MG: 5 TABLET ORAL at 09:07

## 2018-07-30 RX ADMIN — METOPROLOL SUCCINATE 25 MG: 25 TABLET, EXTENDED RELEASE ORAL at 09:07

## 2018-07-30 RX ADMIN — INSULIN ASPART 4 UNITS: 100 INJECTION, SOLUTION INTRAVENOUS; SUBCUTANEOUS at 11:07

## 2018-07-30 RX ADMIN — PIPERACILLIN SODIUM AND TAZOBACTAM SODIUM 4.5 G: 4; .5 INJECTION, POWDER, FOR SOLUTION INTRAVENOUS at 02:07

## 2018-07-30 RX ADMIN — ASPIRIN 325 MG ORAL TABLET 325 MG: 325 PILL ORAL at 09:07

## 2018-07-30 RX ADMIN — SODIUM CHLORIDE, PRESERVATIVE FREE 3 ML: 5 INJECTION INTRAVENOUS at 05:07

## 2018-07-30 RX ADMIN — ACETAMINOPHEN 650 MG: 650 SOLUTION ORAL at 11:07

## 2018-07-30 RX ADMIN — PAROXETINE HYDROCHLORIDE 40 MG: 20 TABLET, FILM COATED ORAL at 07:07

## 2018-07-30 RX ADMIN — PIPERACILLIN SODIUM AND TAZOBACTAM SODIUM 4.5 G: 4; .5 INJECTION, POWDER, FOR SOLUTION INTRAVENOUS at 09:07

## 2018-07-30 RX ADMIN — INSULIN ASPART 4 UNITS: 100 INJECTION, SOLUTION INTRAVENOUS; SUBCUTANEOUS at 07:07

## 2018-07-30 NOTE — PLAN OF CARE
07/29/18 1959   Patient Assessment/Suction   Level of Consciousness (AVPU) alert   All Lung Fields Breath Sounds diminished   Cough Frequency infrequent   Cough Type nonproductive;fair   PRE-TX-O2-ETCO2   O2 Device (Oxygen Therapy) nasal cannula   $ Is the patient on Low Flow Oxygen? Yes   Flow (L/min) 3   SpO2 (!) 93 %   Pulse Oximetry Type Intermittent   $ Pulse Oximetry - Multiple Charge Pulse Oximetry - Multiple   Pulse 62   Resp 18   Post-Treatment   Post-treatment Heart Rate (beats/min) 60   Post-treatment Resp Rate (breaths/min) 18   All Fields Breath Sounds unchanged   IPPB   $ IPPB Charge IPPB given   Respiratory Treatment Status given   IPPB Route mouthpiece   IPPB Pressure (cm H2O) 60   IPPB Patient Tolerance good   Preset CPAP/BiPAP Settings   Mode Of Delivery Standby

## 2018-07-30 NOTE — PLAN OF CARE
07/30/18 1443   Final Note   Assessment Type Final Discharge Note   Discharge Disposition Rehab  ([facility])   What phone number can be called within the next 1-3 days to see how you are doing after discharge? ([phone])   Hospital Follow Up  Appt(s) scheduled? No  (Pt discharging to [facility] re[facility].  Appointment not made as uncertain how long pt will be at the rehab. )

## 2018-07-30 NOTE — PLAN OF CARE
Problem: SLP Goal  Goal: SLP Goal  1. Ongoing BSS - MET  2. Complete cognitive-linguistic evaluation - MET  3. Orientation x 4 SBA, 90% accuracy/  4. Automatics 100% independently.   5. Follow 3-step commands, 90% accuracy, independently.   6. Demonstrate comprehension of 3-sentence paragraph, 75% accuracy, independently.    7. NEW: Pt will participate in MBSS for further evaluation of swallow function--MET  8. Laryngeal elevation exercises with MIN A  9. Tolerate soft solids with functional/timely oral phase provided SPV   Outcome: Ongoing (interventions implemented as appropriate)  Expressive language goals achieved

## 2018-07-30 NOTE — PLAN OF CARE
Problem: Physical Therapy Goal  Goal: Physical Therapy Goal  Goals to be met by: 2018     Patient will increase functional independence with mobility by performin. Supine to sit with Maximum Assistance  2. Rolling to Left and Right with Moderate Assistance.  3. Sitting at edge of bed x15 minutes with Maximum Assistance  4. Increased functional strength to 2/5 for RLE    .  Goals to be met by: 08/10/2018     Patient will increase functional independence with mobility by performin. Pt will be able to tolerate Supine.  2. Pt will be able to tolerate Sit to supine  3. Pt will be able to tolerate Sit to stand transfer   4. Pt will be able to tolerate Bed to chair transfer with most appropriate device.  5. Pt will be able to tolerate Gait  Activity using most appropriate device.     Goal edited 2018: Goals to be met 2018.    1. Pt will be able to tolerate supine<>sit mod A.  2. Pt will perform supine BLE exercises (actively/passively) x 10-20 as tolerated by patient.   3. Pt will tolerate sit<>stand, stand pivot transfers max A.        Outcome: Ongoing (interventions implemented as appropriate)  Pt seen for bed to wheelchair transfers assist x2. Pt with R paresis with visible muscle contractions. Pt was wheeled across unit and visited ICU njurses- pt interactive with clearer speech

## 2018-07-30 NOTE — PLAN OF CARE
[place] [facility] [place] would  pt at 5?45 p.m.   Updated pt's nurse Devi.      07/30/18 8308   Discharge Reassessment   Assessment Type Discharge Planning Reassessment   Discharge Plan A Rehab

## 2018-07-30 NOTE — PT/OT/SLP PROGRESS
Physical Therapy Treatment    Patient Name:  Kel Lock   MRN:  [id number]    Recommendations:     Discharge Recommendations:  rehabilitation facility   Discharge Equipment Recommendations:  (TBD)   Barriers to discharge: Decreased caregiver support    Assessment:     Kel Lock is a 67 y.o. male admitted with a medical diagnosis of Cerebrovascular accident (CVA) due to bilateral thrombosis of carotid arteries.  He presents with the following impairments/functional limitations:  weakness, impaired endurance, gait instability, impaired functional mobilty, impaired self care skills, impaired balance, visual deficits, decreased upper extremity function, decreased lower extremity function, decreased safety awareness, impaired cardiopulmonary response to activity . Pt more alert, interactive with clearer speech now. Muscle contractions now present RLE. Pt is an excellent rehab candidate    Rehab Prognosis:  fair; patient would benefit from acute skilled PT services to address these deficits and reach maximum level of function.      Recent Surgery: * No surgery found *      Plan:     During this hospitalization, patient to be seen daily to address the above listed problems via therapeutic activities, therapeutic exercises  · Plan of Care Expires:  08/31/18   Plan of Care Reviewed with: patient, daughter    Subjective     Communicated with nurse Ochoa prior to session.  Patient found supine upon PT entry to room, agreeable to treatment.    Daughter at bedside who stated pt now feeding self but messy  Pt with much clearer speech  Pt enjoyed being wheeled at hallways in w/c by daughter- visited ICU nurses and interactive    Chief Complaint: none  Patient comments/goals: edwin rios  Pain/Comfort:  · Pain Rating 1: 0/10    Patients cultural, spiritual, Denominational conflicts given the current situation: none reported     Objective:     Patient found with: peripheral IV, telemetry, oxygen     General Precautions: Standard, fall,  aspiration   Orthopedic Precautions:N/A   Braces: N/A     Functional Mobility:  · Bed Mobility:     · Rolling Right: minimum assistance  · Scooting: maximal assistance  · Supine to Sit: maximal assistance  · Transfers:     · Sit to Stand:  maximal assistance and of 2 persons with hand-held assist  · Bed to Chair: maximal assistance and of 2 persons with  hand-held assist  using  Stand Pivot  · Balance: fair sitting and poor standing      AM-PAC 6 CLICK MOBILITY  Turning over in bed (including adjusting bedclothes, sheets and blankets)?: 2  Sitting down on and standing up from a chair with arms (e.g., wheelchair, bedside commode, etc.): 2  Moving from lying on back to sitting on the side of the bed?: 2  Moving to and from a bed to a chair (including a wheelchair)?: 2  Need to walk in hospital room?: 1  Climbing 3-5 steps with a railing?: 1  Basic Mobility Total Score: 10       Therapeutic Activities and Exercises:   EOB sitting with verbal cueing for truncal and head positioning- thera ex to LE's attempting to move RLE and participating with ex  Bed to wheelchair transfers assist x2  Pt tolerated being wheeled out to hallways - pt able to hold head and trunk up and attempting to engage in conversations- SAT on RA 96%      Patient left up in chair with nurse Gabriela notified and daughter present..    GOALS:    Physical Therapy Goals        Problem: Physical Therapy Goal    Goal Priority Disciplines Outcome Goal Variances Interventions   Physical Therapy Goal     PT/OT, PT Ongoing (interventions implemented as appropriate)     Description:  Goals to be met by: 2018     Patient will increase functional independence with mobility by performin. Supine to sit with Maximum Assistance  2. Rolling to Left and Right with Moderate Assistance.  3. Sitting at edge of bed x15 minutes with Maximum Assistance  4. Increased functional strength to 2/5 for RLE    .  Goals to be met by: 08/10/2018     Patient will increase  functional independence with mobility by performin. Pt will be able to tolerate Supine.  2. Pt will be able to tolerate Sit to supine  3. Pt will be able to tolerate Sit to stand transfer   4. Pt will be able to tolerate Bed to chair transfer with most appropriate device.  5. Pt will be able to tolerate Gait  Activity using most appropriate device.     Goal edited 2018: Goals to be met 2018.    1. Pt will be able to tolerate supine<>sit mod A.  2. Pt will perform supine BLE exercises (actively/passively) x 10-20 as tolerated by patient.   3. Pt will tolerate sit<>stand, stand pivot transfers max A.                         Time Tracking:     PT Received On: 18  PT Start Time: 1002     PT Stop Time: 1028  PT Total Time (min): 26 min     Billable Minutes: Therapeutic Activity 25    Treatment Type: Treatment  PT/PTA: PT     PTA Visit Number: 2     Mariel Garza, PT  2018

## 2018-07-30 NOTE — CARE UPDATE
Reviewed case for Rehab with Dr Rock and Dr Conrad.  I spoke with patient's daughter and reviewed levels of care, including Rehab, SNF and home with home health.  Daughter works full time and patient will not have any full time support at discharge.  Rehab physicians recommend SNF level of care at this time due to lack of community support.

## 2018-07-30 NOTE — PLAN OF CARE
Problem: Patient Care Overview  Goal: Plan of Care Review  Medications reviewed and given  IV antibiotics  SR up x 2  Call light within reach  Bed low/wheels locked  Safety maintained

## 2018-07-30 NOTE — PLAN OF CARE
Note in Epic that [facility], Dr. Rock had declined admission and recommended SNF.  Frieda with [facility], [phone] stated they can admit pt today.  She stated pt was okay with the admission, daughter was a little hesitant.  Jo with [facility]hab [phone] stated they were still checking pt's financials and would get back with me.      07/30/18 1049   Discharge Reassessment   Assessment Type Discharge Planning Reassessment   Discharge Plan A Rehab

## 2018-07-30 NOTE — PT/OT/SLP PROGRESS
"Speech Language Pathology Treatment    Patient Name:  Kel Lock   MRN:  [id number]  Admitting Diagnosis: Cerebrovascular accident (CVA) due to bilateral thrombosis of carotid arteries    Recommendations:                 General Recommendations:  Dysphagia therapy and Cognitive-linguistic therapy  Diet recommendations:  Dental Soft, Chopped meat, Liquid Diet Level: Thin (single straw sips)   Aspiration Precautions: bed in chair position or OOB for meals, 1 bite/sip at a time, Assistance with meals, Check for pocketing/oral residue, Feed only when awake/alert, HOB to 90 degrees, Meds whole buried in puree, Small bites/sips and Strict aspiration precautions   General Precautions: Standard, aphasia, fall, aspiration, vision impaired  Communication strategies:  provide increased time to answer    Subjective     "I'm doing better"  Patient goals: none stated     Pain/Comfort:  · Pain Rating 1: 0/10    Objective:     Has the patient been evaluated by SLP for swallowing?   Yes  Keep patient NPO? No   Current Respiratory Status: room air      Pt completed automatic speech tasks including HATTIE, counting and phrase completions using opposites at 100% acc ind'ly. He named common objects present at bedside with 90% acc ind'ly increasing to 100% acc w/ phonemic cueing. Convergent naming completed with 35% acc ind'ly with Pt benefiting from further semantic description, gesture and phonemic cueing to enhance accuracy.     Assessment:     eKl Lock is a 67 y.o. male with an SLP diagnosis of Cognitive-Linguistic Impairment.      Goals:    SLP Goals        Problem: SLP Goal    Goal Priority Disciplines Outcome   SLP Goal     SLP Ongoing (interventions implemented as appropriate)   Description:  1. Ongoing BSS - MET  2. Complete cognitive-linguistic evaluation - MET  3. Orientation x 4 SBA, 90% accuracy/  4. Automatics 100% independently.   5. Follow 3-step commands, 90% accuracy, independently.   6. Demonstrate comprehension of " 3-sentence paragraph, 75% accuracy, independently.    7. NEW: Pt will participate in MBSS for further evaluation of swallow function--MET  8. Laryngeal elevation exercises with MIN A  9. Tolerate soft solids with functional/timely oral phase provided SPV                    Plan:     · Patient to be seen:  5 x/week   · Plan of Care expires:  08/02/18  · Plan of Care reviewed with:  patient   · SLP Follow-Up:  Yes       Discharge recommendations:  rehabilitation facility     Time Tracking:     SLP Treatment Date:   07/30/18  Speech Start Time:  1245  Speech Stop Time:  1300     Speech Total Time (min):  15 min    Billable Minutes: Speech Therapy Individual 15 and Total Time 15    Freedom Ramirez CCC-SLP  07/30/2018

## 2018-07-30 NOTE — PLAN OF CARE
Met with pt and his daughter Darshana in pt's room.  Updated them on the placement.  Daughter stated they would prefer [facility] [place] as it was closer to the family to visit but would be okay with [facility] if needed.  Obtained signature for the disclosure form.      12:15 p.m. - Jo at [facility], [phone] stated they will be able to admit pt and verified they could provide transportation.  She stated she would be at the hospital shortly to see pt.  Updated pt's daughter Darshana [phone].  Updated pt's nurse Gabriela and Dr. Garcia.     12:30 p.m. -updated Frieda at [facility] [phone] that family chose a rehab closer to their home.       07/30/18 1130   Discharge Reassessment   Assessment Type Discharge Planning Reassessment   Discharge Plan A Rehab

## 2018-07-30 NOTE — PLAN OF CARE
Spoke to Jo at [facility], [phone] who requested updated PT, OT, ST notes and radiology.  She stated okay to call report to [facility] at [phone] and she would call back with time when they can  pt.      2:40 p.m .- faxed [facility] through [facility] [employer], pt's AVS, discharge order, radiology, PT/OT/ST notes.  Updated pt's nurse Gabriela.      3:20 p.m. - updated Jo at [facility] that pt' s nurse attempted to call reort and was not able to.  She requested we try back again in 15 min.  Plan for her to call back with transportation time. Updated pt's nurse Gabriela.    4:00 p.m - Jo at [facility] still did not have transportation time available.  Faxed her through [facility], pt's labs.        07/30/18 1430   Discharge Reassessment   Assessment Type Discharge Planning Reassessment   Discharge Plan A Rehab

## 2018-07-30 NOTE — PLAN OF CARE
07/30/18 0743   Patient Assessment/Suction   Level of Consciousness (AVPU) alert   Respiratory Effort Normal;Unlabored   Expansion/Accessory Muscles/Retractions no use of accessory muscles;no retractions;expansion symmetric   All Lung Fields Breath Sounds diminished;clear   Rhythm/Pattern, Respiratory depth regular;pattern regular;unlabored   Cough Frequency infrequent   Cough Type good;nonproductive   PRE-TX-O2-ETCO2   O2 Device (Oxygen Therapy) nasal cannula   $ Is the patient on Low Flow Oxygen? Yes   Flow (L/min) 3   SpO2 (!) 94 %   Pulse Oximetry Type Intermittent   $ Pulse Oximetry - Multiple Charge Pulse Oximetry - Multiple   Pulse 61   Resp 18   Post-Treatment   Post-treatment Heart Rate (beats/min) 65   Post-treatment Resp Rate (breaths/min) 18   All Fields Breath Sounds unchanged   IPPB   $ IPPB Charge IPPB given   Respiratory Treatment Status given   IPPB Route mouthpiece   IPPB Pressure (cm H2O) 10   IPPB Patient Tolerance good   Preset CPAP/BiPAP Settings   Mode Of Delivery Standby     IPPB therapy q6 hours. Patient tolerated well. Bipap on standby.

## 2018-07-30 NOTE — PLAN OF CARE
Problem: Patient Care Overview  Goal: Plan of Care Review  Outcome: Ongoing (interventions implemented as appropriate)  Pt resting quietly with no signs of distress. Pt free from falls during shift. Pt able to move right leg and now feels sensation in right hand. Will continue to monitor.

## 2018-07-30 NOTE — DISCHARGE INSTRUCTIONS
[facility] Transfer Orders        Admit to: [facility]    Diagnoses:   Active Hospital Problems    Diagnosis  POA    *Cerebrovascular accident (CVA) due to bilateral thrombosis of carotid arteries [I63.033]  Yes     Priority: 1 - High    Acute hypercapnic respiratory failure [J96.02]  No     Priority: 3     Dysphagia [R13.10]  Yes    Encephalopathy, metabolic [G93.41]  Yes    Malnutrition of moderate degree [E44.0]  No    Type 2 diabetes mellitus [E11.9]  Yes    Essential hypertension [I10]  Yes    HLD (hyperlipidemia) [E78.5]  Yes      Resolved Hospital Problems    Diagnosis Date Resolved POA    Hypernatremia [E87.0] 07/27/2018 No    Leucocytosis [D72.829] 07/17/2018 Yes    S/P carotid endarterectomy [Z98.890] 07/16/2018 Not Applicable     Allergies: Review of patient's allergies indicates:  No Known Allergies    Code Status: DNR    Vitals: Routine       Diet: cardiac diet, diabetic diet: 2000 calorie, soft diet and fluid consistency thin    Activity: Activity as tolerated    Nursing Precautions: Aspiration , Fall and Pressure ulcer prevention    Bed/Surface: Pressure Redistribution    Consults: PT to evaluate and treat- 5 times a week, OT to evaluate and treat- 5 times a week and ST to evaluate and treat- 5 times a week    Oxygen: room air    Dialysis: Patient is not on dialysis.     Labs:  none   Pending Diagnostic Studies:     None        Imaging: None    Miscellaneous Care:   Routine Skin for Bedridden Patients:  Apply moisture barrier cream to all  Diabetes Care: Diabetes: Check blood sugar. Fingerstick blood sugar AC and HS  Sliding Scale/Hypoglycemia Protocol: For FSG<80, give 1 amp D50 or 1 glucose tablet. For FSG , do nothing. For -200, give 1 unit of novolog in addition to pre-meal insulin. For -250, give 2 units of novolog in addition to pre-meal insulin. For -300, give 3 units of novolog in addition to pre-meal insulin. For 301-350, give  4 units of novolog in addition to pre-meal insulin. For 351-400, give 5 units of novolog in addition to pre-meal insulin. For FSG >400, give 5 units of novolog in addition to pre-meal insulin and please call MD    IV Access: none     Medications: Discontinue all previous medication orders, if any. See new list below.  Current Discharge Medication List      START taking these medications    Details   clopidogrel (PLAVIX) 75 mg tablet Take 1 tablet (75 mg total) by mouth once daily.  Qty: 30 tablet, Refills: 11      enoxaparin (LOVENOX) 40 mg/0.4 mL Syrg Inject 0.4 mLs (40 mg total) into the skin once daily.      !! insulin aspart U-100 (NOVOLOG) 100 unit/mL InPn pen Inject 4 Units into the skin 3 (three) times daily.  Refills: 0      !! insulin aspart U-100 (NOVOLOG) 100 unit/mL InPn pen Inject 0-5 Units into the skin as needed (Hyperglycemia).  Refills: 0      insulin detemir U-100 (LEVEMIR FLEXTOUCH) 100 unit/mL (3 mL) SubQ InPn pen Inject 15 Units into the skin every evening.  Refills: 0       !! - Potential duplicate medications found. Please discuss with provider.      CONTINUE these medications which have CHANGED    Details   metoprolol succinate (TOPROL-XL) 25 MG 24 hr tablet Take 1 tablet (25 mg total) by mouth once daily.  Qty: 30 tablet, Refills: 11         CONTINUE these medications which have NOT CHANGED    Details   aspirin (ECOTRIN) 81 MG EC tablet Take 81 mg by mouth once daily.      atorvastatin (LIPITOR) 20 MG tablet Take 10 mg by mouth once daily.       busPIRone (BUSPAR) 5 MG Tab Take 5 mg by mouth once daily.       diphenhydramine HCl (UNISOM, DIPHENHYDRAMINE, ORAL) Take 1 tablet by mouth nightly as needed.       lisinopril 10 MG tablet Take 10 mg by mouth once daily.      melatonin 5 mg Tab Take by mouth nightly as needed.       paroxetine (PAXIL) 40 MG tablet Take 40 mg by mouth every morning.      traZODone (DESYREL) 50 MG tablet Take 150 mg by mouth every evening.          STOP taking these  medications       glipiZIDE (GLUCOTROL) 5 MG TR24 Comments:   Reason for Stopping:         hydrOXYzine pamoate (VISTARIL) 50 MG Cap Comments:   Reason for Stopping:             Follow up:   Follow-up Information     [facility] In 2 weeks.    Contact information:  [address] [place] [address] [place] [address]  [phone]             Jean Olivier DO In 2 weeks.    Specialty:  Neurology  Contact information:  [address]  [place] [address] [phone]

## 2018-07-31 ENCOUNTER — TELEPHONE (OUTPATIENT)
Dept: MEDSURG UNIT | Facility: HOSPITAL | Age: 68
End: 2018-07-31

## 2018-07-31 NOTE — DISCHARGE SUMMARY
[facility]  Discharge Summary      Patient Name: Kel Lock  MRN: [id number]  Admission Date: 7/15/2018  Hospital Length of Stay: 15 days  Discharge Date and Time: 7/30/2018  5:53 PM  Attending Physician: No att. providers found   Discharging Provider: Carrington Garcia MD  Primary Care Provider: [facility]      HPI:   Mr. Lock is a 67 YOCM with PMH of HTN, DM, HLD, who was last seen by his ex-wife 3 days ago, normal , and apparently was found lying on floor unable to move himself back to the bed after he had a fall, heard by his roommate. The above statement was obtained from the medical record. Family at bed sided include his daughter, son in-law, ex wife, who have not witness any change in his clinical status.   He was presented to the [facility] ER with right sided weakness, slurred speech and altered mental status. Telestroke was consulted who calculated his NIH value at 16. Initial CT head showed areas of moderate involutional changes consistent with microvascular ischemic changes with encephalomalacia watershed area of distribution, and subacute infarcts at right and left parieto-occipital cortex.   Pt was communicated with neurology, apparently who recommended MRI of the brain without contrast.   At the time of my interview with the patient, he denied CP, SOB or HA, problem with vision, but he clearly has left preference and right hemineglect. The pt is asking for water since he has touched the floor.       * No surgery found *      Hospital Course:   Patient is a 67-year-old  male with a past medical history significant for type 2 diabetes, coronary artery disease, hypertension who presents the hospital with an acute bilateral CVA.  Patient had severe CVA with NIHSS score initially 18.  He was started on appropriate secondary prevention measures including aspirin, Plavix, statin, and blood pressure control with beta-blocker with allowance of  permissive hypertension.  Patient subsequently developed aspiration pneumonia and was started on IV antibiotics.  His O2 sats were low and he was transferred to ICU and placed on BiPAP secondary to profound lethargy likely secondary to his stroke as well as hypercarbic respiratory failure.  Over the course of the next 3 days, the patient slowly improved and his lethargy resolved.  He was moved out of intensive care and unfortunately lacked the ability to swallow.  Speech language pathology evaluated the patient and he was initially set up for percutaneous gastrostomy placement, however he spontaneously began to improve dramatically.  He was re-evaluated by speech language pathology and was started on oral feeding (patient had been fed enterally through nasogastric tube prior) in his diet was advance to soft diet with thin liquids at time of discharge.     He had started to regain strength in his right lower extremity and his speech continued to improve throughout his hospitalization.  Blood pressure initially remain labile likely secondary to autonomic dysregulation but improved over the course of his hospitalization as well.  Patient was seen by vascular Neurology and had neuro imaging done which shows diffuse carotid disease both in the internal carotid intracranially as well as in the neck.  No further procedures were recommended by vascular Neurology.  Given the patient's rapid recovery as well as the course of his rehab, it was felt that he would be a good candidate for inpatient rehab.  He was accepted by [facility] and the patient will be transferred there.  Patient was seen and examined on the day of discharge and deemed medically stable for discharge. He completed full treatment of antibiotics for his aspiration pneumonia require no further antibiotics post discharge. He will need follow-up with primary care as well as vascular Neurology at discharge.     Consults:   Consults         Status Ordering Provider      Inpatient consult to Gastroenterology  Once     Provider:  Radha Rothman MD    Completed DIAZ DUGAN     Inpatient consult to Registered Dietitian/Nutritionist  Once     Provider:  (Not yet assigned)    Completed BRENDA SINGH     Inpatient consult to Registered Dietitian/Nutritionist  Once     Provider:  (Not yet assigned)    Completed BRENDA SINGH     Inpatient consult to Registered Dietitian/Nutritionist  Once     Provider:  (Not yet assigned)    Completed QING FOREMAN     Inpatient consult to Social Work  Once     Provider:  (Not yet assigned)    Completed QING FOREMAN     Inpatient consult to Social Work/Case Management  Once     Provider:  (Not yet assigned)    Completed BRENDA SINGH     IP consult to case management/social work  Once     Provider:  (Not yet assigned)    Completed BRENDA SINGH     IP consult to case management/social work  Once     Provider:  (Not yet assigned)    Completed BRENDA SINGH          No new Assessment & Plan notes have been filed under this hospital service since the last note was generated.  Service: Hospital Medicine    Final Active Diagnoses:    Diagnosis Date Noted POA    PRINCIPAL PROBLEM:  Cerebrovascular accident (CVA) due to bilateral thrombosis of carotid arteries [I63.033] 07/15/2018 Yes    Acute hypercapnic respiratory failure [J96.02] 07/18/2018 No    Dysphagia [R13.10] 07/25/2018 Yes    Encephalopathy, metabolic [G93.41] 07/18/2018 Yes    Malnutrition of moderate degree [E44.0] 07/17/2018 No    Type 2 diabetes mellitus [E11.9] 07/15/2018 Yes    Essential hypertension [I10] 07/15/2018 Yes    HLD (hyperlipidemia) [E78.5] 07/15/2018 Yes      Problems Resolved During this Admission:    Diagnosis Date Noted Date Resolved POA    Hypernatremia [E87.0] 07/19/2018 07/27/2018 No    Leucocytosis [D72.829] 07/15/2018 07/17/2018 Yes    S/P carotid endarterectomy [Z98.890] 07/15/2018 07/16/2018 Not Applicable       Discharged Condition:  stable    Disposition: Rehab Facility    Follow Up:  Follow-up Information     [facility] In 2 weeks.    Contact information:  [address] [place] [id number] [phone]             Jean Olivier DO In 2 weeks.    Specialty:  Neurology  Contact information:  [address] [place] [address]  [phone]             [facility].    Why:  Rehab  Contact information:  [address]  [facility] [address] [phone]               Patient Instructions:     Diet diabetic     Diet Cardiac     Notify your health care provider if you experience any of the following:  temperature >100.4     Notify your health care provider if you experience any of the following:  persistent nausea and vomiting or diarrhea     Notify your health care provider if you experience any of the following:  severe uncontrolled pain     Notify your health care provider if you experience any of the following:  difficulty breathing or increased cough     Notify your health care provider if you experience any of the following:  increased confusion or weakness     Activity as tolerated         Significant Diagnostic Studies:     Pending Diagnostic Studies:     None         Medications:  Reconciled Home Medications:      Medication List      START taking these medications    clopidogrel 75 mg tablet  Commonly known as:  PLAVIX  Take 1 tablet (75 mg total) by mouth once daily.     enoxaparin 40 mg/0.4 mL Syrg  Commonly known as:  LOVENOX  Inject 0.4 mLs (40 mg total) into the skin once daily.     * insulin aspart U-100 100 unit/mL Inpn pen  Commonly known as:  NovoLOG  Inject 4 Units into the skin 3 (three) times daily.     * insulin aspart U-100 100 unit/mL Inpn pen  Commonly known as:  NovoLOG  Inject 0-5 Units into the skin as needed (Hyperglycemia).     insulin detemir U-100 100 unit/mL (3 mL) Inpn pen  Commonly known as:  LEVEMIR FLEXTOUCH  Inject 15  Units into the skin every evening.        * This list has 2 medication(s) that are the same as other medications prescribed for you. Read the directions carefully, and ask your doctor or other care provider to review them with you.            CHANGE how you take these medications    metoprolol succinate 25 MG 24 hr tablet  Commonly known as:  TOPROL-XL  Take 1 tablet (25 mg total) by mouth once daily.  Start taking on:  7/31/2018  What changed:  medication strength        CONTINUE taking these medications    aspirin 81 MG EC tablet  Commonly known as:  ECOTRIN  Take 81 mg by mouth once daily.     atorvastatin 20 MG tablet  Commonly known as:  LIPITOR  Take 10 mg by mouth once daily.     busPIRone 5 MG Tab  Commonly known as:  BUSPAR  Take 5 mg by mouth once daily.     lisinopril 10 MG tablet  Take 10 mg by mouth once daily.     melatonin 5 mg Tab  Take by mouth nightly as needed.     paroxetine 40 MG tablet  Commonly known as:  PAXIL  Take 40 mg by mouth every morning.     traZODone 50 MG tablet  Commonly known as:  DESYREL  Take 150 mg by mouth every evening.     UNISOM (DIPHENHYDRAMINE) ORAL  Take 1 tablet by mouth nightly as needed.        STOP taking these medications    glipiZIDE 5 MG Tr24  Commonly known as:  GLUCOTROL     hydrOXYzine pamoate 50 MG Cap  Commonly known as:  VISTARIL            Indwelling Lines/Drains at time of discharge:   Lines/Drains/Airways          No matching active lines, drains, or airways          Time spent on the discharge of patient: 37 minutes  Patient was seen and examined on the date of discharge and determined to be suitable for discharge.         Carrington Garcia MD  Department of Hospital Medicine  [facility]

## 2018-08-01 NOTE — PLAN OF CARE
8-1-18 @ 10:35 a.mAna Osorio at [facility] requesting pt's CT and MRI results.  Faxed her the results through [facility].      08/01/18 1039   Discharge Reassessment   Assessment Type Discharge Planning Reassessment   Discharge Plan A Rehab

## 2020-06-03 ENCOUNTER — OFFICE VISIT (OUTPATIENT)
Dept: PULMONOLOGY | Facility: CLINIC | Age: 70
End: 2020-06-03
Payer: MEDICARE

## 2020-06-03 VITALS
HEART RATE: 92 BPM | SYSTOLIC BLOOD PRESSURE: 112 MMHG | OXYGEN SATURATION: 90 % | BODY MASS INDEX: 29.67 KG/M2 | TEMPERATURE: 98 F | WEIGHT: 195.13 LBS | DIASTOLIC BLOOD PRESSURE: 72 MMHG

## 2020-06-03 DIAGNOSIS — J44.9 CHRONIC OBSTRUCTIVE PULMONARY DISEASE, UNSPECIFIED COPD TYPE: Primary | ICD-10-CM

## 2020-06-03 DIAGNOSIS — R06.02 SHORTNESS OF BREATH: ICD-10-CM

## 2020-06-03 DIAGNOSIS — Z01.812 PRE-PROCEDURE LAB EXAM: ICD-10-CM

## 2020-06-03 DIAGNOSIS — G47.33 OBSTRUCTIVE SLEEP APNEA: ICD-10-CM

## 2020-06-03 PROCEDURE — 99999 PR PBB SHADOW E&M-EST. PATIENT-LVL IV: ICD-10-PCS | Mod: PBBFAC,,, | Performed by: NURSE PRACTITIONER

## 2020-06-03 PROCEDURE — 99999 PR PBB SHADOW E&M-EST. PATIENT-LVL IV: CPT | Mod: PBBFAC,,, | Performed by: NURSE PRACTITIONER

## 2020-06-03 PROCEDURE — 99205 PR OFFICE/OUTPT VISIT, NEW, LEVL V, 60-74 MIN: ICD-10-PCS | Mod: 25,S$PBB,, | Performed by: NURSE PRACTITIONER

## 2020-06-03 PROCEDURE — 99214 OFFICE O/P EST MOD 30 MIN: CPT | Mod: PBBFAC,PO | Performed by: NURSE PRACTITIONER

## 2020-06-03 PROCEDURE — 99205 OFFICE O/P NEW HI 60 MIN: CPT | Mod: 25,S$PBB,, | Performed by: NURSE PRACTITIONER

## 2020-06-03 RX ORDER — DULOXETIN HYDROCHLORIDE 60 MG/1
CAPSULE, DELAYED RELEASE ORAL
COMMUNITY
Start: 2020-05-18

## 2020-06-03 RX ORDER — HUMAN INSULIN 100 [IU]/ML
INJECTION, SOLUTION SUBCUTANEOUS
COMMUNITY
Start: 2020-04-17

## 2020-06-03 RX ORDER — ATORVASTATIN CALCIUM 10 MG/1
10 TABLET, FILM COATED ORAL
COMMUNITY

## 2020-06-03 RX ORDER — DIVALPROEX SODIUM 500 MG/1
TABLET, FILM COATED, EXTENDED RELEASE ORAL
COMMUNITY
Start: 2020-06-01

## 2020-06-03 RX ORDER — ATORVASTATIN CALCIUM 10 MG/1
TABLET, FILM COATED ORAL
COMMUNITY
Start: 2020-05-25

## 2020-06-03 RX ORDER — INSULIN DETEMIR 100 [IU]/ML
INJECTION, SOLUTION SUBCUTANEOUS
COMMUNITY
Start: 2020-05-07

## 2020-06-03 NOTE — LETTER
Jahaira 3, 2020      Miladys Brown MD  [address] [place] [address] [place] [address]           [facility]  [address]  [place] [address]  Phone: [phone]  Fax: [phone]          Patient: Kel Lock   MR Number: [id number]   YOB: 1950   Date of Visit: 6/3/2020       Dear Dr. Miladys Brown:    Thank you for referring Kel Lock to me for evaluation. Attached you will find relevant portions of my assessment and plan of care.    If you have questions, please do not hesitate to call me. I look forward to following Kel Lock along with you.    Sincerely,    Lizett Fair, NP    Enclosure  CC:  No Recipients    If you would like to receive this communication electronically, please contact [email or web address] or [phone] to request more information on [employer] access.    For providers and/or their staff who would like to refer a patient to [facility], please contact us through our one-stop-shop provider referral line, [facility], at [phone].    If you feel you have received this communication in error or would no longer like to receive these types of communications, please e-mail [email or web address]

## 2020-06-03 NOTE — PROGRESS NOTES
6/3/2020    Kel Lock  New Patient Consult    Chief Complaint   Patient presents with    Apnea    Shortness of Breath    COPD       HPI:  6/3/2020-  In clinic with nursing home with LPN, complaint of SOB, worse when rolling in wheelchair with feet. LPN states she has noticed blue lips and fingers that resolves with supplemental oxygen. Has insomnia and does not sleep at night well, complaint of sob, wheeze, chest tightness onset 2 years. Worse with exertion, improves with oxygen and rest  Uses nebulizer only as needed 1x every 2 weeks.   Cough- onset 2 years, worse in am, worse in last two months, productive quarter size green mucous.   Social Hx:lives in [facility] home. 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   Asbestosis exposure, Smoking Hx: currently smokes half pack daily, 40 pack history  Family Hx: no Lung Cancer, mother and father COPD, cousin Asthma  Medical Hx: Asthma dx as child currently tx with albuterol nebulizer; supplemental oxygen as needed for years set at 2L. Wears nightly when sleeping and during day as needed.  previous pneumonia 2018 tx outpatient; stroke 2018 previous left shoulder surgery in last two years/ no chest surgery          The chief compliant  problem is new to me  PFSH:  Past Medical History:   Diagnosis Date    COPD (chronic obstructive pulmonary disease)     Depression     Diabetes mellitus     Hypertension     Stroke          Past Surgical History:   Procedure Laterality Date    carpal tunel surgery Bilateral     coronary artery endartarectomy      FRACTURE SURGERY      SHOULDER SURGERY Right      Social History     Tobacco Use    Smoking status: Current Every Day Smoker     Packs/day: 2.00    Smokeless tobacco: Never Used   Substance Use Topics    Alcohol use: No    Drug use: No     Comment: Unable to assess     Family History   Problem Relation Age of Onset    Heart disease Mother     Heart disease Father     Early  death Father     Stroke Brother      Review of patient's allergies indicates:  No Known Allergies  I have reviewed past medical, family, and social history. I have reviewed previous nurse notes.    Performance Status:The patient's activity level is mobility with asit devices: wheelchair        Review of Systems   Constitutional: Negative for activity change, appetite change, chills, diaphoresis, fever and unexpected weight change. Positive for fatigue  HENT: Negative for dental problem, postnasal drip, rhinorrhea, sinus pressure, sinus pain, sneezing, sore throat, trouble swallowing and voice change.    Respiratory: Negative for  and stridor.  Positive for apnea, cough, chest tightness, shortness of breath, wheezing  Cardiovascular: Negative for chest pain,. Positive for  palpitations and leg swelling  Gastrointestinal: Negative for abdominal pain, constipation and nausea. Positive for abdominal distention.  Musculoskeletal: Negative for myalgias and neck pain. Positive for gait problem,   Skin: Positive for color change and pallor.   Allergic/Immunologic: Negative for environmental allergies and food allergies.   Neurological: Negative for dizziness, speech difficulty,  numbness and headaches. Positive for weakness, light-headedness, numbness in feet  Hematological: Negative for adenopathy. Does not bruise/bleed easily.   Psychiatric/Behavioral: The patient is not nervous/anxious.  Positive for sleep disturbance insomnia and depression         Exam:Comprehensive exam done. /72 (BP Location: Left arm, Patient Position: Sitting)   Pulse 92   Temp 98.2 °F (36.8 °C)   Wt 88.5 kg (195 lb 1.7 oz)   SpO2 (!) 90% Comment: on room air at rest  BMI 29.67 kg/m²   Exam included Vitals as listed  Constitutional: He is oriented to person, place, and time. He appears well-developed. No distress. Some mental confusion, difficultly communicating  Nose: Nose normal.   Mouth/Throat: Uvula is midline, oropharynx is clear  and moist and mucous membranes are normal. No dental caries. No oropharyngeal exudate, posterior oropharyngeal edema, posterior oropharyngeal erythema or tonsillar abscesses.  Mallapatti (M) score 3  Eyes: Pupils are equal, round, and reactive to light.    Neck: No JVD present. No thyromegaly present.   Cardiovascular: Normal rate, regular rhythm and normal heart sounds. Exam reveals no gallop and no friction rub.   No murmur heard.  Pulmonary/Chest: Effort normal and breath sounds normal. No accessory muscle usage or stridor. No apnea and no tachypnea. No respiratory distress, decreased breath sounds, wheezes, rhonchi, rales, or tenderness.   Abdominal: Soft. He exhibits no mass. There is no tenderness. No hepatosplenomegaly, hernias and normoactive bowel sounds  Musculoskeletal: exhibits no edema. Limited range of motion, bilateral lower extremity edema pitting +3   Lymphadenopathy:     He has no cervical adenopathy.     He has no axillary adenopathy.   Skin: Skin is warm and dry. Capillary refill takes less 2 sec. No cyanosis or erythema.  pallor. Nails show clubbing, palor complexion   Psychiatric:  behavior is normal. Judgment and thought content normal.       Radiographs (ct chest and cxr) reviewed: view by direct vision   X-Ray Chest AP [id number]/22/2018   There is prominence of the aortic arch along with heavy calcification of the aorta.  Mild patchy infiltrates are now seen at the lung bases.         Labs reviewed       Lab Results   Component Value Date    WBC 10.10 07/30/2018    RBC 3.86 (L) 07/30/2018    HGB 11.4 (L) 07/30/2018    HCT 35.1 (L) 07/30/2018    MCV 91 07/30/2018    MCH 29.5 07/30/2018    MCHC 32.4 07/30/2018    RDW 13.7 07/30/2018     07/30/2018    MPV 9.7 07/30/2018    GRAN 7.6 07/30/2018    GRAN 75.5 (H) 07/30/2018    LYMPH 1.5 07/30/2018    LYMPH 15.3 (L) 07/30/2018    MONO 0.6 07/30/2018    MONO 5.6 07/30/2018    EOS 0.3 07/30/2018    BASO 0.10 07/30/2018    EOSINOPHIL 3.1  07/30/2018    BASOPHIL 0.5 07/30/2018       PFT will be done and results to be reviewed  Pulmonary Functions Testing Results:    EPWORTH SLEEPINESS SCALE 17 ON 6/3/2020    Plan:  Clinical impression is resonably certain and repeated evaluation prn +/- follow up will be needed as below.    Kel was seen today for apnea, shortness of breath and copd.    Diagnoses and all orders for this visit:    Chronic obstructive pulmonary disease, unspecified COPD type  -     Complete PFT without bronchodilator; Future  -     CT Chest Without Contrast; Future  -     fluticasone-umeclidin-vilanter (TRELEGY ELLIPTA) 100-62.5-25 mcg DsDv; Inhale 1 puff into the lungs once daily.    Shortness of breath  -     CT Chest Without Contrast; Future  -     fluticasone-umeclidin-vilanter (TRELEGY ELLIPTA) 100-62.5-25 mcg DsDv; Inhale 1 puff into the lungs once daily.    Obstructive sleep apnea  -     Polysomnogram (CPAP will be added if patient meets diagnostic criteria.); Future        Follow up in about 4 weeks (around 7/1/2020), or if symptoms worsen or fail to improve.    Discussed with patient above for education the following:      Patient Instructions   Will order several test to evaluate the strength of your lungs.    These are necessary in order to prescribe a machine to help you sleep at night.     Sleep study at [facility]  CT of chest and lung strength test    Start new medication  Trelegy 100 1 puff once a day every day, rinse mouth after using due to risk for thrush if mouth or tongue has white sores contact clinic    If this is not available from your pharmacy call clinic and I will order something else, your lungs might be so weak you require twice a day breathing treatments with medications    Continue supplemental oxygen, wear at night every night until testing is done.

## 2020-06-03 NOTE — PATIENT INSTRUCTIONS
Will order several test to evaluate the strength of your lungs.    These are necessary in order to prescribe a machine to help you sleep at night.     [facility]  [facility] of chest and lung strength test    Start new medication  Trelegy 100 1 puff once a day every day, rinse mouth after using due to risk for thrush if mouth or tongue has white sores contact clinic    If this is not available from your pharmacy call clinic and I will order something else, your lungs might be so weak you require twice a day breathing treatments with medications    Continue supplemental oxygen, wear at night every night until testing is done.

## 2020-06-05 ENCOUNTER — HOSPITAL ENCOUNTER (OUTPATIENT)
Dept: RADIOLOGY | Facility: HOSPITAL | Age: 70
Discharge: HOME | End: 2020-06-05
Attending: NURSE PRACTITIONER
Payer: MEDICARE

## 2020-06-05 ENCOUNTER — HOSPITAL ENCOUNTER (OUTPATIENT)
Dept: PULMONOLOGY | Facility: HOSPITAL | Age: 70
Discharge: HOME | End: 2020-06-05
Attending: NURSE PRACTITIONER
Payer: MEDICARE

## 2020-06-05 DIAGNOSIS — R06.02 SHORTNESS OF BREATH: ICD-10-CM

## 2020-06-05 DIAGNOSIS — J44.9 CHRONIC OBSTRUCTIVE PULMONARY DISEASE, UNSPECIFIED COPD TYPE: ICD-10-CM

## 2020-06-05 PROCEDURE — 71250 CT THORAX DX C-: CPT | Mod: 26,,, | Performed by: RADIOLOGY

## 2020-06-05 PROCEDURE — 94060 EVALUATION OF WHEEZING: CPT

## 2020-06-05 PROCEDURE — 71250 CT CHEST WITHOUT CONTRAST: ICD-10-PCS | Mod: 26,,, | Performed by: RADIOLOGY

## 2020-06-05 PROCEDURE — 94727 GAS DIL/WSHOT DETER LNG VOL: CPT

## 2020-06-05 PROCEDURE — 94727 GAS DIL/WSHOT DETER LNG VOL: CPT | Mod: 26,,, | Performed by: INTERNAL MEDICINE

## 2020-06-05 PROCEDURE — 94729 DIFFUSING CAPACITY: CPT | Mod: 26,,, | Performed by: INTERNAL MEDICINE

## 2020-06-05 PROCEDURE — 94060 EVALUATION OF WHEEZING: CPT | Mod: 26,,, | Performed by: INTERNAL MEDICINE

## 2020-06-05 PROCEDURE — 71250 CT THORAX DX C-: CPT | Mod: TC

## 2020-06-05 PROCEDURE — 94727 PR PULM FUNCTION TEST BY GAS: ICD-10-PCS | Mod: 26,,, | Performed by: INTERNAL MEDICINE

## 2020-06-05 PROCEDURE — 94060 PR EVAL OF BRONCHOSPASM: ICD-10-PCS | Mod: 26,,, | Performed by: INTERNAL MEDICINE

## 2020-06-05 PROCEDURE — 94729 PR C02/MEMBANE DIFFUSE CAPACITY: ICD-10-PCS | Mod: 26,,, | Performed by: INTERNAL MEDICINE

## 2020-06-05 PROCEDURE — 94729 DIFFUSING CAPACITY: CPT

## 2020-06-08 ENCOUNTER — TELEPHONE (OUTPATIENT)
Dept: PULMONOLOGY | Facility: CLINIC | Age: 70
End: 2020-06-08

## 2020-06-08 LAB
BRPFT: ABNORMAL
DLCO ADJ PRE: 11.19 ML/(MIN*MMHG) (ref 18.47–32.32)
DLCO SINGLE BREATH LLN: 18.47
DLCO SINGLE BREATH PRE REF: 44.1 %
DLCO SINGLE BREATH REF: 25.39
DLCOC SBVA LLN: 2.55
DLCOC SBVA PRE REF: 89 %
DLCOC SBVA REF: 3.77
DLCOC SINGLE BREATH LLN: 18.47
DLCOC SINGLE BREATH PRE REF: 44.1 %
DLCOC SINGLE BREATH REF: 25.39
DLCOVA LLN: 2.55
DLCOVA PRE REF: 89 %
DLCOVA PRE: 3.35 ML/(MIN*MMHG*L) (ref 2.55–4.98)
DLCOVA REF: 3.77
DLVAADJ PRE: 3.35 ML/(MIN*MMHG*L) (ref 2.55–4.98)
ERVN2 LLN: -16449
ERVN2 PRE REF: 27.1 %
ERVN2 PRE: 0.27 L (ref -16449–16451)
ERVN2 REF: 1
FEF 25 75 CHG: -18.7 %
FEF 25 75 LLN: 0.96
FEF 25 75 POST REF: 63.5 %
FEF 25 75 PRE REF: 78.1 %
FEF 25 75 REF: 2.26
FET100 CHG: 14.2 %
FEV1 CHG: -1.4 %
FEV1 FVC CHG: -6.6 %
FEV1 FVC LLN: 62
FEV1 FVC POST REF: 90.9 %
FEV1 FVC PRE REF: 97.3 %
FEV1 FVC REF: 76
FEV1 LLN: 2.13
FEV1 POST REF: 66.4 %
FEV1 PRE REF: 67.4 %
FEV1 REF: 2.97
FRCN2 LLN: 2.61
FRCN2 PRE REF: 47.2 %
FRCN2 REF: 3.6
FVC CHG: 5.6 %
FVC LLN: 2.9
FVC POST REF: 72.7 %
FVC PRE REF: 68.9 %
FVC REF: 3.92
IVC PRE: 2.08 L (ref 2.9–4.95)
IVC SINGLE BREATH LLN: 2.9
IVC SINGLE BREATH PRE REF: 53.1 %
IVC SINGLE BREATH REF: 3.92
MVV LLN: 98
MVV PRE REF: 32.9 %
MVV REF: 116
PEF CHG: 4.4 %
PEF LLN: 5.65
PEF POST REF: 35.9 %
PEF PRE REF: 34.3 %
PEF REF: 7.84
POST FEF 25 75: 1.44 L/S (ref 0.96–3.56)
POST FET 100: 4.58 SEC
POST FEV1 FVC: 69.18 % (ref 62.4–89.75)
POST FEV1: 1.97 L (ref 2.13–3.81)
POST FVC: 2.85 L (ref 2.9–4.95)
POST PEF: 2.81 L/S (ref 5.65–10.03)
PRE DLCO: 11.19 ML/(MIN*MMHG) (ref 18.47–32.32)
PRE FEF 25 75: 1.77 L/S (ref 0.96–3.56)
PRE FET 100: 4.01 SEC
PRE FEV1 FVC: 74.06 % (ref 62.4–89.75)
PRE FEV1: 2 L (ref 2.13–3.81)
PRE FRC N2: 1.7 L
PRE FVC: 2.7 L (ref 2.9–4.95)
PRE MVV: 38 L/MIN (ref 98.26–132.94)
PRE PEF: 2.69 L/S (ref 5.65–10.03)
RVN2 LLN: 1.92
RVN2 PRE REF: 43 %
RVN2 PRE: 1.12 L (ref 1.92–3.27)
RVN2 REF: 2.6
RVN2TLCN2 LLN: 32.67
RVN2TLCN2 PRE REF: 70.2 %
RVN2TLCN2 PRE: 29.25 % (ref 32.67–50.63)
RVN2TLCN2 REF: 41.65
TLCN2 LLN: 5.59
TLCN2 PRE REF: 56.6 %
TLCN2 PRE: 3.82 L (ref 5.59–7.89)
TLCN2 REF: 6.74
VA PRE: 3.34 L (ref 6.59–6.59)
VA SINGLE BREATH LLN: 6.59
VA SINGLE BREATH PRE REF: 50.6 %
VA SINGLE BREATH REF: 6.59
VCMAXN2 LLN: 2.9
VCMAXN2 PRE REF: 68.9 %
VCMAXN2 PRE: 2.7 L (ref 2.9–4.95)
VCMAXN2 REF: 3.92

## 2020-06-08 NOTE — TELEPHONE ENCOUNTER
Spoke with patients daughter, Darshana, reviewed CT results. She verbally understood.     ----- Message from Lizett Fair NP sent at 6/5/2020  4:51 PM CDT -----  CT of chest shows COPD and a few small lung nodules that will be monitored.

## 2020-06-30 DIAGNOSIS — G47.33 OBSTRUCTIVE SLEEP APNEA SYNDROME: Primary | ICD-10-CM

## 2020-06-30 NOTE — PROGRESS NOTES
Request from nursing home NP, original order for polysomnogram but due to pt lives in a [facility], has to be manually lifted and is incontinent so inlab study is not an option. Requested home sleep study to be ordered. Order sent to at home company.

## 2022-12-29 NOTE — ED NOTES
Ambulance in route.  
Daughter at bedside.  
EMS at bedside, pt transferred out of facility in route to [id number].  
ERP Dr. Casas speaking on telephone with Dr. Laureano at [facility].  
Report given to Agueda BRICE at [facility].  
[facility] by Dr. Rutledge.  
Pt with bp 86/48
BP 84/44.
straight cath output

## 2023-02-23 NOTE — ASSESSMENT & PLAN NOTE
Uncontrolled, likely related to excessive intake of sugar snacks.  Will start patient on 4 units of NovoLog pre meal for basal/bolus and monitor sliding scale closely.  Continue long-acting insulin at same dose.    Lab Results   Component Value Date    HGBA1C 7.2 (H) 07/15/2018     Most recent fingerstick glucose reviewed-     Recent Labs  Lab 07/26/18  2139 07/27/18  1353 07/27/18  1834   POCTGLUCOSE 311* 314* 296*     Current correctional scale  Low  Maintain anti-hyperglycemic dose as follows-   Antihyperglycemics     Start     Stop Route Frequency Ordered    07/27/18 1130  insulin aspart U-100 pen 4 Units      -- SubQ 3 times daily with meals 07/27/18 0932    07/26/18 2100  insulin detemir U-100 pen 10 Units      -- SubQ Nightly 07/26/18 0928    07/17/18 1744  insulin aspart U-100 pen 0-5 Units      -- SubQ As needed (PRN) 07/17/18 1648           No

## 2024-03-12 NOTE — ASSESSMENT & PLAN NOTE
Patient is chronically on statin. Will continue for now. Monitor clinically. Last LDL was   Lab Results   Component Value Date    LDLCALC 63.8 07/15/2018           There are no Wet Read(s) to document.